# Patient Record
Sex: MALE | Employment: UNEMPLOYED | URBAN - METROPOLITAN AREA
[De-identification: names, ages, dates, MRNs, and addresses within clinical notes are randomized per-mention and may not be internally consistent; named-entity substitution may affect disease eponyms.]

---

## 2022-06-23 LAB
HCV AB SER-ACNC: NEGATIVE
HIV 1+2 AB+HIV1 P24 AG SERPL QL IA: NORMAL

## 2022-06-29 ENCOUNTER — OFFICE VISIT (OUTPATIENT)
Dept: PRIMARY CARE CLINIC | Facility: CLINIC | Age: 62
End: 2022-06-29
Payer: MEDICAID

## 2022-06-29 VITALS
HEIGHT: 63 IN | SYSTOLIC BLOOD PRESSURE: 122 MMHG | DIASTOLIC BLOOD PRESSURE: 72 MMHG | HEART RATE: 90 BPM | WEIGHT: 196.44 LBS | OXYGEN SATURATION: 95 % | BODY MASS INDEX: 34.8 KG/M2

## 2022-06-29 DIAGNOSIS — B35.3 TINEA PEDIS OF BOTH FEET: Primary | ICD-10-CM

## 2022-06-29 DIAGNOSIS — L72.0 EPIDERMAL INCLUSION CYST: ICD-10-CM

## 2022-06-29 DIAGNOSIS — R22.2 MASS OF RIGHT CHEST WALL: ICD-10-CM

## 2022-06-29 DIAGNOSIS — L30.9 DERMATITIS: ICD-10-CM

## 2022-06-29 PROCEDURE — 99214 OFFICE O/P EST MOD 30 MIN: CPT | Mod: S$PBB,,, | Performed by: FAMILY MEDICINE

## 2022-06-29 PROCEDURE — 1159F PR MEDICATION LIST DOCUMENTED IN MEDICAL RECORD: ICD-10-PCS | Mod: CPTII,,, | Performed by: FAMILY MEDICINE

## 2022-06-29 PROCEDURE — 1159F MED LIST DOCD IN RCRD: CPT | Mod: CPTII,,, | Performed by: FAMILY MEDICINE

## 2022-06-29 PROCEDURE — 3008F BODY MASS INDEX DOCD: CPT | Mod: CPTII,,, | Performed by: FAMILY MEDICINE

## 2022-06-29 PROCEDURE — 99999 PR PBB SHADOW E&M-EST. PATIENT-LVL V: CPT | Mod: PBBFAC,,, | Performed by: FAMILY MEDICINE

## 2022-06-29 PROCEDURE — 99215 OFFICE O/P EST HI 40 MIN: CPT | Mod: PBBFAC,PN | Performed by: FAMILY MEDICINE

## 2022-06-29 PROCEDURE — 99999 PR PBB SHADOW E&M-EST. PATIENT-LVL V: ICD-10-PCS | Mod: PBBFAC,,, | Performed by: FAMILY MEDICINE

## 2022-06-29 PROCEDURE — 3078F DIAST BP <80 MM HG: CPT | Mod: CPTII,,, | Performed by: FAMILY MEDICINE

## 2022-06-29 PROCEDURE — 3078F PR MOST RECENT DIASTOLIC BLOOD PRESSURE < 80 MM HG: ICD-10-PCS | Mod: CPTII,,, | Performed by: FAMILY MEDICINE

## 2022-06-29 PROCEDURE — 3008F PR BODY MASS INDEX (BMI) DOCUMENTED: ICD-10-PCS | Mod: CPTII,,, | Performed by: FAMILY MEDICINE

## 2022-06-29 PROCEDURE — 3074F SYST BP LT 130 MM HG: CPT | Mod: CPTII,,, | Performed by: FAMILY MEDICINE

## 2022-06-29 PROCEDURE — 99214 PR OFFICE/OUTPT VISIT, EST, LEVL IV, 30-39 MIN: ICD-10-PCS | Mod: S$PBB,,, | Performed by: FAMILY MEDICINE

## 2022-06-29 PROCEDURE — 3074F PR MOST RECENT SYSTOLIC BLOOD PRESSURE < 130 MM HG: ICD-10-PCS | Mod: CPTII,,, | Performed by: FAMILY MEDICINE

## 2022-06-29 RX ORDER — TERBINAFINE HYDROCHLORIDE 250 MG/1
250 TABLET ORAL DAILY
Qty: 14 TABLET | Refills: 0 | Status: SHIPPED | OUTPATIENT
Start: 2022-06-29 | End: 2022-07-13 | Stop reason: SDUPTHER

## 2022-06-29 RX ORDER — TRIAMCINOLONE ACETONIDE 5 MG/G
CREAM TOPICAL 2 TIMES DAILY
Qty: 15 G | Refills: 0 | Status: SHIPPED | OUTPATIENT
Start: 2022-06-29 | End: 2022-07-07

## 2022-06-29 RX ORDER — CLOTRIMAZOLE AND BETAMETHASONE DIPROPIONATE 10; .64 MG/G; MG/G
CREAM TOPICAL
COMMUNITY
Start: 2022-06-23 | End: 2023-06-19

## 2022-06-29 RX ORDER — PREDNISOLONE ACETATE 10 MG/ML
1 SUSPENSION/ DROPS OPHTHALMIC 3 TIMES DAILY
COMMUNITY
Start: 2022-05-24 | End: 2022-09-01

## 2022-06-29 RX ORDER — LEVOCETIRIZINE DIHYDROCHLORIDE 5 MG/1
5 TABLET, FILM COATED ORAL DAILY
COMMUNITY
Start: 2022-06-23 | End: 2022-09-01 | Stop reason: SINTOL

## 2022-06-29 NOTE — PROGRESS NOTES
Subjective:       Patient ID: Payal Landon is a 61 y.o. male.    Chief Complaint: Establish Care    62 yo M pt, new to me (no PCP), with PMH significant for obesity. He presents with several complaints:     1. He complains of pruritic rash to bilateral feet (left >right) x 2 years. Experiencing rash to interdigital spaces that have not improved with OTC anti-fungals. Also with rash to dorsum of bilateral feet. He was treated at [facility] in [place] on 06/23/2022 with clotrimazole 1%-betamethasone 0.05% cream with some improvement, however, rash still persists.  Has similar rash to flexor surfaces of distal wrists.    2. He c/o mass to left upper back x couple years. No redness, pruritis, drainage, or pain. No change in size.    3. He c/o mass to right posterolateral chest wall x couple years. No redness, pruritis, drainage, or pain. Has been enlarging in size.      Past Medical History:   Diagnosis Date    No known health problems        Patient Active Problem List   Diagnosis    Tinea pedis of both feet    Dermatitis       Past Surgical History:   Procedure Laterality Date    NO PAST SURGERIES         Family History   Problem Relation Age of Onset    Hypertension Father     Diabetes Mellitus Father     Heart disease Father     Cancer Neg Hx        Social History     Tobacco Use   Smoking Status Current Every Day Smoker    Types: Cigarettes   Smokeless Tobacco Never Used       Social History     Social History Narrative    From [place], came to [place]    Tobacco: Initiated at age 20 yo; 0.5 ppd at present     EtOH: None    Drug: None    Employment: ; will retire in 2023    Education: 8th grade     Lives with son, wife            Medications have been reviewed and reconciled.     Review of patient's allergies indicates:  No Known Allergies     Review of Systems   Constitutional: Negative for activity change, appetite change, chills, fever and unexpected weight  "change.   HENT: Negative for congestion and sore throat.    Eyes: Negative for redness, itching and visual disturbance.   Respiratory: Negative for cough, shortness of breath and wheezing.    Cardiovascular: Negative for chest pain.   Gastrointestinal: Negative for abdominal pain, constipation, diarrhea, nausea and vomiting.   Genitourinary: Negative for difficulty urinating, dysuria, frequency, penile discharge and urgency.   Musculoskeletal: Negative for back pain.   Skin: Positive for rash.   Neurological: Negative for dizziness, syncope, weakness and headaches.   Psychiatric/Behavioral: Negative for dysphoric mood and suicidal ideas. The patient is not nervous/anxious.          Objective:        /72 (BP Location: Left arm, Patient Position: Sitting, BP Method: Medium (Automatic))   Pulse 90   Ht 5' 3" (1.6 m)   Wt 89.1 kg (196 lb 6.9 oz)   SpO2 95%   BMI 34.80 kg/m²      Physical Exam  Constitutional:       General: He is not in acute distress.     Appearance: He is well-developed. He is obese.   HENT:      Head: Normocephalic and atraumatic.      Right Ear: External ear normal.      Left Ear: External ear normal.   Eyes:      General: No scleral icterus.     Extraocular Movements: Extraocular movements intact.      Conjunctiva/sclera: Conjunctivae normal.   Cardiovascular:      Rate and Rhythm: Normal rate and regular rhythm.      Heart sounds: Normal heart sounds. No murmur heard.    No friction rub. No gallop.   Pulmonary:      Effort: Pulmonary effort is normal. No respiratory distress.      Breath sounds: Normal breath sounds. No wheezing or rales.   Musculoskeletal:         General: No tenderness or deformity. Normal range of motion.      Cervical back: Normal range of motion.   Skin:     General: Skin is warm and dry.      Findings: No erythema or rash.      Comments: + scaling and maceration to 3rd and 4th interdigital spaces of bilateral feet.     Right lateral malleolus with patchy region " hyperpigmentation, lichenification, and overlying scale     Dorsolateral aspect of left foot with patchy regions of hyperpigmentation, lichenification, and overlying scale    Left superoposterior chest wall: Fingertip-sized subcutaneous rubbery mass with black punctum. Sebaceous material able to be expressed.     Right posterolateral chest wall: 4x3 cm mobile mass with dough-like consistency.   Neurological:      Mental Status: He is alert and oriented to person, place, and time.      Cranial Nerves: No cranial nerve deficit.      Motor: No abnormal muscle tone.      Gait: Gait normal.   Psychiatric:         Behavior: Behavior normal.           Assessment:       1. Tinea pedis of both feet    2. Dermatitis    3. Epidermal inclusion cyst    4. Mass of right chest wall        Plan:       Payal was seen today for establish care.    Diagnoses and all orders for this visit:    Tinea pedis of both feet  -     terbinafine HCL (LAMISIL) 250 mg tablet; Take 1 tablet (250 mg total) by mouth once daily. for 14 days    Dermatitis  -     triamcinolone acetonide 0.5% (KENALOG) 0.5 % Crea; Apply topically 2 (two) times daily. for 14 days    Epidermal inclusion cyst  -     Ambulatory referral/consult to Dermatology; Future    Mass of right chest wall  -     US Soft Tissue Chest_Upper Back; Future  -     Ambulatory referral/consult to General Surgery; Future    Bilateral tinea pedis   --No improvement with OTC anti-fungals   --No improvement with 1 week of rx'd clotrimazole 1%-betamethasone 0.05% given by [facility].  --Will tx with terbinafine 250 mg daily x 2 weeks    Dermatitis; bilateral feet  --Present x 2 years  --Suspect Atopic Dermatitis based on appearance; psoriasis also a possibility given location at extensor surfaces.  --Tx with triamcinolone 0.5% BID x 2 weeks   --Gentle skin regimen to be reviewed on follow-up    EIC; Left superoposterior chest wall  --Reassured benign etiology   --Refer to dermatology for  excision     Suspect lipoma right posterolateral chest wall  --Reassured benign etiology   --Refer to US for further characterization   --Refer to gen surg for removal        Requesting lab/vaccine records from    Had PSA there done as well    Follow up in about 2 weeks (around 7/13/2022) for f/u visit for dermatitis of feet..    I spent a total of 54 minutes on the day of the visit.This includes face to face time and non-face to face time preparing to see the patient (eg, review of tests), obtaining and/or reviewing separately obtained history, documenting clinical information in the electronic or other health record, independently interpreting results and communicating results to the patient/family/caregiver, or care coordinator.

## 2022-06-30 ENCOUNTER — OFFICE VISIT (OUTPATIENT)
Dept: SURGERY | Facility: CLINIC | Age: 62
End: 2022-06-30
Payer: MEDICAID

## 2022-06-30 ENCOUNTER — PATIENT MESSAGE (OUTPATIENT)
Dept: ADMINISTRATIVE | Facility: HOSPITAL | Age: 62
End: 2022-06-30
Payer: MEDICAID

## 2022-06-30 VITALS
SYSTOLIC BLOOD PRESSURE: 125 MMHG | WEIGHT: 194 LBS | HEIGHT: 63 IN | HEART RATE: 101 BPM | DIASTOLIC BLOOD PRESSURE: 85 MMHG | BODY MASS INDEX: 34.38 KG/M2

## 2022-06-30 DIAGNOSIS — R22.2 MASS OF RIGHT CHEST WALL: Primary | ICD-10-CM

## 2022-06-30 PROCEDURE — 3079F PR MOST RECENT DIASTOLIC BLOOD PRESSURE 80-89 MM HG: ICD-10-PCS | Mod: CPTII,,, | Performed by: SURGERY

## 2022-06-30 PROCEDURE — 99203 OFFICE O/P NEW LOW 30 MIN: CPT | Mod: S$PBB,,, | Performed by: SURGERY

## 2022-06-30 PROCEDURE — 1159F PR MEDICATION LIST DOCUMENTED IN MEDICAL RECORD: ICD-10-PCS | Mod: CPTII,,, | Performed by: SURGERY

## 2022-06-30 PROCEDURE — 99999 PR PBB SHADOW E&M-EST. PATIENT-LVL V: ICD-10-PCS | Mod: PBBFAC,,, | Performed by: SURGERY

## 2022-06-30 PROCEDURE — 3074F SYST BP LT 130 MM HG: CPT | Mod: CPTII,,, | Performed by: SURGERY

## 2022-06-30 PROCEDURE — 99999 PR PBB SHADOW E&M-EST. PATIENT-LVL V: CPT | Mod: PBBFAC,,, | Performed by: SURGERY

## 2022-06-30 PROCEDURE — 1160F RVW MEDS BY RX/DR IN RCRD: CPT | Mod: CPTII,,, | Performed by: SURGERY

## 2022-06-30 PROCEDURE — 1159F MED LIST DOCD IN RCRD: CPT | Mod: CPTII,,, | Performed by: SURGERY

## 2022-06-30 PROCEDURE — 3008F PR BODY MASS INDEX (BMI) DOCUMENTED: ICD-10-PCS | Mod: CPTII,,, | Performed by: SURGERY

## 2022-06-30 PROCEDURE — 3079F DIAST BP 80-89 MM HG: CPT | Mod: CPTII,,, | Performed by: SURGERY

## 2022-06-30 PROCEDURE — 3008F BODY MASS INDEX DOCD: CPT | Mod: CPTII,,, | Performed by: SURGERY

## 2022-06-30 PROCEDURE — 99203 PR OFFICE/OUTPT VISIT, NEW, LEVL III, 30-44 MIN: ICD-10-PCS | Mod: S$PBB,,, | Performed by: SURGERY

## 2022-06-30 PROCEDURE — 1160F PR REVIEW ALL MEDS BY PRESCRIBER/CLIN PHARMACIST DOCUMENTED: ICD-10-PCS | Mod: CPTII,,, | Performed by: SURGERY

## 2022-06-30 PROCEDURE — 99215 OFFICE O/P EST HI 40 MIN: CPT | Mod: PBBFAC,PN | Performed by: SURGERY

## 2022-06-30 PROCEDURE — 3074F PR MOST RECENT SYSTOLIC BLOOD PRESSURE < 130 MM HG: ICD-10-PCS | Mod: CPTII,,, | Performed by: SURGERY

## 2022-07-01 ENCOUNTER — PATIENT OUTREACH (OUTPATIENT)
Dept: PRIMARY CARE CLINIC | Facility: CLINIC | Age: 62
End: 2022-07-01
Payer: MEDICAID

## 2022-07-01 NOTE — PROGRESS NOTES
History & Physical    SUBJECTIVE:     History of Present Illness:  Patient is a 61 y.o. male presents with   Multiple soft tissue masses that he would like removed  States that the largest is on his right flank area approximately 5 cm straight  Also has 3 cystic masses on his upper back.  Discussed with him doing surgical excision in the OR for all these.  He agrees to this plan.    Chief Complaint   Patient presents with    Cyst       Review of patient's allergies indicates:  No Known Allergies    Current Outpatient Medications   Medication Sig Dispense Refill    clotrimazole-betamethasone 1-0.05% (LOTRISONE) cream Apply topically.      levocetirizine (XYZAL) 5 MG tablet Take 5 mg by mouth once daily.      prednisoLONE acetate (PRED FORTE) 1 % DrpS Place 1 drop into both eyes 3 (three) times daily.      terbinafine HCL (LAMISIL) 250 mg tablet Take 1 tablet (250 mg total) by mouth once daily. for 14 days 14 tablet 0    triamcinolone acetonide 0.5% (KENALOG) 0.5 % Crea Apply topically 2 (two) times daily. for 14 days 15 g 0     No current facility-administered medications for this visit.       Past Medical History:   Diagnosis Date    No known health problems      Past Surgical History:   Procedure Laterality Date    NO PAST SURGERIES       Family History   Problem Relation Age of Onset    Hypertension Father     Diabetes Mellitus Father     Heart disease Father     Cancer Neg Hx      Social History     Tobacco Use    Smoking status: Current Every Day Smoker     Packs/day: 0.50     Types: Cigarettes    Smokeless tobacco: Never Used   Substance Use Topics    Alcohol use: Never    Drug use: Never        Review of Systems:  Review of Systems   Constitutional: Negative for appetite change, fatigue, fever and unexpected weight change.   HENT: Negative for sore throat and trouble swallowing.    Eyes: Negative.    Respiratory: Negative for cough, shortness of breath and wheezing.    Cardiovascular: Negative  "for chest pain and leg swelling.   Gastrointestinal: Negative for abdominal distention, abdominal pain, blood in stool, constipation, diarrhea, nausea and vomiting.   Endocrine: Negative.    Genitourinary: Negative.    Musculoskeletal: Negative for back pain.   Skin: Negative.  Negative for rash.   Allergic/Immunologic: Negative.    Neurological: Negative.    Hematological: Negative.    Psychiatric/Behavioral: Negative for confusion.       OBJECTIVE:     Vital Signs (Most Recent)  Pulse: 101 (06/30/22 1551)  BP: 125/85 (06/30/22 1551)  5' 3" (1.6 m)  88 kg (194 lb 0.1 oz)     Physical Exam:  Physical Exam  Vitals and nursing note reviewed.   Constitutional:       Appearance: He is well-developed.   HENT:      Head: Normocephalic and atraumatic.   Cardiovascular:      Rate and Rhythm: Normal rate.      Heart sounds: Normal heart sounds.   Pulmonary:      Effort: Pulmonary effort is normal.   Abdominal:      General: Bowel sounds are normal. There is no distension.      Palpations: Abdomen is soft.      Tenderness: There is no abdominal tenderness.   Musculoskeletal:         General: Normal range of motion.      Cervical back: Normal range of motion.   Skin:     General: Skin is warm and dry.      Capillary Refill: Capillary refill takes less than 2 seconds.          Neurological:      Mental Status: He is alert and oriented to person, place, and time.   Psychiatric:         Behavior: Behavior normal.         Laboratory  CBC: Reviewed  CMP: Reviewed  wnl    ASSESSMENT/PLAN:     61-year-old male with multiple soft tissue masses  Recommend surgical excision OR.  Patient agrees to this plan.  All risks benefits discussed.           "

## 2022-07-08 ENCOUNTER — PATIENT MESSAGE (OUTPATIENT)
Dept: SURGERY | Facility: CLINIC | Age: 62
End: 2022-07-08
Payer: MEDICAID

## 2022-07-13 ENCOUNTER — OFFICE VISIT (OUTPATIENT)
Dept: PRIMARY CARE CLINIC | Facility: CLINIC | Age: 62
End: 2022-07-13
Payer: MEDICAID

## 2022-07-13 DIAGNOSIS — B35.3 TINEA PEDIS OF BOTH FEET: ICD-10-CM

## 2022-07-13 DIAGNOSIS — L30.9 DERMATITIS: Primary | ICD-10-CM

## 2022-07-13 DIAGNOSIS — T14.8XXA SKIN WOUND FROM SURGICAL INCISION: ICD-10-CM

## 2022-07-13 PROCEDURE — 99999 PR PBB SHADOW E&M-EST. PATIENT-LVL IV: CPT | Mod: PBBFAC,,, | Performed by: FAMILY MEDICINE

## 2022-07-13 PROCEDURE — 3078F DIAST BP <80 MM HG: CPT | Mod: CPTII,,, | Performed by: FAMILY MEDICINE

## 2022-07-13 PROCEDURE — 99214 OFFICE O/P EST MOD 30 MIN: CPT | Mod: S$PBB,,, | Performed by: FAMILY MEDICINE

## 2022-07-13 PROCEDURE — 3074F SYST BP LT 130 MM HG: CPT | Mod: CPTII,,, | Performed by: FAMILY MEDICINE

## 2022-07-13 PROCEDURE — 99214 OFFICE O/P EST MOD 30 MIN: CPT | Mod: PBBFAC,PN | Performed by: FAMILY MEDICINE

## 2022-07-13 PROCEDURE — 3074F PR MOST RECENT SYSTOLIC BLOOD PRESSURE < 130 MM HG: ICD-10-PCS | Mod: CPTII,,, | Performed by: FAMILY MEDICINE

## 2022-07-13 PROCEDURE — 3078F PR MOST RECENT DIASTOLIC BLOOD PRESSURE < 80 MM HG: ICD-10-PCS | Mod: CPTII,,, | Performed by: FAMILY MEDICINE

## 2022-07-13 PROCEDURE — 3008F PR BODY MASS INDEX (BMI) DOCUMENTED: ICD-10-PCS | Mod: CPTII,,, | Performed by: FAMILY MEDICINE

## 2022-07-13 PROCEDURE — 99999 PR PBB SHADOW E&M-EST. PATIENT-LVL IV: ICD-10-PCS | Mod: PBBFAC,,, | Performed by: FAMILY MEDICINE

## 2022-07-13 PROCEDURE — 99214 PR OFFICE/OUTPT VISIT, EST, LEVL IV, 30-39 MIN: ICD-10-PCS | Mod: S$PBB,,, | Performed by: FAMILY MEDICINE

## 2022-07-13 PROCEDURE — 3008F BODY MASS INDEX DOCD: CPT | Mod: CPTII,,, | Performed by: FAMILY MEDICINE

## 2022-07-13 RX ORDER — KETOCONAZOLE 20 MG/G
CREAM TOPICAL DAILY
Qty: 60 G | Refills: 0 | Status: SHIPPED | OUTPATIENT
Start: 2022-07-13 | End: 2023-06-19

## 2022-07-13 RX ORDER — TERBINAFINE HYDROCHLORIDE 250 MG/1
250 TABLET ORAL DAILY
Qty: 30 TABLET | Refills: 0 | Status: SHIPPED | OUTPATIENT
Start: 2022-07-13 | End: 2022-07-27

## 2022-07-13 RX ORDER — FLUOCINONIDE 0.5 MG/G
OINTMENT TOPICAL 2 TIMES DAILY
Qty: 60 G | Refills: 0 | Status: SHIPPED | OUTPATIENT
Start: 2022-07-13 | End: 2023-06-19

## 2022-07-13 NOTE — PATIENT INSTRUCTIONS
Please call [facility] to schedule an appointment    [place] [id number] - [place] [address] - [phone]  ([facility])  Monday - Friday 9:00-5:00, Every Other Saturday Appointments Available

## 2022-07-13 NOTE — PROGRESS NOTES
Subjective:       Patient ID: Donnie Landon is a 61 y.o. male.    Chief Complaint: Follow-up Clinical Reassessment (Tinea pedis )    60 yo M, recently established with me (last visit 06/29/2022) with PMH significant for obesity. He presents to f/u rash to bilateral feet: Details from previous visit reviewed  ===========  1. He complains of pruritic rash to bilateral feet (left >right) x 2 years. Experiencing rash to interdigital spaces that have not improved with OTC anti-fungals. Also with rash to dorsum of bilateral feet. He was treated at [facility] in [place] on 06/23/2022 with clotrimazole 1%-betamethasone 0.05% cream with some improvement, however, rash still persists.  Has similar rash to flexor surfaces of distal wrists.    2. He c/o mass to left upper back x couple years. No redness, pruritis, drainage, or pain. No change in size.    3. He c/o mass to right posterolateral chest wall x couple years. No redness, pruritis, drainage, or pain. Has been enlarging in size.  ==========  He underwent soft tissue biopsy x 5 7/6/2022. Pathology showed lipoma x 1 and epidermal inclusion cyst x 4.  Rash to dorsum of feet has improved with triamcinolone 0.5% BID, however, lesions remain present.   Rash in between toes has not improved with terbinafine 250 mg daily x 2 weeks. He notes increased itching.        Past Medical History:   Diagnosis Date    No known health problems        Patient Active Problem List   Diagnosis    Tinea pedis of both feet    Dermatitis       Past Surgical History:   Procedure Laterality Date    NO PAST SURGERIES      SOFT TISSUE BIOPSY  07/06/2022    SOFT TISSUE BIOPSY N/A 7/6/2022    Procedure: BIOPSY, SOFT TISSUE, left  back, right back, mid back R flank 5cm;  Surgeon: Jb Bell MD;  Location: [facility];  Service: General;  Laterality: N/A;       Family History   Problem Relation Age of Onset    Hypertension Father     Diabetes Mellitus Father     Heart  "disease Father     Cancer Neg Hx        Social History     Tobacco Use   Smoking Status Current Every Day Smoker    Packs/day: 0.50    Types: Cigarettes   Smokeless Tobacco Never Used       Social History     Social History Narrative    From [place], came [place]    Tobacco: Initiated at age 22 yo; 0.5 ppd at present     EtOH: None    Drug: None    Employment: ; will retire in 2023    Education: 8th grade     Lives with son, wife            Medications have been reviewed and reconciled.     Review of patient's allergies indicates:  No Known Allergies     Review of Systems   Constitutional: Negative for activity change, appetite change, chills, fever and unexpected weight change.   HENT: Negative for congestion and sore throat.    Eyes: Negative for redness, itching and visual disturbance.   Respiratory: Negative for cough, shortness of breath and wheezing.    Cardiovascular: Negative for chest pain.   Gastrointestinal: Negative for abdominal pain, constipation, diarrhea, nausea and vomiting.   Genitourinary: Negative for difficulty urinating, dysuria, frequency, penile discharge and urgency.   Musculoskeletal: Negative for back pain.   Skin: Positive for rash and wound (multiple localized to back).   Neurological: Negative for dizziness, syncope, weakness and headaches.   Psychiatric/Behavioral: Negative for dysphoric mood and suicidal ideas. The patient is not nervous/anxious.          Objective:        /60   Pulse 86   Ht 5' 3" (1.6 m)   Wt 89 kg (196 lb 3.4 oz)   SpO2 97%   BMI 34.76 kg/m²      Physical Exam  Constitutional:       General: He is not in acute distress.     Appearance: He is well-developed. He is obese.   HENT:      Head: Normocephalic and atraumatic.      Right Ear: External ear normal.      Left Ear: External ear normal.   Eyes:      General: No scleral icterus.     Extraocular Movements: Extraocular movements intact.      Conjunctiva/sclera: " Conjunctivae normal.   Cardiovascular:      Rate and Rhythm: Normal rate and regular rhythm.      Heart sounds: Normal heart sounds. No murmur heard.    No friction rub. No gallop.   Pulmonary:      Effort: Pulmonary effort is normal. No respiratory distress.      Breath sounds: Normal breath sounds. No wheezing or rales.   Musculoskeletal:         General: No tenderness or deformity. Normal range of motion.      Cervical back: Normal range of motion.   Skin:     General: Skin is warm and dry.      Findings: No erythema or rash.      Comments: + maceration to right 3rd interdigital space. + maceration and granulation tissue to left 3rd and 4th interdigital spaces.     Right lateral malleolus with patchy region hyperpigmentation, lichenification, and overlying scale     Dorsolateral aspect of left foot with patchy regions of hyperpigmentation, lichenification, and overlying scale    Posterior chest wall: Three open incisions; no drainage.     Right posterolateral chest wall: incision with sutures in place   Neurological:      Mental Status: He is alert and oriented to person, place, and time.      Cranial Nerves: No cranial nerve deficit.      Motor: No abnormal muscle tone.      Gait: Gait normal.   Psychiatric:         Behavior: Behavior normal.                   Assessment:       1. Dermatitis    2. Tinea pedis of both feet    3. Skin wound from surgical incision        Plan:       Donnie was seen today for follow-up clinical reassessment.    Diagnoses and all orders for this visit:    Dermatitis  -     Ambulatory referral/consult to Dermatology; Future  -     fluocinonide (LIDEX) 0.05 % ointment; Apply topically 2 (two) times daily. On top of feet for 14 days    Tinea pedis of both feet  -     Ambulatory referral/consult to Dermatology; Future  -     terbinafine HCL (LAMISIL) 250 mg tablet; Take 1 tablet (250 mg total) by mouth once daily. for 14 days  -     ketoconazole (NIZORAL) 2 % cream; Apply topically  once daily. Between the toes.    Skin wound from surgical incision    Dermatitis; bilateral feet  --Present x 2 years  --Suspect Atopic Dermatitis based on appearance; psoriasis also a possibility given location at extensor surfaces.  --Some improvement with triamcinolone 0.5% BID x 2 weeks   --Will give trial of fluocinonide 0.05% oint BID x 2 weeks and refer to derm  --Gentle skin regimen reviewed     Bilateral tinea pedis   --No subjective improvement with terbinafine 250 mg daily x 2 weeks  --Extend course for another 2-4 weeks.   --Add topical ketoconazole x 2-4 weeks  --If no improvement over next 2 weeks, consider itraconazole.  --Refer to dermatology    Surgical wound; posterior chest wall  S/p soft tissue excisional biopsy x 5   Recommend f/u with surgery for any concerns                             HM   PSA 0.2 6/2022  Tdap 6/23/2022  Shingrix #1 done 6/29/2022. Next due 08/29/2022  COVID-19: Had [employer] x 2 dose   Discuss CRC screen on next visit    F/U prn    I spent a total of 30 minutes on the day of the visit.This includes face to face time and non-face to face time preparing to see the patient (eg, review of tests), obtaining and/or reviewing separately obtained history, documenting clinical information in the electronic or other health record, independently interpreting results and communicating results to the patient/family/caregiver, or care coordinator.    No follow-ups on file.

## 2022-07-14 ENCOUNTER — OFFICE VISIT (OUTPATIENT)
Dept: SURGERY | Facility: CLINIC | Age: 62
End: 2022-07-14
Payer: MEDICAID

## 2022-07-14 VITALS
DIASTOLIC BLOOD PRESSURE: 79 MMHG | SYSTOLIC BLOOD PRESSURE: 116 MMHG | BODY MASS INDEX: 34.42 KG/M2 | HEART RATE: 84 BPM | HEIGHT: 63 IN | WEIGHT: 194.25 LBS

## 2022-07-14 DIAGNOSIS — S31.000A MULTIPLE OPEN WOUNDS OF LOWER BACK: Primary | ICD-10-CM

## 2022-07-14 DIAGNOSIS — Z98.890 POST-OPERATIVE STATE: ICD-10-CM

## 2022-07-14 PROCEDURE — 99999 PR PBB SHADOW E&M-EST. PATIENT-LVL IV: ICD-10-PCS | Mod: PBBFAC,,, | Performed by: SURGERY

## 2022-07-14 PROCEDURE — 1159F MED LIST DOCD IN RCRD: CPT | Mod: CPTII,,, | Performed by: SURGERY

## 2022-07-14 PROCEDURE — 99214 OFFICE O/P EST MOD 30 MIN: CPT | Mod: PBBFAC,PN | Performed by: SURGERY

## 2022-07-14 PROCEDURE — 3008F PR BODY MASS INDEX (BMI) DOCUMENTED: ICD-10-PCS | Mod: CPTII,,, | Performed by: SURGERY

## 2022-07-14 PROCEDURE — 3078F PR MOST RECENT DIASTOLIC BLOOD PRESSURE < 80 MM HG: ICD-10-PCS | Mod: CPTII,,, | Performed by: SURGERY

## 2022-07-14 PROCEDURE — 1159F PR MEDICATION LIST DOCUMENTED IN MEDICAL RECORD: ICD-10-PCS | Mod: CPTII,,, | Performed by: SURGERY

## 2022-07-14 PROCEDURE — 99999 PR PBB SHADOW E&M-EST. PATIENT-LVL IV: CPT | Mod: PBBFAC,,, | Performed by: SURGERY

## 2022-07-14 PROCEDURE — 99024 PR POST-OP FOLLOW-UP VISIT: ICD-10-PCS | Mod: ,,, | Performed by: SURGERY

## 2022-07-14 PROCEDURE — 99024 POSTOP FOLLOW-UP VISIT: CPT | Mod: ,,, | Performed by: SURGERY

## 2022-07-14 PROCEDURE — 3074F SYST BP LT 130 MM HG: CPT | Mod: CPTII,,, | Performed by: SURGERY

## 2022-07-14 PROCEDURE — 3074F PR MOST RECENT SYSTOLIC BLOOD PRESSURE < 130 MM HG: ICD-10-PCS | Mod: CPTII,,, | Performed by: SURGERY

## 2022-07-14 PROCEDURE — 3078F DIAST BP <80 MM HG: CPT | Mod: CPTII,,, | Performed by: SURGERY

## 2022-07-14 PROCEDURE — 3008F BODY MASS INDEX DOCD: CPT | Mod: CPTII,,, | Performed by: SURGERY

## 2022-07-14 PROCEDURE — 1160F RVW MEDS BY RX/DR IN RCRD: CPT | Mod: CPTII,,, | Performed by: SURGERY

## 2022-07-14 PROCEDURE — 1160F PR REVIEW ALL MEDS BY PRESCRIBER/CLIN PHARMACIST DOCUMENTED: ICD-10-PCS | Mod: CPTII,,, | Performed by: SURGERY

## 2022-07-14 RX ORDER — ATORVASTATIN CALCIUM 20 MG/1
TABLET, FILM COATED ORAL
COMMUNITY
End: 2023-11-14

## 2022-07-17 VITALS
OXYGEN SATURATION: 97 % | DIASTOLIC BLOOD PRESSURE: 60 MMHG | SYSTOLIC BLOOD PRESSURE: 119 MMHG | HEART RATE: 86 BPM | BODY MASS INDEX: 34.76 KG/M2 | WEIGHT: 196.19 LBS | HEIGHT: 63 IN

## 2022-07-19 NOTE — PROGRESS NOTES
"Donnie Landon is a 61 y.o. male patient.   1. Multiple open wounds of lower back    Two weeks status post multiple back lesions  If you in the upper back incision sites have opened up due to superficial wound dehiscence.  These are in the upper arch his back and likely just opened up with loss of movement leg on.  No sign of infection.  Recommend the patient see wound care twice a week for now.  No need for surgical debridement.  Patient agrees to this plan.    Past Medical History:   Diagnosis Date    No known health problems      No past surgical history pertinent negatives on file.  Scheduled Meds:  Continuous Infusions:  PRN Meds:    Review of patient's allergies indicates:  No Known Allergies  There are no hospital problems to display for this patient.    Blood pressure 116/79, pulse 84, height 5' 3" (1.6 m), weight 88.1 kg (194 lb 3.6 oz).    Subjective:   Diet: Adequate intake.  Patient reports no nausea.    Activity level: Returning to normal.    Pain control: Well controlled.      Objective:  Vital signs (most recent): Blood pressure 116/79, pulse 84, height 5' 3" (1.6 m), weight 88.1 kg (194 lb 3.6 oz).  General appearance: Comfortable.    Lungs:  Normal effort.    Heart: Normal rate.    Abdomen: Abdomen is soft.    Bowel sounds:  Bowel sounds are normal.    Tenderness: There is no abdominal tenderness tenderness.    Pathology:  Multiple epidermal inclusion cyst and several lipomas.     Assessment:   Condition: In stable condition.       return to clinic in 3-4 weeks       Jb Bell MD  7/19/2022  "

## 2022-07-20 ENCOUNTER — TELEPHONE (OUTPATIENT)
Dept: PRIMARY CARE CLINIC | Facility: CLINIC | Age: 62
End: 2022-07-20
Payer: MEDICAID

## 2022-07-20 RX ORDER — OXYCODONE AND ACETAMINOPHEN 7.5; 325 MG/1; MG/1
1 TABLET ORAL EVERY 6 HOURS PRN
Qty: 15 TABLET | Refills: 0 | Status: SHIPPED | OUTPATIENT
Start: 2022-07-20 | End: 2022-09-01

## 2022-07-20 NOTE — TELEPHONE ENCOUNTER
----- Message from Ana Maria Ernandez sent at 7/20/2022 10:12 AM CDT -----  Contact: [facility] [phone]  Pharmacy is calling to clarify an RX.  RX name:  fluocinonide (LIDEX) 0.05 % ointment  What do they need to clarify:  needs PA  Comments:     Please call and advise.    Thank You

## 2022-09-01 ENCOUNTER — OFFICE VISIT (OUTPATIENT)
Dept: PRIMARY CARE CLINIC | Facility: CLINIC | Age: 62
End: 2022-09-01
Payer: MEDICAID

## 2022-09-01 VITALS
WEIGHT: 195.13 LBS | HEIGHT: 63 IN | SYSTOLIC BLOOD PRESSURE: 110 MMHG | BODY MASS INDEX: 34.57 KG/M2 | TEMPERATURE: 98 F | HEART RATE: 86 BPM | DIASTOLIC BLOOD PRESSURE: 66 MMHG

## 2022-09-01 DIAGNOSIS — Z72.0 TOBACCO USE: ICD-10-CM

## 2022-09-01 DIAGNOSIS — B35.3 TINEA PEDIS OF BOTH FEET: ICD-10-CM

## 2022-09-01 DIAGNOSIS — L30.9 DERMATITIS: ICD-10-CM

## 2022-09-01 DIAGNOSIS — R06.83 SNORING: Primary | ICD-10-CM

## 2022-09-01 DIAGNOSIS — J30.89 ALLERGIC RHINITIS DUE TO OTHER ALLERGIC TRIGGER, UNSPECIFIED SEASONALITY: ICD-10-CM

## 2022-09-01 PROCEDURE — 99214 OFFICE O/P EST MOD 30 MIN: CPT | Mod: S$PBB,,, | Performed by: FAMILY MEDICINE

## 2022-09-01 PROCEDURE — 3074F PR MOST RECENT SYSTOLIC BLOOD PRESSURE < 130 MM HG: ICD-10-PCS | Mod: CPTII,,, | Performed by: FAMILY MEDICINE

## 2022-09-01 PROCEDURE — 3078F DIAST BP <80 MM HG: CPT | Mod: CPTII,,, | Performed by: FAMILY MEDICINE

## 2022-09-01 PROCEDURE — 1159F PR MEDICATION LIST DOCUMENTED IN MEDICAL RECORD: ICD-10-PCS | Mod: CPTII,,, | Performed by: FAMILY MEDICINE

## 2022-09-01 PROCEDURE — 3008F PR BODY MASS INDEX (BMI) DOCUMENTED: ICD-10-PCS | Mod: CPTII,,, | Performed by: FAMILY MEDICINE

## 2022-09-01 PROCEDURE — 99999 PR PBB SHADOW E&M-EST. PATIENT-LVL IV: ICD-10-PCS | Mod: PBBFAC,,, | Performed by: FAMILY MEDICINE

## 2022-09-01 PROCEDURE — 3074F SYST BP LT 130 MM HG: CPT | Mod: CPTII,,, | Performed by: FAMILY MEDICINE

## 2022-09-01 PROCEDURE — 90677 PCV20 VACCINE IM: CPT | Mod: PBBFAC,PN

## 2022-09-01 PROCEDURE — 3008F BODY MASS INDEX DOCD: CPT | Mod: CPTII,,, | Performed by: FAMILY MEDICINE

## 2022-09-01 PROCEDURE — 99214 PR OFFICE/OUTPT VISIT, EST, LEVL IV, 30-39 MIN: ICD-10-PCS | Mod: S$PBB,,, | Performed by: FAMILY MEDICINE

## 2022-09-01 PROCEDURE — 99999 PR PBB SHADOW E&M-EST. PATIENT-LVL IV: CPT | Mod: PBBFAC,,, | Performed by: FAMILY MEDICINE

## 2022-09-01 PROCEDURE — 3078F PR MOST RECENT DIASTOLIC BLOOD PRESSURE < 80 MM HG: ICD-10-PCS | Mod: CPTII,,, | Performed by: FAMILY MEDICINE

## 2022-09-01 PROCEDURE — 1159F MED LIST DOCD IN RCRD: CPT | Mod: CPTII,,, | Performed by: FAMILY MEDICINE

## 2022-09-01 PROCEDURE — 99214 OFFICE O/P EST MOD 30 MIN: CPT | Mod: PBBFAC,PN | Performed by: FAMILY MEDICINE

## 2022-09-01 RX ORDER — NYSTATIN 100000 [USP'U]/G
POWDER TOPICAL
COMMUNITY
Start: 2022-07-15 | End: 2023-06-19

## 2022-09-01 RX ORDER — MUPIROCIN 20 MG/G
OINTMENT TOPICAL
COMMUNITY
Start: 2022-08-10 | End: 2023-06-19

## 2022-09-01 RX ORDER — NYSTATIN 100000 [USP'U]/G
POWDER TOPICAL
COMMUNITY
End: 2023-06-19

## 2022-09-01 RX ORDER — TRIAMCINOLONE ACETONIDE 5 MG/G
CREAM TOPICAL
Qty: 15 G | Refills: 0 | Status: SHIPPED | OUTPATIENT
Start: 2022-09-01 | End: 2023-06-19

## 2022-09-01 RX ORDER — DOXYCYCLINE 100 MG/1
CAPSULE ORAL
COMMUNITY
End: 2023-06-19

## 2022-09-01 RX ORDER — POLYETHYLENE GLYCOL 3350 17 G/17G
POWDER, FOR SOLUTION ORAL
COMMUNITY
Start: 2022-07-20 | End: 2023-06-19

## 2022-09-01 RX ORDER — CETIRIZINE HYDROCHLORIDE 10 MG/1
10 TABLET ORAL DAILY
Qty: 90 TABLET | Refills: 0 | Status: SHIPPED | OUTPATIENT
Start: 2022-09-01 | End: 2023-06-19

## 2022-09-01 RX ORDER — FLUTICASONE PROPIONATE 50 MCG
2 SPRAY, SUSPENSION (ML) NASAL DAILY
Qty: 16 G | Refills: 2 | Status: SHIPPED | OUTPATIENT
Start: 2022-09-01 | End: 2023-06-19

## 2022-09-01 NOTE — PROGRESS NOTES
Subjective:       Patient ID: Donnie Landon is a 61 y.o. male.    Chief Complaint: Snoring (/) and Fatigue    60 yo M, recently established with me (last visit 07/13/2022) with PMH significant for obesity. He presents with c/o abnormal snoring x > 1 year. Reports that he snores loud enough to be heard throughout the house. Reports excessive sleeping (approximately 8 hrs) and wakes feeling tired. Often taking naps or not realizing that he's sleeping. Not falling asleep while driving. No known apneic episodes. No h/o HTN. Also feeling nasal congestion/nasal obstruction. No cough. Occasional sore throat. + post-nasal drip. Feels that he has some swelling to anterior neck. Takes xyzal for allergies, but this causes sedation. Has been evaluated by [facility] dermatology for rash to feet. Details from previous note regarding this subject matter reviewed.      Past Medical History:   Diagnosis Date    No known health problems        Patient Active Problem List   Diagnosis    Tinea pedis of both feet    Dermatitis       Past Surgical History:   Procedure Laterality Date    COLONOSCOPY  07/29/2022    COLONOSCOPY N/A 7/29/2022    Procedure: COLONOSCOPY;  Surgeon: Cj Reddy MD;  Location: [facility] ENDO;  Service: Endoscopy;  Laterality: N/A;    NO PAST SURGERIES      SOFT TISSUE BIOPSY  07/06/2022    SOFT TISSUE BIOPSY N/A 07/06/2022    Procedure: BIOPSY, SOFT TISSUE, left  back, right back, mid back R flank 5cm;  Surgeon: Jb Bell MD;  Location: [facility] OR;  Service: General;  Laterality: N/A;       Family History   Problem Relation Age of Onset    Hypertension Father     Diabetes Mellitus Father     Heart disease Father     Cancer Neg Hx        Social History     Tobacco Use   Smoking Status Every Day    Packs/day: 0.50    Types: Cigarettes   Smokeless Tobacco Never       Social History     Social History Narrative    From [place], came to [place] ago    Tobacco: Initiated at age 22 yo; 0.5 ppd at present   "   EtOH: None    Drug: None    Employment: ; will retire in 2023    Education: 8th grade     Lives with son, wife            Medications have been reviewed and reconciled.     Review of patient's allergies indicates:  No Known Allergies     Review of Systems   Constitutional:  Positive for fatigue. Negative for activity change, appetite change, chills, fever and unexpected weight change.   HENT:  Positive for congestion, postnasal drip and sore throat (intermittent.).    Eyes:  Negative for redness, itching and visual disturbance.   Respiratory:  Negative for cough, shortness of breath and wheezing.    Cardiovascular:  Negative for chest pain.   Gastrointestinal:  Negative for abdominal pain, constipation, diarrhea, nausea and vomiting.   Genitourinary:  Negative for difficulty urinating, dysuria, frequency, penile discharge and urgency.   Musculoskeletal:  Negative for back pain.   Skin:  Positive for rash (feet.).   Neurological:  Negative for dizziness, syncope, weakness and headaches.   Hematological:  Positive for adenopathy (cervical).   Psychiatric/Behavioral:  Negative for dysphoric mood and suicidal ideas. The patient is not nervous/anxious.        Objective:        /66   Pulse 86   Temp 98.1 °F (36.7 °C) (Oral)   Ht 5' 3" (1.6 m)   Wt 88.5 kg (195 lb 1.7 oz)   BMI 34.56 kg/m²      Physical Exam  Constitutional:       General: He is not in acute distress.     Appearance: He is well-developed.   HENT:      Head: Normocephalic and atraumatic.      Right Ear: Tympanic membrane, ear canal and external ear normal.      Left Ear: Tympanic membrane, ear canal and external ear normal.      Nose:      Comments: + inflamed/boggy inferior nasal turbinates.     Mouth/Throat:      Mouth: Mucous membranes are moist.      Pharynx: No oropharyngeal exudate or posterior oropharyngeal erythema.      Comments: + clear film of mucus to posterior OP.  Eyes:      General: No scleral icterus.     " Extraocular Movements: Extraocular movements intact.      Conjunctiva/sclera: Conjunctivae normal.   Cardiovascular:      Rate and Rhythm: Normal rate and regular rhythm.      Heart sounds: Normal heart sounds. No murmur heard.    No friction rub. No gallop.   Pulmonary:      Effort: Pulmonary effort is normal. No respiratory distress.      Breath sounds: Normal breath sounds. No wheezing or rales.   Musculoskeletal:         General: No tenderness or deformity. Normal range of motion.      Cervical back: Normal range of motion.   Lymphadenopathy:      Cervical: No cervical adenopathy.   Skin:     General: Skin is warm and dry.      Findings: No erythema or rash.      Comments: + maceration to few interdigital spaces.     Right lateral malleolus with patchy region hyperpigmentation, lichenification, and overlying scale     Dorsolateral aspect of left foot with patchy regions of hyperpigmentation, lichenification, and overlying scale   Neurological:      Mental Status: He is alert and oriented to person, place, and time.      Cranial Nerves: No cranial nerve deficit.      Motor: No abnormal muscle tone.      Gait: Gait normal.   Psychiatric:         Behavior: Behavior normal.       Assessment:       1. Snoring    2. Tinea pedis of both feet    3. Tobacco use    4. Dermatitis    5. Allergic rhinitis due to other allergic trigger, unspecified seasonality        Plan:       Donnie was seen today for snoring and fatigue.    Diagnoses and all orders for this visit:    Snoring  -     Home Sleep Studies; Future  -     Ambulatory referral/consult to Sleep Disorders; Future    Tinea pedis of both feet    Tobacco use  -     (In Office Administered) Pneumococcal Conjugate Vaccine (20 Valent) (IM)  -     CT Chest Lung Screening Low Dose; Future    Dermatitis  -     triamcinolone acetonide 0.5% (KENALOG) 0.5 % Crea; APPLY  CREAM TOPICALLY TO AFFECTED AREA TWICE DAILY FOR 14 DAYS    Allergic rhinitis due to other allergic  trigger, unspecified seasonality  -     fluticasone propionate (FLONASE) 50 mcg/actuation nasal spray; 2 sprays (100 mcg total) by Each Nostril route once daily.        Snoring  --r/o JARAD  --Refer for HSS   --Refer to sleep med    AR   Hold xyzal. Causing sedation  Start cetirizine 10 mg daily  Start Flonase 2 sprays en daily    Tobacco Use  0.5ppd x 40 years   Obtain baseline CT low dose  PCV-20 today 9/1/2022    Dermatitis; bilateral feet  --Present x 2 years  --Suspect Atopic Dermatitis based on appearance; psoriasis also a possibility given location at extensor surfaces.  --Some improvement with triamcinolone 0.5% BID x 2 weeks. Would like refill   --F/U [facility] prn  --Gentle skin regimen reviewed     Bilateral tinea pedis   --Completed terbinafine 250 mg daily x 4 weeks  --Rx'd Lotrisone by [facility]   --Could consider oral itraconazole if symptoms worsening.  --Refer to dermatology                       HM   PSA 0.2 6/2022  Tdap 6/23/2022  Shingrix #1 done 6/29/2022. Next due 08/29/2022  COVID-19: Had AstraZeneca x 2 doses. Discuss consideration of mRNA vaccines on f/u  C-scope 07/29/2022: -bleeding internal hemorrhoids; no specimens collected. F/U due in 10 years.    Follow up if symptoms worsen or fail to improve.    I spent a total of 30 minutes on the day of the visit.This includes face to face time and non-face to face time preparing to see the patient (eg, review of tests), obtaining and/or reviewing separately obtained history, documenting clinical information in the electronic or other health record, independently interpreting results and communicating results to the patient/family/caregiver, or care coordinator.

## 2022-09-06 ENCOUNTER — TELEPHONE (OUTPATIENT)
Dept: SLEEP MEDICINE | Facility: OTHER | Age: 62
End: 2022-09-06
Payer: MEDICAID

## 2022-09-09 ENCOUNTER — TELEPHONE (OUTPATIENT)
Dept: SLEEP MEDICINE | Facility: OTHER | Age: 62
End: 2022-09-09
Payer: MEDICAID

## 2022-09-12 ENCOUNTER — HOSPITAL ENCOUNTER (OUTPATIENT)
Dept: SLEEP MEDICINE | Facility: OTHER | Age: 62
Discharge: HOME | End: 2022-09-12
Attending: FAMILY MEDICINE
Payer: MEDICAID

## 2022-09-12 DIAGNOSIS — R06.83 SNORING: ICD-10-CM

## 2022-09-12 DIAGNOSIS — G47.33 OBSTRUCTIVE SLEEP APNEA: Primary | ICD-10-CM

## 2022-09-12 PROCEDURE — 95800 SLP STDY UNATTENDED: CPT | Mod: 52

## 2022-09-12 PROCEDURE — 95806 PR SLEEP STUDY, UNATTENDED, SIMUL RECORD HR/O2 SAT/RESP FLOW/RESP EFFT: ICD-10-PCS | Mod: 26,52,, | Performed by: INTERNAL MEDICINE

## 2022-09-12 PROCEDURE — 95806 SLEEP STUDY UNATT&RESP EFFT: CPT | Mod: 26,52,, | Performed by: INTERNAL MEDICINE

## 2022-09-12 NOTE — LETTER
"     September 25, 2022        Salvador Gregory MD  [address]  [address]  [facility] [address]       [facility] [place] - Sleep Lab  [address] [facility] [address]  Phone: [phone]   Patient: Donnie Landon   MR Number: [id number]   YOB: 1960   Date of Visit: 9/12/2022     Dear Dr. Gregory:    The sleep study that you ordered is complete.  You have ordered sleep LAB services to perform the sleep study for Donnie Landon.     Please find Sleep Study result in  the "Media tab" of Chart Review menu.       You can look  for the report in the Media as a procedure document type.    As the ordering provider, you are responsible for reviewing the results and implementing a treatment plan with your patient.     If you need a Sleep Medicine provider to explain the sleep study findings and arrange treatment for the patient, please refer patient for consultation to our Sleep Clinic via [facility] with Ambulatory Consult Sleep.     To do that please place an order for an  "Ambulatory Consult Sleep" - it will go to our clinic work queue for our Medical Assistant to contact the patient for an appointment.     For any questions, please contact our clinic staff at [phone] to talk to clinical staff.     Sincerely,    Fredy Torres MD   CC    No Recipients       "

## 2022-09-13 PROBLEM — R06.83 SNORING: Status: ACTIVE | Noted: 2022-09-13

## 2022-09-16 ENCOUNTER — TELEPHONE (OUTPATIENT)
Dept: PRIMARY CARE CLINIC | Facility: CLINIC | Age: 62
End: 2022-09-16
Payer: MEDICAID

## 2022-09-16 NOTE — TELEPHONE ENCOUNTER
----- Message from Naomi Roy sent at 9/16/2022 11:29 AM CDT -----  Type:  Test Results    Who Called: self     Name of Test (Lab/Mammo/Etc): sleep study     Date of Test: 9/12     Where the test was performed: [facility]     Would the patient rather a call back or a response via [facility]? Call back     Best Call Back Number: [phone]    For Clinical Team:Has the provider reviewed the results?

## 2022-09-16 NOTE — TELEPHONE ENCOUNTER
Spoke to pt informed him once sleep results received and reviewed by PCP he will be contacted. Pt verbalized understanding.

## 2022-09-21 ENCOUNTER — TELEPHONE (OUTPATIENT)
Dept: PRIMARY CARE CLINIC | Facility: CLINIC | Age: 62
End: 2022-09-21
Payer: MEDICAID

## 2022-09-21 NOTE — TELEPHONE ENCOUNTER
----- Message from Salvador Gregory MD sent at 9/21/2022  8:58 AM CDT -----  Regarding: RE: results  Contact: angélica [phone]  JO,     His results are not in yet. This can take a few weeks as a sleep med doc has to interpret the results.     Dr. Gregory  ----- Message -----  From: Cary Silver LPN  Sent: 9/21/2022   8:53 AM CDT  To: Salvador Gregory MD  Subject: FW: results                                        ----- Message -----  From: Raiza Bergeron  Sent: 9/20/2022   1:40 PM CDT  To: Ne Franco Staff  Subject: results                                          2TESTRESULTS    Type: Test Results    What test was performed? Sleep study    Who ordered the test? ne    When and where were the test performed?     Would you like response via [employer]: Please call to discuss    Comments:

## 2022-09-25 PROBLEM — G47.33 OBSTRUCTIVE SLEEP APNEA: Status: ACTIVE | Noted: 2022-09-25

## 2022-09-26 ENCOUNTER — PATIENT MESSAGE (OUTPATIENT)
Dept: PRIMARY CARE CLINIC | Facility: CLINIC | Age: 62
End: 2022-09-26
Payer: MEDICAID

## 2022-10-07 ENCOUNTER — PATIENT MESSAGE (OUTPATIENT)
Dept: PRIMARY CARE CLINIC | Facility: CLINIC | Age: 62
End: 2022-10-07
Payer: MEDICAID

## 2022-11-22 ENCOUNTER — PATIENT MESSAGE (OUTPATIENT)
Dept: PRIMARY CARE CLINIC | Facility: CLINIC | Age: 62
End: 2022-11-22
Payer: MEDICAID

## 2023-04-11 ENCOUNTER — PATIENT MESSAGE (OUTPATIENT)
Dept: RESEARCH | Facility: HOSPITAL | Age: 63
End: 2023-04-11
Payer: MEDICAID

## 2023-04-19 ENCOUNTER — PATIENT MESSAGE (OUTPATIENT)
Dept: RESEARCH | Facility: HOSPITAL | Age: 63
End: 2023-04-19
Payer: MEDICAID

## 2023-05-02 ENCOUNTER — PATIENT MESSAGE (OUTPATIENT)
Dept: RESEARCH | Facility: HOSPITAL | Age: 63
End: 2023-05-02
Payer: MEDICAID

## 2023-06-19 ENCOUNTER — OFFICE VISIT (OUTPATIENT)
Dept: PRIMARY CARE CLINIC | Facility: CLINIC | Age: 63
End: 2023-06-19
Payer: MEDICAID

## 2023-06-19 VITALS
BODY MASS INDEX: 33.83 KG/M2 | HEIGHT: 63 IN | DIASTOLIC BLOOD PRESSURE: 80 MMHG | OXYGEN SATURATION: 97 % | RESPIRATION RATE: 18 BRPM | HEART RATE: 86 BPM | WEIGHT: 190.94 LBS | TEMPERATURE: 98 F | SYSTOLIC BLOOD PRESSURE: 122 MMHG

## 2023-06-19 DIAGNOSIS — J30.2 SEASONAL ALLERGIES: ICD-10-CM

## 2023-06-19 DIAGNOSIS — Z76.89 ENCOUNTER TO ESTABLISH CARE: Primary | ICD-10-CM

## 2023-06-19 DIAGNOSIS — L20.9 ATOPIC DERMATITIS, UNSPECIFIED TYPE: ICD-10-CM

## 2023-06-19 DIAGNOSIS — L60.3 NAIL DYSTROPHY: ICD-10-CM

## 2023-06-19 PROCEDURE — 3008F PR BODY MASS INDEX (BMI) DOCUMENTED: ICD-10-PCS | Mod: CPTII,,, | Performed by: STUDENT IN AN ORGANIZED HEALTH CARE EDUCATION/TRAINING PROGRAM

## 2023-06-19 PROCEDURE — 3074F SYST BP LT 130 MM HG: CPT | Mod: CPTII,,, | Performed by: STUDENT IN AN ORGANIZED HEALTH CARE EDUCATION/TRAINING PROGRAM

## 2023-06-19 PROCEDURE — 99214 OFFICE O/P EST MOD 30 MIN: CPT | Mod: PBBFAC,PN | Performed by: STUDENT IN AN ORGANIZED HEALTH CARE EDUCATION/TRAINING PROGRAM

## 2023-06-19 PROCEDURE — 1160F RVW MEDS BY RX/DR IN RCRD: CPT | Mod: CPTII,,, | Performed by: STUDENT IN AN ORGANIZED HEALTH CARE EDUCATION/TRAINING PROGRAM

## 2023-06-19 PROCEDURE — 1160F PR REVIEW ALL MEDS BY PRESCRIBER/CLIN PHARMACIST DOCUMENTED: ICD-10-PCS | Mod: CPTII,,, | Performed by: STUDENT IN AN ORGANIZED HEALTH CARE EDUCATION/TRAINING PROGRAM

## 2023-06-19 PROCEDURE — 3079F PR MOST RECENT DIASTOLIC BLOOD PRESSURE 80-89 MM HG: ICD-10-PCS | Mod: CPTII,,, | Performed by: STUDENT IN AN ORGANIZED HEALTH CARE EDUCATION/TRAINING PROGRAM

## 2023-06-19 PROCEDURE — 3008F BODY MASS INDEX DOCD: CPT | Mod: CPTII,,, | Performed by: STUDENT IN AN ORGANIZED HEALTH CARE EDUCATION/TRAINING PROGRAM

## 2023-06-19 PROCEDURE — 3079F DIAST BP 80-89 MM HG: CPT | Mod: CPTII,,, | Performed by: STUDENT IN AN ORGANIZED HEALTH CARE EDUCATION/TRAINING PROGRAM

## 2023-06-19 PROCEDURE — 1159F PR MEDICATION LIST DOCUMENTED IN MEDICAL RECORD: ICD-10-PCS | Mod: CPTII,,, | Performed by: STUDENT IN AN ORGANIZED HEALTH CARE EDUCATION/TRAINING PROGRAM

## 2023-06-19 PROCEDURE — 99214 OFFICE O/P EST MOD 30 MIN: CPT | Mod: S$PBB,,, | Performed by: STUDENT IN AN ORGANIZED HEALTH CARE EDUCATION/TRAINING PROGRAM

## 2023-06-19 PROCEDURE — 99999 PR PBB SHADOW E&M-EST. PATIENT-LVL IV: CPT | Mod: PBBFAC,,, | Performed by: STUDENT IN AN ORGANIZED HEALTH CARE EDUCATION/TRAINING PROGRAM

## 2023-06-19 PROCEDURE — 99999 PR PBB SHADOW E&M-EST. PATIENT-LVL IV: ICD-10-PCS | Mod: PBBFAC,,, | Performed by: STUDENT IN AN ORGANIZED HEALTH CARE EDUCATION/TRAINING PROGRAM

## 2023-06-19 PROCEDURE — 99214 PR OFFICE/OUTPT VISIT, EST, LEVL IV, 30-39 MIN: ICD-10-PCS | Mod: S$PBB,,, | Performed by: STUDENT IN AN ORGANIZED HEALTH CARE EDUCATION/TRAINING PROGRAM

## 2023-06-19 PROCEDURE — 1159F MED LIST DOCD IN RCRD: CPT | Mod: CPTII,,, | Performed by: STUDENT IN AN ORGANIZED HEALTH CARE EDUCATION/TRAINING PROGRAM

## 2023-06-19 PROCEDURE — 3074F PR MOST RECENT SYSTOLIC BLOOD PRESSURE < 130 MM HG: ICD-10-PCS | Mod: CPTII,,, | Performed by: STUDENT IN AN ORGANIZED HEALTH CARE EDUCATION/TRAINING PROGRAM

## 2023-06-19 RX ORDER — AZELASTINE HYDROCHLORIDE 0.5 MG/ML
1 SOLUTION/ DROPS OPHTHALMIC 2 TIMES DAILY
Qty: 4 ML | Refills: 11 | Status: SHIPPED | OUTPATIENT
Start: 2023-06-19 | End: 2024-06-18

## 2023-06-19 RX ORDER — TRIAMCINOLONE ACETONIDE 1 MG/G
CREAM TOPICAL 2 TIMES DAILY
Qty: 45 G | Refills: 2 | Status: SHIPPED | OUTPATIENT
Start: 2023-06-19

## 2023-06-19 RX ORDER — SULFAMETHOXAZOLE AND TRIMETHOPRIM 800; 160 MG/1; MG/1
1 TABLET ORAL 2 TIMES DAILY
Qty: 14 TABLET | Refills: 0 | Status: SHIPPED | OUTPATIENT
Start: 2023-06-19 | End: 2023-06-26

## 2023-06-19 NOTE — PROGRESS NOTES
"Subjective:       Patient ID: Donnie Landon is a 62 y.o. male.    Chief Complaint: Establish Care and skin allergy      HPI:  62 y.o. male presents to [facility] to establish care and for skin allergy    Patient reports rash to anterior ankle to bilateral ankles. A few months ago noticed rash to right index finger that has spread into the nail. Would like evaluated. No trauma prior to onset. Feels rash to finger may have spread from rash to ankles.    Patient reports that eyes can water and itch at times. No known eye disease.    Location?: Bilateral anterior ankles, right index finger  New medications?: None  New skin products/soaps/detergent?: None  Exposures?: No known  History of rash/recurrent?: Has been present to ankles for years. No known worsening with weather changes. Waxes and wanes in intensity.  Itching?: Yes  Painful?: No      Review of Systems   Constitutional:  Negative for chills, diaphoresis, fatigue and fever.   Respiratory:  Negative for chest tightness and shortness of breath.    Cardiovascular:  Negative for chest pain and palpitations.   Gastrointestinal:  Negative for abdominal pain, diarrhea, nausea and vomiting.   Musculoskeletal:  Negative for arthralgias, joint swelling and myalgias.   Skin:  Negative for rash and wound.   Neurological:  Negative for dizziness, weakness, light-headedness, numbness and headaches.     Objective:      Vitals:    06/19/23 1331   BP: 122/80   BP Location: Right arm   Patient Position: Sitting   BP Method: Medium (Manual)   Pulse: 86   Resp: 18   Temp: 98 °F (36.7 °C)   TempSrc: Temporal   SpO2: 97%   Weight: 86.6 kg (190 lb 14.7 oz)   Height: 5' 3" (1.6 m)     Physical Exam  Vitals reviewed.   Constitutional:       General: He is not in acute distress.     Appearance: Normal appearance. He is not ill-appearing.   HENT:      Head: Normocephalic and atraumatic.   Eyes:      General:         Right eye: No discharge.         Left eye: No discharge.      " Conjunctiva/sclera: Conjunctivae normal.   Cardiovascular:      Rate and Rhythm: Normal rate and regular rhythm.      Pulses: Normal pulses.      Heart sounds: No murmur heard.  Pulmonary:      Effort: Pulmonary effort is normal.      Breath sounds: Normal breath sounds.   Abdominal:      Palpations: Abdomen is soft.      Tenderness: There is no abdominal tenderness.   Musculoskeletal:         General: No deformity.      Cervical back: Neck supple. No rigidity.   Lymphadenopathy:      Cervical: No cervical adenopathy.   Skin:     General: Skin is warm and dry.      Coloration: Skin is not jaundiced.      Comments: Hyperpigmentation, lichenification, and scale to left anterior ankle. Hypertrophy and lichenification to medial nail index finger right hand with pigmented and dystrophic nail.   Neurological:      General: No focal deficit present.      Mental Status: He is alert and oriented to person, place, and time.   Psychiatric:         Mood and Affect: Mood normal.         Behavior: Behavior normal.           No results found for: NA, K, CL, CO2, BUN, CREATININE, GLUCOSE, ANIONGAP  No results found for: HGBA1C  No results found for: BNP, BNPTRIAGEBLO    No results found for: WBC, HGB, HCT, PLT, GRAN  No results found for: CHOL, HDL, LDLCALC, TRIG       Current Outpatient Medications:     atorvastatin (LIPITOR) 20 MG tablet, atorvastatin 20 mg tablet  Take 1 tablet every day by oral route., Disp: , Rfl:     azelastine (OPTIVAR) 0.05 % ophthalmic solution, Place 1 drop into both eyes 2 (two) times daily., Disp: 4 mL, Rfl: 11    sulfamethoxazole-trimethoprim 800-160mg (BACTRIM DS) 800-160 mg Tab, Take 1 tablet by mouth 2 (two) times daily. for 7 days, Disp: 14 tablet, Rfl: 0    triamcinolone acetonide 0.1% (KENALOG) 0.1 % cream, Apply topically 2 (two) times daily., Disp: 45 g, Rfl: 2        Assessment:       1. Encounter to establish care    2. Atopic dermatitis, unspecified type    3. Nail dystrophy    4. Seasonal  allergies           Plan:       Encounter to establish care  Nail dystrophy  Atopic dermatitis, unspecified type  -     triamcinolone acetonide 0.1% (KENALOG) 0.1 % cream; Apply topically 2 (two) times daily.  Dispense: 45 g; Refill: 2  -     Ambulatory referral/consult to Dermatology; Future; Expected date: 06/26/2023  -     X-Ray Finger 2 or More Views Right; Future; Expected date: 06/19/2023  -     sulfamethoxazole-trimethoprim 800-160mg (BACTRIM DS) 800-160 mg Tab; Take 1 tablet by mouth 2 (two) times daily. for 7 days  Dispense: 14 tablet; Refill: 0  - Suspect atopic dermatitis vs psoriasis. Will attempt treatment with triamcinolone cream and refer to Dermatology for further evaluation of nail    Seasonal allergies  -     azelastine (OPTIVAR) 0.05 % ophthalmic solution; Place 1 drop into both eyes 2 (two) times daily.  Dispense: 4 mL; Refill: 11    RTC in 6 months

## 2023-09-18 ENCOUNTER — PATIENT MESSAGE (OUTPATIENT)
Dept: PRIMARY CARE CLINIC | Facility: CLINIC | Age: 63
End: 2023-09-18
Payer: MEDICAID

## 2023-10-18 ENCOUNTER — PATIENT MESSAGE (OUTPATIENT)
Dept: CARDIOLOGY | Facility: CLINIC | Age: 63
End: 2023-10-18
Payer: MEDICAID

## 2023-11-13 ENCOUNTER — HOSPITAL ENCOUNTER (OUTPATIENT)
Dept: RADIOLOGY | Facility: HOSPITAL | Age: 63
Discharge: HOME | End: 2023-11-13
Attending: INTERNAL MEDICINE
Payer: MEDICAID

## 2023-11-13 DIAGNOSIS — M54.2 NECK PAIN: ICD-10-CM

## 2023-11-13 DIAGNOSIS — M54.2 NECK PAIN: Primary | ICD-10-CM

## 2023-11-13 DIAGNOSIS — M54.50 LOWER BACK PAIN: ICD-10-CM

## 2023-11-13 DIAGNOSIS — Z87.891 PERSONAL HISTORY OF NICOTINE DEPENDENCE: Primary | ICD-10-CM

## 2023-11-13 PROCEDURE — 72110 X-RAY EXAM L-2 SPINE 4/>VWS: CPT | Mod: TC,FY

## 2023-11-13 PROCEDURE — 72110 XR LUMBAR SPINE AP AND LAT WITH FLEX/EXT: ICD-10-PCS | Mod: 26,,, | Performed by: INTERNAL MEDICINE

## 2023-11-13 PROCEDURE — 72050 X-RAY EXAM NECK SPINE 4/5VWS: CPT | Mod: 26,,, | Performed by: INTERNAL MEDICINE

## 2023-11-13 PROCEDURE — 72050 X-RAY EXAM NECK SPINE 4/5VWS: CPT | Mod: TC,FY

## 2023-11-13 PROCEDURE — 72050 XR CERVICAL SPINE AP LAT WITH FLEX EXTEN: ICD-10-PCS | Mod: 26,,, | Performed by: INTERNAL MEDICINE

## 2023-11-13 PROCEDURE — 72110 X-RAY EXAM L-2 SPINE 4/>VWS: CPT | Mod: 26,,, | Performed by: INTERNAL MEDICINE

## 2023-11-14 DIAGNOSIS — E78.2 MIXED HYPERLIPIDEMIA: Primary | ICD-10-CM

## 2023-11-14 RX ORDER — ATORVASTATIN CALCIUM 40 MG/1
40 TABLET, FILM COATED ORAL DAILY
Qty: 90 TABLET | Refills: 3 | Status: SHIPPED | OUTPATIENT
Start: 2023-11-14 | End: 2024-11-13

## 2023-12-13 DIAGNOSIS — R14.0 ABDOMINAL BLOATING: Primary | ICD-10-CM

## 2023-12-21 PROBLEM — N50.89: Status: ACTIVE | Noted: 2023-12-21

## 2023-12-22 ENCOUNTER — ANESTHESIA EVENT (OUTPATIENT)
Dept: SURGERY | Facility: HOSPITAL | Age: 63
End: 2023-12-22
Payer: MEDICAID

## 2023-12-22 ENCOUNTER — ANESTHESIA (OUTPATIENT)
Dept: SURGERY | Facility: HOSPITAL | Age: 63
End: 2023-12-22
Payer: MEDICAID

## 2023-12-22 ENCOUNTER — HOSPITAL ENCOUNTER (OUTPATIENT)
Facility: HOSPITAL | Age: 63
Discharge: HOME | End: 2023-12-22
Attending: SURGERY | Admitting: SURGERY
Payer: MEDICAID

## 2023-12-22 VITALS
TEMPERATURE: 98 F | HEIGHT: 63 IN | DIASTOLIC BLOOD PRESSURE: 81 MMHG | WEIGHT: 190 LBS | HEART RATE: 75 BPM | OXYGEN SATURATION: 98 % | SYSTOLIC BLOOD PRESSURE: 121 MMHG | BODY MASS INDEX: 33.66 KG/M2 | RESPIRATION RATE: 16 BRPM

## 2023-12-22 DIAGNOSIS — G47.33 OBSTRUCTIVE SLEEP APNEA: ICD-10-CM

## 2023-12-22 DIAGNOSIS — N50.89 CYST OF PERINEUM IN MALE: Primary | ICD-10-CM

## 2023-12-22 PROCEDURE — 37000008 HC ANESTHESIA 1ST 15 MINUTES: Performed by: SURGERY

## 2023-12-22 PROCEDURE — D9220A PRA ANESTHESIA: ICD-10-PCS | Mod: CRNA,,, | Performed by: NURSE ANESTHETIST, CERTIFIED REGISTERED

## 2023-12-22 PROCEDURE — 25000003 PHARM REV CODE 250: Performed by: NURSE ANESTHETIST, CERTIFIED REGISTERED

## 2023-12-22 PROCEDURE — D9220A PRA ANESTHESIA: ICD-10-PCS | Mod: ANES,,, | Performed by: ANESTHESIOLOGY

## 2023-12-22 PROCEDURE — 25000003 PHARM REV CODE 250: Performed by: SURGERY

## 2023-12-22 PROCEDURE — 88304 PR  SURG PATH,LEVEL III: ICD-10-PCS | Mod: 26,,, | Performed by: PATHOLOGY

## 2023-12-22 PROCEDURE — D9220A PRA ANESTHESIA: Mod: ANES,,, | Performed by: ANESTHESIOLOGY

## 2023-12-22 PROCEDURE — 88304 TISSUE EXAM BY PATHOLOGIST: CPT | Mod: 26,,, | Performed by: PATHOLOGY

## 2023-12-22 PROCEDURE — 88304 TISSUE EXAM BY PATHOLOGIST: CPT | Performed by: PATHOLOGY

## 2023-12-22 PROCEDURE — 37000009 HC ANESTHESIA EA ADD 15 MINS: Performed by: SURGERY

## 2023-12-22 PROCEDURE — 71000015 HC POSTOP RECOV 1ST HR: Performed by: SURGERY

## 2023-12-22 PROCEDURE — D9220A PRA ANESTHESIA: Mod: CRNA,,, | Performed by: NURSE ANESTHETIST, CERTIFIED REGISTERED

## 2023-12-22 PROCEDURE — 63600175 PHARM REV CODE 636 W HCPCS: Performed by: NURSE ANESTHETIST, CERTIFIED REGISTERED

## 2023-12-22 PROCEDURE — 36000707: Performed by: SURGERY

## 2023-12-22 PROCEDURE — 36000706: Performed by: SURGERY

## 2023-12-22 RX ORDER — HYDROMORPHONE HYDROCHLORIDE 2 MG/ML
0.2 INJECTION, SOLUTION INTRAMUSCULAR; INTRAVENOUS; SUBCUTANEOUS EVERY 5 MIN PRN
Status: CANCELLED | OUTPATIENT
Start: 2023-12-22

## 2023-12-22 RX ORDER — PROPOFOL 10 MG/ML
VIAL (ML) INTRAVENOUS CONTINUOUS PRN
Status: DISCONTINUED | OUTPATIENT
Start: 2023-12-22 | End: 2023-12-22

## 2023-12-22 RX ORDER — DEXAMETHASONE SODIUM PHOSPHATE 4 MG/ML
INJECTION, SOLUTION INTRA-ARTICULAR; INTRALESIONAL; INTRAMUSCULAR; INTRAVENOUS; SOFT TISSUE
Status: DISCONTINUED | OUTPATIENT
Start: 2023-12-22 | End: 2023-12-22

## 2023-12-22 RX ORDER — FENTANYL CITRATE 50 UG/ML
INJECTION, SOLUTION INTRAMUSCULAR; INTRAVENOUS
Status: DISCONTINUED | OUTPATIENT
Start: 2023-12-22 | End: 2023-12-22

## 2023-12-22 RX ORDER — HYDROCODONE BITARTRATE AND ACETAMINOPHEN 5; 325 MG/1; MG/1
1 TABLET ORAL EVERY 6 HOURS PRN
Qty: 24 TABLET | Refills: 0 | Status: SHIPPED | OUTPATIENT
Start: 2023-12-22

## 2023-12-22 RX ORDER — HYDROCODONE BITARTRATE AND ACETAMINOPHEN 5; 325 MG/1; MG/1
1 TABLET ORAL EVERY 4 HOURS PRN
Status: DISCONTINUED | OUTPATIENT
Start: 2023-12-22 | End: 2023-12-22 | Stop reason: HOSPADM

## 2023-12-22 RX ORDER — ONDANSETRON 2 MG/ML
4 INJECTION INTRAMUSCULAR; INTRAVENOUS ONCE AS NEEDED
Status: CANCELLED | OUTPATIENT
Start: 2023-12-22 | End: 2035-05-19

## 2023-12-22 RX ORDER — OXYCODONE HYDROCHLORIDE 5 MG/1
5 TABLET ORAL
Status: CANCELLED | OUTPATIENT
Start: 2023-12-22

## 2023-12-22 RX ORDER — ACETAMINOPHEN 10 MG/ML
INJECTION, SOLUTION INTRAVENOUS
Status: DISCONTINUED | OUTPATIENT
Start: 2023-12-22 | End: 2023-12-22

## 2023-12-22 RX ORDER — ACETAMINOPHEN 325 MG/1
650 TABLET ORAL EVERY 4 HOURS PRN
Status: DISCONTINUED | OUTPATIENT
Start: 2023-12-22 | End: 2023-12-22 | Stop reason: HOSPADM

## 2023-12-22 RX ORDER — LIDOCAINE HYDROCHLORIDE 20 MG/ML
INJECTION INTRAVENOUS
Status: DISCONTINUED | OUTPATIENT
Start: 2023-12-22 | End: 2023-12-22

## 2023-12-22 RX ORDER — ONDANSETRON 2 MG/ML
INJECTION INTRAMUSCULAR; INTRAVENOUS
Status: DISCONTINUED | OUTPATIENT
Start: 2023-12-22 | End: 2023-12-22

## 2023-12-22 RX ORDER — MEPERIDINE HYDROCHLORIDE 50 MG/ML
12.5 INJECTION INTRAMUSCULAR; INTRAVENOUS; SUBCUTANEOUS ONCE AS NEEDED
Status: CANCELLED | OUTPATIENT
Start: 2023-12-22 | End: 2023-12-23

## 2023-12-22 RX ORDER — SODIUM CHLORIDE 0.9 % (FLUSH) 0.9 %
3 SYRINGE (ML) INJECTION
Status: CANCELLED | OUTPATIENT
Start: 2023-12-22

## 2023-12-22 RX ORDER — SODIUM CHLORIDE 9 MG/ML
INJECTION, SOLUTION INTRAVENOUS CONTINUOUS
Status: DISCONTINUED | OUTPATIENT
Start: 2023-12-22 | End: 2023-12-22 | Stop reason: HOSPADM

## 2023-12-22 RX ORDER — MUPIROCIN 20 MG/G
OINTMENT TOPICAL
Status: DISCONTINUED | OUTPATIENT
Start: 2023-12-22 | End: 2023-12-22 | Stop reason: HOSPADM

## 2023-12-22 RX ORDER — MIDAZOLAM HYDROCHLORIDE 1 MG/ML
INJECTION, SOLUTION INTRAMUSCULAR; INTRAVENOUS
Status: DISCONTINUED | OUTPATIENT
Start: 2023-12-22 | End: 2023-12-22

## 2023-12-22 RX ORDER — CEFAZOLIN SODIUM 1 G/3ML
INJECTION, POWDER, FOR SOLUTION INTRAMUSCULAR; INTRAVENOUS
Status: DISCONTINUED | OUTPATIENT
Start: 2023-12-22 | End: 2023-12-22

## 2023-12-22 RX ORDER — PROPOFOL 10 MG/ML
VIAL (ML) INTRAVENOUS
Status: DISCONTINUED | OUTPATIENT
Start: 2023-12-22 | End: 2023-12-22

## 2023-12-22 RX ORDER — LIDOCAINE HYDROCHLORIDE 10 MG/ML
INJECTION, SOLUTION EPIDURAL; INFILTRATION; INTRACAUDAL; PERINEURAL
Status: DISCONTINUED | OUTPATIENT
Start: 2023-12-22 | End: 2023-12-22 | Stop reason: HOSPADM

## 2023-12-22 RX ADMIN — DEXAMETHASONE SODIUM PHOSPHATE 8 MG: 4 INJECTION, SOLUTION INTRA-ARTICULAR; INTRALESIONAL; INTRAMUSCULAR; INTRAVENOUS; SOFT TISSUE at 10:12

## 2023-12-22 RX ADMIN — GLYCOPYRROLATE 0.1 MG: 0.2 INJECTION, SOLUTION INTRAMUSCULAR; INTRAVITREAL at 09:12

## 2023-12-22 RX ADMIN — FENTANYL CITRATE 50 MCG: 50 INJECTION, SOLUTION INTRAMUSCULAR; INTRAVENOUS at 09:12

## 2023-12-22 RX ADMIN — SODIUM CHLORIDE: 0.9 INJECTION, SOLUTION INTRAVENOUS at 08:12

## 2023-12-22 RX ADMIN — PROPOFOL 100 MCG/KG/MIN: 10 INJECTION, EMULSION INTRAVENOUS at 09:12

## 2023-12-22 RX ADMIN — PROPOFOL 50 MG: 10 INJECTION, EMULSION INTRAVENOUS at 09:12

## 2023-12-22 RX ADMIN — ONDANSETRON 8 MG: 2 INJECTION, SOLUTION INTRAMUSCULAR; INTRAVENOUS at 10:12

## 2023-12-22 RX ADMIN — ACETAMINOPHEN 1000 MG: 10 INJECTION, SOLUTION INTRAVENOUS at 09:12

## 2023-12-22 RX ADMIN — LIDOCAINE HYDROCHLORIDE 50 MG: 20 INJECTION, SOLUTION INTRAVENOUS at 09:12

## 2023-12-22 RX ADMIN — MIDAZOLAM 2 MG: 1 INJECTION INTRAMUSCULAR; INTRAVENOUS at 09:12

## 2023-12-22 RX ADMIN — CEFAZOLIN 2 G: 330 INJECTION, POWDER, FOR SOLUTION INTRAMUSCULAR; INTRAVENOUS at 10:12

## 2023-12-22 NOTE — H&P
History & Physical    SUBJECTIVE:     History of Present Illness:  Patient is a 63 y.o. male presents with  cyst perineal area getting bigger in size, no fever or chills    No chief complaint on file.      Review of patient's allergies indicates:  No Known Allergies    Current Facility-Administered Medications   Medication Dose Route Frequency Provider Last Rate Last Admin    0.9%  NaCl infusion   Intravenous Continuous Donnie Andrade MD   New Bag at 12/22/23 0851    mupirocin 2 % ointment   Nasal On Call Procedure Donnie Andrade MD           Past Medical History:   Diagnosis Date    No known health problems      Past Surgical History:   Procedure Laterality Date    COLONOSCOPY  07/29/2022    COLONOSCOPY N/A 7/29/2022    Procedure: COLONOSCOPY;  Surgeon: Cj Reddy MD;  Location: [facility] ENDO;  Service: Endoscopy;  Laterality: N/A;    NO PAST SURGERIES      SOFT TISSUE BIOPSY  07/06/2022    SOFT TISSUE BIOPSY N/A 07/06/2022    Procedure: BIOPSY, SOFT TISSUE, left  back, right back, mid back R flank 5cm;  Surgeon: Jb Bell MD;  Location: [facility] OR;  Service: General;  Laterality: N/A;     Family History   Problem Relation Age of Onset    Hypertension Father     Diabetes Mellitus Father     Heart disease Father     Cancer Neg Hx      Social History     Tobacco Use    Smoking status: Every Day     Current packs/day: 0.50     Average packs/day: 0.5 packs/day for 5.0 years (2.5 ttl pk-yrs)     Types: Cigarettes    Smokeless tobacco: Never   Substance Use Topics    Alcohol use: Never    Drug use: Never        Review of Systems:    Review of Systems   Constitutional: Negative.    HENT: Negative.     Eyes: Negative.    Respiratory: Negative.     Cardiovascular: Negative.    Gastrointestinal: Negative.    Genitourinary: Negative.    Musculoskeletal: Negative.    Neurological: Negative.    Psychiatric/Behavioral: Negative.         OBJECTIVE:     Vital Signs (Most Recent)  Temp: 99.1 °F (37.3 °C)  "(12/22/23 0830)  Pulse: 74 (12/22/23 0830)  Resp: 18 (12/22/23 0830)  BP: 113/67 (12/22/23 0830)  SpO2: 97 % (12/22/23 0830)  5' 3" (1.6 m)  86.2 kg (190 lb)     Physical Exam:    Physical Exam  Constitutional:       Appearance: He is well-developed.   HENT:      Head: Normocephalic.   Eyes:      Conjunctiva/sclera: Conjunctivae normal.      Pupils: Pupils are equal, round, and reactive to light.   Cardiovascular:      Rate and Rhythm: Normal rate and regular rhythm.      Heart sounds: Normal heart sounds.   Pulmonary:      Effort: Pulmonary effort is normal.      Breath sounds: Normal breath sounds.   Abdominal:      General: Bowel sounds are normal.      Palpations: Abdomen is soft.   Musculoskeletal:         General: Normal range of motion.      Cervical back: Normal range of motion and neck supple.   Skin:     General: Skin is warm and dry.   Neurological:      Mental Status: He is alert and oriented to person, place, and time.      Deep Tendon Reflexes: Reflexes are normal and symmetric.       Laboratory      Diagnostic Results:      ASSESSMENT/PLAN:     Cyst perineum      PLAN:Plan exc today.         "

## 2023-12-22 NOTE — ANESTHESIA PREPROCEDURE EVALUATION
12/22/2023  Donnie Landon is a 63 y.o., male.      Pre-op Assessment    I have reviewed the Patient Summary Reports.     I have reviewed the Nursing Notes.    I have reviewed the Medications.     Review of Systems  Anesthesia Hx:             Denies Family Hx of Anesthesia complications.    Denies Personal Hx of Anesthesia complications.                       Patient Active Problem List   Diagnosis    Tinea pedis of both feet    Dermatitis    Snoring    Obstructive sleep apnea    Cyst of perineum in male       Past Medical History:   Diagnosis Date    No known health problems        ECHO: No results found for this or any previous visit.      There is no height or weight on file to calculate BMI.    Tobacco Use: High Risk (12/22/2023)    Patient History     Smoking Tobacco Use: Every Day     Smokeless Tobacco Use: Never     Passive Exposure: Not on file       Social History     Substance and Sexual Activity   Drug Use Never        Alcohol Use: Not on file       Review of patient's allergies indicates:  No Known Allergies      Airway:  No value filed.     Physical Exam  General: Well nourished    Airway:  Mallampati: IV   Mouth Opening: Small, but > 3cm  TM Distance: Normal    Chest/Lungs:  Normal Respiratory Rate        Anesthesia Plan  Type of Anesthesia, risks & benefits discussed:    Anesthesia Type: Gen ETT, Gen Supraglottic Airway  Intra-op Monitoring Plan: Standard ASA Monitors  Post Op Pain Control Plan: multimodal analgesia  Induction:  IV  Airway Plan: Video  Informed Consent: Informed consent signed with the Patient and all parties understand the risks and agree with anesthesia plan.  All questions answered.   ASA Score: 3  Day of Surgery Review of History & Physical: H&P Update referred to the surgeon/provider.    Ready For Surgery From Anesthesia Perspective.     .

## 2023-12-22 NOTE — DISCHARGE INSTRUCTIONS
Keep your bandage clean, dry and in place until your post op visit with Dr. Andrade.  No heavy lifting over 10 lbs or heavier than a full gallon of milk.  Use ice to incision site as needed for help with pain management and swelling as instructed.  Call your doctor for any questions or concerns regarding your surgery.    ANESTHESIA  -For the first 24 hours after surgery:  Do not drive, use heavy equipment, make important decisions, or drink alcohol  -It is normal to feel sleepy for several hours.  Rest until you are more awake.  -Have someone stay with you, if needed.  They can watch for problems and help keep you safe.  -Some possible post anesthesia side effects include: nausea and vomiting, sore throat and hoarseness, sleepiness, and dizziness.    PAIN  -If you have pain after surgery, pain medicine will help you feel better.  Take it as directed, before pain becomes severe.  Most pain relievers taken by mouth need at least 20-30 minutes to start working.  -Do not drive or drink alcohol while taking pain medicine.  -Pain medication can upset your stomach.  Taking them with a little food may help.  -Other ways to help control pain: elevation, ice, and relaxation  -Call your surgeon if still having unmanageable pain an hour after taking pain medicine.  -Pain medicine can cause constipation.  Taking an over-the counter stool softener while on prescription pain medicine and drinking plenty of fluids can prevent this side effect.  -Call your surgeon if you have severe side effects like: breathing problems, trouble waking up, dizziness, confusion, or severe constipation.    NAUSEA  -Some people have nausea after surgery.  This is often because of anesthesia, pain, pain medicine, or the stress of surgery.  -Do not push yourself to eat.  Start off with clear liquids and soup.  Slowly move to solid foods.  Don't eat fatty, rich, spicy foods at first.  Eat smaller amounts.  -If you develop persistent nausea and vomiting  please notify your surgeon immediately.    BLEEDING  -Different types of surgery require different types of care and dressing changes.  It is important to follow all instructions and advice from your surgeon.  Change dressing as directed.  Call your surgeon for any concerns regarding postop bleeding.    SIGNS OF INFECTION  -Signs of infection include: fever, swelling, drainage, and redness  -Notify your surgeon if you have a fever of 100.4 F (38.0 C) or higher.  -Notify your surgeon if you notice redness, swelling, increased pain, pus, or a foul smell at the incision site.

## 2023-12-22 NOTE — ANESTHESIA POSTPROCEDURE EVALUATION
Anesthesia Post Evaluation    Patient: Donnie Landon    Procedure(s) Performed: Procedure(s) (LRB):  EXCISION, CYST Perineum (N/A)    Final Anesthesia Type: general      Patient location during evaluation: PACU  Patient participation: Yes- Able to Participate  Level of consciousness: awake and alert  Post-procedure vital signs: reviewed and stable  Pain management: adequate  Airway patency: patent    PONV status at discharge: No PONV  Anesthetic complications: no      Cardiovascular status: stable  Respiratory status: spontaneous ventilation  Hydration status: euvolemic  Follow-up not needed.              Vitals Value Taken Time   /76 12/22/23 1055   Temp 36.8 °C (98.2 °F) 12/22/23 1040   Pulse 76 12/22/23 1055   Resp 16 12/22/23 1055   SpO2 98 % 12/22/23 1055         No case tracking events are documented in the log.      Pain/Elaine Score: Elaine Score: 10 (12/22/2023 11:35 AM)

## 2023-12-22 NOTE — TRANSFER OF CARE
"Anesthesia Transfer of Care Note    Patient: Donnie Landon    Procedure(s) Performed: Procedure(s) (LRB):  EXCISION, CYST Perineum (N/A)    Patient location: PACU    Transport from OR: Transported from OR on 2-3 L/min O2 by NC with adequate spontaneous ventilation    Post pain: adequate analgesia    Post assessment: no apparent anesthetic complications and tolerated procedure well    Post vital signs: stable    Level of consciousness: awake and alert    Nausea/Vomiting: no nausea/vomiting    Complications: none    Transfer of care protocol was followed      Last vitals: Visit Vitals  /67 (BP Location: Right arm, Patient Position: Lying)   Pulse 74   Temp 37.3 °C (99.1 °F) (Skin)   Resp 18   Ht 5' 3" (1.6 m)   Wt 86.2 kg (190 lb)   SpO2 97%   BMI 33.66 kg/m²     "

## 2023-12-22 NOTE — OP NOTE
[place] - Surgery ([facility])  Operative Note      Date of Procedure: 12/22/2023     Procedure: Procedure(s) (LRB):  EXCISION, CYST Perineum (N/A)   3 x 4 cm  Surgeon(s) and Role:     * Donnie Andrade MD - Primary    Assisting Surgeon: None    Pre-Operative Diagnosis: Cyst of perineum in male [N50.89]    Post-Operative Diagnosis: Post-Op Diagnosis Codes:     * Cyst of perineum in male [N50.89]  3 x 4 cm  Anesthesia: General    Operative Findings (including complications, if any):  Excision perineal cyst 3 x 4 cm done under MAC anesthesia in lithotomy position patient tolerated well no intraop complication sent to recovery room in stable condition.    Description of Technical Procedures:  After satisfactory anesthesia patient in lithotomy position time-out was called patient identity confirmed site was confirmed patient was in lithotomy position area was prepped and draped in a sterile manner using ChloraPrep area of the infected cyst was infiltrated using 1% xylocaine solution elliptical incision was made completely excising the cyst into the deep subcutaneous tissue subcutaneous bleeders were clamped bovied hemostasis packing maintained wound was thoroughly irrigated antibiotic solution complete excision was performed all bleeders were clamped bovied hemostasis was maintained wound was again thoroughly irrigated antibiotic solution was then approximated using 3-0 Vicryl subcutaneous tissue skin was closed using running 4-0 nylon suture Xeroform 4 x 4 sterile gauze dressing was applied instrument counts sponge count counts correct patient tolerated well no intraop complication patient sent to recovery room in stable condition.    Significant Surgical Tasks Conducted by the Assistant(s), if Applicable: na    Estimated Blood Loss (EBL): 30 cc         Implants: * No implants in log *    Specimens:   Specimen (24h ago, onward)       Start     Ordered    12/22/23 1023  Specimen to Pathology, Surgery General Surgery   Once        Comments: Pre-op Diagnosis: Cyst of perineum in male [N50.89]Procedure(s):EXCISION, CYST Perineum Number of specimens: 1Name of specimens: 1 - perineal cyst - perm     References:    Click here for ordering Quick Tip   Question Answer Comment   Procedure Type: General Surgery    Which provider would you like to cc? DANIS RAY    Release to patient Immediate        12/22/23 1023                            Condition: Good    Disposition: PACU - hemodynamically stable.    Attestation: I performed the procedure.    Discharge Note    OUTCOME: Patient tolerated treatment/procedure well without complication and is now ready for discharge.    DISPOSITION: Home or Self Care    FINAL DIAGNOSIS:  Cyst of perineum in male    FOLLOWUP: In clinic7 days    DISCHARGE INSTRUCTIONS:    Discharge Procedure Orders   Diet general     Keep surgical extremity elevated     Lifting restrictions     Leave dressing on - Keep it clean, dry, and intact until clinic visit     Call MD for:  temperature >100.4     Call MD for:  persistent nausea and vomiting     Call MD for:  severe uncontrolled pain     Call MD for:  redness, tenderness, or signs of infection (pain, swelling, redness, odor or green/yellow discharge around incision site)

## 2023-12-26 LAB
FINAL PATHOLOGIC DIAGNOSIS: NORMAL
GROSS: NORMAL
Lab: NORMAL
MICROSCOPIC EXAM: NORMAL

## 2023-12-28 PROBLEM — T81.30XA WOUND DEHISCENCE: Status: ACTIVE | Noted: 2023-12-28

## 2024-09-19 ENCOUNTER — PATIENT MESSAGE (OUTPATIENT)
Dept: PRIMARY CARE CLINIC | Facility: CLINIC | Age: 64
End: 2024-09-19
Payer: MEDICAID

## 2024-10-14 ENCOUNTER — HOSPITAL ENCOUNTER (OUTPATIENT)
Facility: HOSPITAL | Age: 64
Discharge: HOME | End: 2024-10-15
Attending: EMERGENCY MEDICINE | Admitting: INTERNAL MEDICINE
Payer: MEDICAID

## 2024-10-14 DIAGNOSIS — G47.33 OSA (OBSTRUCTIVE SLEEP APNEA): ICD-10-CM

## 2024-10-14 DIAGNOSIS — R29.810 FACIAL DROOP: ICD-10-CM

## 2024-10-14 DIAGNOSIS — I69.392 FACIAL DROOP AS LATE EFFECT OF CEREBROVASCULAR ACCIDENT (CVA): ICD-10-CM

## 2024-10-14 DIAGNOSIS — R20.2 PARESTHESIAS IN LEFT HAND: ICD-10-CM

## 2024-10-14 DIAGNOSIS — K11.8 PAROTID MASS: ICD-10-CM

## 2024-10-14 DIAGNOSIS — I63.131 EMBOLIC STROKE INVOLVING RIGHT CAROTID ARTERY: ICD-10-CM

## 2024-10-14 DIAGNOSIS — I63.9 CEREBROVASCULAR ACCIDENT (CVA), UNSPECIFIED MECHANISM: ICD-10-CM

## 2024-10-14 DIAGNOSIS — R29.818 ACUTE FOCAL NEUROLOGICAL DEFICIT: ICD-10-CM

## 2024-10-14 DIAGNOSIS — R47.1 DYSARTHRIA: Primary | ICD-10-CM

## 2024-10-14 DIAGNOSIS — R29.818 OTHER SYMPTOMS AND SIGNS INVOLVING THE NERVOUS SYSTEM: ICD-10-CM

## 2024-10-14 DIAGNOSIS — I10 PRIMARY HYPERTENSION: ICD-10-CM

## 2024-10-14 PROBLEM — E78.5 HLD (HYPERLIPIDEMIA): Status: ACTIVE | Noted: 2024-10-14

## 2024-10-14 LAB
ALBUMIN SERPL BCP-MCNC: 3.9 G/DL (ref 3.5–5.2)
ALP SERPL-CCNC: 128 U/L (ref 55–135)
ALT SERPL W/O P-5'-P-CCNC: 31 U/L (ref 10–44)
ANION GAP SERPL CALC-SCNC: 12 MMOL/L (ref 8–16)
AST SERPL-CCNC: 23 U/L (ref 10–40)
BASOPHILS # BLD AUTO: 0.07 K/UL (ref 0–0.2)
BASOPHILS NFR BLD: 0.9 % (ref 0–1.9)
BILIRUB SERPL-MCNC: 0.5 MG/DL (ref 0.1–1)
BUN SERPL-MCNC: 21 MG/DL (ref 8–23)
CALCIUM SERPL-MCNC: 9.7 MG/DL (ref 8.7–10.5)
CHLORIDE SERPL-SCNC: 106 MMOL/L (ref 95–110)
CHOLEST SERPL-MCNC: 172 MG/DL (ref 120–199)
CHOLEST/HDLC SERPL: 6.4 {RATIO} (ref 2–5)
CO2 SERPL-SCNC: 23 MMOL/L (ref 23–29)
CREAT SERPL-MCNC: 1.2 MG/DL (ref 0.5–1.4)
DIFFERENTIAL METHOD BLD: ABNORMAL
EOSINOPHIL # BLD AUTO: 0.3 K/UL (ref 0–0.5)
EOSINOPHIL NFR BLD: 4.3 % (ref 0–8)
ERYTHROCYTE [DISTWIDTH] IN BLOOD BY AUTOMATED COUNT: 15.4 % (ref 11.5–14.5)
EST. GFR  (NO RACE VARIABLE): >60 ML/MIN/1.73 M^2
ESTIMATED AVG GLUCOSE: 123 MG/DL (ref 68–131)
GLUCOSE SERPL-MCNC: 145 MG/DL (ref 70–110)
HBA1C MFR BLD: 5.9 % (ref 4–5.6)
HCT VFR BLD AUTO: 47.7 % (ref 40–54)
HDLC SERPL-MCNC: 27 MG/DL (ref 40–75)
HDLC SERPL: 15.7 % (ref 20–50)
HGB BLD-MCNC: 15.8 G/DL (ref 14–18)
IMM GRANULOCYTES # BLD AUTO: 0.02 K/UL (ref 0–0.04)
IMM GRANULOCYTES NFR BLD AUTO: 0.3 % (ref 0–0.5)
LDLC SERPL CALC-MCNC: 110.6 MG/DL (ref 63–159)
LYMPHOCYTES # BLD AUTO: 2 K/UL (ref 1–4.8)
LYMPHOCYTES NFR BLD: 25.6 % (ref 18–48)
MCH RBC QN AUTO: 29.4 PG (ref 27–31)
MCHC RBC AUTO-ENTMCNC: 33.1 G/DL (ref 32–36)
MCV RBC AUTO: 89 FL (ref 82–98)
MONOCYTES # BLD AUTO: 0.8 K/UL (ref 0.3–1)
MONOCYTES NFR BLD: 10.2 % (ref 4–15)
NEUTROPHILS # BLD AUTO: 4.5 K/UL (ref 1.8–7.7)
NEUTROPHILS NFR BLD: 58.7 % (ref 38–73)
NONHDLC SERPL-MCNC: 145 MG/DL
NRBC BLD-RTO: 0 /100 WBC
OHS QRS DURATION: 82 MS
OHS QTC CALCULATION: 416 MS
PLATELET # BLD AUTO: 174 K/UL (ref 150–450)
PMV BLD AUTO: 13.2 FL (ref 9.2–12.9)
POCT GLUCOSE: 151 MG/DL (ref 70–110)
POTASSIUM SERPL-SCNC: 3.9 MMOL/L (ref 3.5–5.1)
PROT SERPL-MCNC: 8.1 G/DL (ref 6–8.4)
RBC # BLD AUTO: 5.37 M/UL (ref 4.6–6.2)
SODIUM SERPL-SCNC: 141 MMOL/L (ref 136–145)
TRIGL SERPL-MCNC: 172 MG/DL (ref 30–150)
TSH SERPL DL<=0.005 MIU/L-ACNC: 0.79 UIU/ML (ref 0.4–4)
WBC # BLD AUTO: 7.73 K/UL (ref 3.9–12.7)

## 2024-10-14 PROCEDURE — 80061 LIPID PANEL: CPT | Performed by: EMERGENCY MEDICINE

## 2024-10-14 PROCEDURE — 93010 ELECTROCARDIOGRAM REPORT: CPT | Mod: ,,, | Performed by: STUDENT IN AN ORGANIZED HEALTH CARE EDUCATION/TRAINING PROGRAM

## 2024-10-14 PROCEDURE — 82962 GLUCOSE BLOOD TEST: CPT

## 2024-10-14 PROCEDURE — 25500020 PHARM REV CODE 255: Performed by: INTERNAL MEDICINE

## 2024-10-14 PROCEDURE — 85025 COMPLETE CBC W/AUTO DIFF WBC: CPT | Performed by: EMERGENCY MEDICINE

## 2024-10-14 PROCEDURE — 93005 ELECTROCARDIOGRAM TRACING: CPT

## 2024-10-14 PROCEDURE — G0378 HOSPITAL OBSERVATION PER HR: HCPCS

## 2024-10-14 PROCEDURE — 99447 NTRPROF PH1/NTRNET/EHR 11-20: CPT | Mod: ,,, | Performed by: STUDENT IN AN ORGANIZED HEALTH CARE EDUCATION/TRAINING PROGRAM

## 2024-10-14 PROCEDURE — 80053 COMPREHEN METABOLIC PANEL: CPT | Performed by: EMERGENCY MEDICINE

## 2024-10-14 PROCEDURE — 84443 ASSAY THYROID STIM HORMONE: CPT | Performed by: EMERGENCY MEDICINE

## 2024-10-14 PROCEDURE — 63600175 PHARM REV CODE 636 W HCPCS: Performed by: INTERNAL MEDICINE

## 2024-10-14 PROCEDURE — 83036 HEMOGLOBIN GLYCOSYLATED A1C: CPT

## 2024-10-14 PROCEDURE — 81000 URINALYSIS NONAUTO W/SCOPE: CPT

## 2024-10-14 PROCEDURE — 96372 THER/PROPH/DIAG INJ SC/IM: CPT | Performed by: INTERNAL MEDICINE

## 2024-10-14 PROCEDURE — 99285 EMERGENCY DEPT VISIT HI MDM: CPT | Mod: 25

## 2024-10-14 RX ORDER — FLUTICASONE PROPIONATE 50 MCG
1 SPRAY, SUSPENSION (ML) NASAL DAILY
Status: DISCONTINUED | OUTPATIENT
Start: 2024-10-15 | End: 2024-10-15 | Stop reason: HOSPADM

## 2024-10-14 RX ORDER — ENOXAPARIN SODIUM 100 MG/ML
40 INJECTION SUBCUTANEOUS EVERY 24 HOURS
Status: DISCONTINUED | OUTPATIENT
Start: 2024-10-14 | End: 2024-10-15 | Stop reason: HOSPADM

## 2024-10-14 RX ORDER — ASPIRIN 81 MG/1
81 TABLET ORAL DAILY
Status: DISCONTINUED | OUTPATIENT
Start: 2024-10-15 | End: 2024-10-15 | Stop reason: HOSPADM

## 2024-10-14 RX ORDER — SODIUM CHLORIDE 0.9 % (FLUSH) 0.9 %
10 SYRINGE (ML) INJECTION
Status: DISCONTINUED | OUTPATIENT
Start: 2024-10-14 | End: 2024-10-15 | Stop reason: HOSPADM

## 2024-10-14 RX ORDER — BISACODYL 10 MG/1
10 SUPPOSITORY RECTAL DAILY PRN
Status: DISCONTINUED | OUTPATIENT
Start: 2024-10-14 | End: 2024-10-15 | Stop reason: HOSPADM

## 2024-10-14 RX ORDER — LOSARTAN POTASSIUM 25 MG/1
25 TABLET ORAL DAILY
COMMUNITY
Start: 2024-10-06

## 2024-10-14 RX ORDER — CLOPIDOGREL BISULFATE 75 MG/1
300 TABLET ORAL ONCE
Status: DISCONTINUED | OUTPATIENT
Start: 2024-10-14 | End: 2024-10-15

## 2024-10-14 RX ORDER — ATORVASTATIN CALCIUM 20 MG/1
20 TABLET, FILM COATED ORAL DAILY
Status: ON HOLD | COMMUNITY
End: 2024-10-15 | Stop reason: HOSPADM

## 2024-10-14 RX ORDER — ASPIRIN 325 MG
325 TABLET, DELAYED RELEASE (ENTERIC COATED) ORAL ONCE
Status: DISCONTINUED | OUTPATIENT
Start: 2024-10-14 | End: 2024-10-15

## 2024-10-14 RX ORDER — ATORVASTATIN CALCIUM 40 MG/1
80 TABLET, FILM COATED ORAL DAILY
Status: DISCONTINUED | OUTPATIENT
Start: 2024-10-14 | End: 2024-10-15 | Stop reason: HOSPADM

## 2024-10-14 RX ORDER — CLOPIDOGREL BISULFATE 75 MG/1
75 TABLET ORAL DAILY
Status: DISCONTINUED | OUTPATIENT
Start: 2024-10-15 | End: 2024-10-15

## 2024-10-14 RX ORDER — ENOXAPARIN SODIUM 100 MG/ML
40 INJECTION SUBCUTANEOUS EVERY 24 HOURS
Status: DISCONTINUED | OUTPATIENT
Start: 2024-10-14 | End: 2024-10-14

## 2024-10-14 RX ORDER — LOSARTAN POTASSIUM 50 MG/1
50 TABLET ORAL DAILY
Status: DISCONTINUED | OUTPATIENT
Start: 2024-10-15 | End: 2024-10-15

## 2024-10-14 RX ADMIN — ENOXAPARIN SODIUM 40 MG: 40 INJECTION SUBCUTANEOUS at 08:10

## 2024-10-14 RX ADMIN — IOHEXOL 100 ML: 350 INJECTION, SOLUTION INTRAVENOUS at 06:10

## 2024-10-14 NOTE — ED NOTES
Patient is awake, alert, oriented, have left side facial droop when he smiles and talks, complains of numbness to his left hand. No numbness to left leg. He denies pain and SOB at this time. No other complaints at this time.

## 2024-10-14 NOTE — PHARMACY MED REC
"      [facility]           Pharmacy  Admission Medication History     The home medication history was taken by Layla Quispe.      Medication history obtained from Medications listed below were obtained from: Patient/family    Based on information gathered for medication list, you may go to "Admission" then "Reconcile Home Medications" tabs to review and/or act upon those items.     The home medication list has been updated by the Pharmacy department.   Please read ALL comments highlighted in yellow.   Please address this information as you see fit.    Feel free to contact us if you have any questions or require assistance.        No current facility-administered medications on file prior to encounter.     Current Outpatient Medications on File Prior to Encounter   Medication Sig Dispense Refill    atorvastatin (LIPITOR) 20 MG tablet Take 20 mg by mouth once daily.      atorvastatin (LIPITOR) 40 MG tablet Take 1 tablet (40 mg total) by mouth once daily. 90 tablet 3    clobetasoL (TEMOVATE) 0.05 % cream Apply topically as needed.      doxycycline (MONODOX) 100 MG capsule Take 100 mg by mouth once daily.      fluticasone propionate (FLONASE) 50 mcg/actuation nasal spray 1 spray by Each Nostril route.      gabapentin (NEURONTIN) 100 MG capsule Take 100 mg by mouth once daily.      losartan (COZAAR) 25 MG tablet Take 25 mg by mouth once daily.      losartan (COZAAR) 50 MG tablet Take 50 mg by mouth.      meloxicam (MOBIC) 15 MG tablet Take 15 mg by mouth once daily.      montelukast (SINGULAIR) 10 mg tablet Take 10 mg by mouth once daily.      oxybutynin (DITROPAN-XL) 10 MG 24 hr tablet Take 10 mg by mouth once daily.      amoxicillin (AMOXIL) 500 MG capsule TAKE 1 CAPSULE BY MOUTH EVERY 6 HOURS FOR 7 DAYS (Patient not taking: Reported on 10/14/2024)      amoxicillin-clavulanate 875-125mg (AUGMENTIN) 875-125 mg per tablet Take 1 tablet by mouth 2 (two) times daily. (Patient not taking: Reported on " 10/14/2024)      azelastine (OPTIVAR) 0.05 % ophthalmic solution Place 1 drop into both eyes 2 (two) times daily. (Patient not taking: Reported on 10/14/2024) 4 mL 11    clotrimazole (LOTRIMIN) 1 % cream APPLY CREAM TOPICALLY TO AFFECTED AREA TWICE DAILY OF FEET FOR TWO WEEKS AS NEEDED (Patient not taking: Reported on 10/14/2024)      clotrimazole-betamethasone 1-0.05% (LOTRISONE) cream APPLY TO THE AFFECTED AND SURROUNDING AREAS OF SKIN BY TOPICAL ROUTE 2 TIMES PER DAY IN THE MORNING AND EVENING FOR 2 WEEKS (Patient not taking: Reported on 10/14/2024)      HYDROcodone-acetaminophen (NORCO) 5-325 mg per tablet Take 1 tablet by mouth every 6 (six) hours as needed for Pain. (Patient not taking: Reported on 10/14/2024) 24 tablet 0    ibuprofen (ADVIL,MOTRIN) 800 MG tablet Take 800 mg by mouth every 6 to 8 hours as needed for Pain.      ketoconazole (NIZORAL) 2 % cream APPLY CREAM TOPICALLY TO AFFECTED AREA ONCE DAILY BETWEEN THE TOES (Patient not taking: Reported on 10/14/2024)      levocetirizine (XYZAL) 5 MG tablet Take 1 tablet every day by oral route at bedtime. (Patient not taking: Reported on 10/14/2024)      loratadine (CLARITIN) 10 mg tablet Take 10 mg by mouth. (Patient not taking: Reported on 10/14/2024)      mupirocin (BACTROBAN) 2 % ointment APPLY TO NAIL BED TWICE DAILY FOR ONE MONTH REPEAT AS NEEDED      neomycin-polymyxin-dexamethasone (DEXACINE) 3.5 mg/g-10,000 unit/g-0.1 % Oint APPLY OINTMENT INTO LEFT EYE AT BEDTIME (Patient not taking: Reported on 10/14/2024)      nystatin (NYSTOP) powder APPLY A THIN LAYER OF POWDER TOPICALLY TO AFFECTED AREA TWICE DAILY FOR 30 DAYS FOR TINEA PEDIS AS A MAINTENANCE THERAPY.      oxyCODONE-acetaminophen (PERCOCET) 5-325 mg per tablet  (Patient not taking: Reported on 10/14/2024)      prednisoLONE acetate (PRED FORTE) 1 % DrpS Place 1 drop into both eyes 3 (three) times daily.      terbinafine HCL (LAMISIL) 250 mg tablet TAKE 1 TABLET BY MOUTH ONCE DAILY FOR TOENAILS  (Patient not taking: Reported on 10/14/2024)      triamcinolone acetonide 0.1% (KENALOG) 0.1 % cream Apply topically 2 (two) times daily. 45 g 2       Please address this information as you see fit.  Feel free to contact us if you have any questions or require assistance.    Layla Quispe  [phone]            .

## 2024-10-14 NOTE — LETTER
Faith accompanied their spouse to the emergency department on 10/14/2024 and throughout his hospitalization through 10/15/2024. They may return to work on 10/16/2024.    If you have any questions or concerns, please don't hesitate to call.      Audra Vivas MD

## 2024-10-14 NOTE — H&P
"[facility] Medicine H&P Note     Admitting Team: [facility] Hospitalist Team A  Attending Physician: Verito Khan MD  Resident: Audra Vivas MD  Intern: Isaiah Villegas MD     Date of Admit: 10/14/2024    Chief Complaint     Cerebrovascular Accident (Patient reports to the ED complaining of left sided facial droop, left eye blurriness, and slurred speech started x2 days ago. Left arm numbness x2 weeks ago. Patient ambulatory to triage, NAD noted. Surekha Singh PA-C in triage, assessing. VAN negative.)      Subjective:      History of Present Illness:  Donnie Landon is a 64 y.o.  male who  has a past medical history of No known health problems.. The patient presented to [facility] on 10/14/2024 with a primary complaint of left sided facial droop, left eye blurriness, and slurred speech started x2 days ago. Left arm numbness x2 weeks ago. Patient ambulatory to triage, NAD noted. Surekha Singh PA-C in triage, assessing. VAN negative.    The patient was in their usual state of health until 1-2 weeks ago when pt began noticing Left arm and hand tingling/numbness and generalized weakness/fatigue.On Friday (10/11) pt began experiencing L sided facial droop and slurred speech. States he planned to go to PCP and was told to come to the ED. Pt has also noticed dysphagia persisting since Friday, describing a possible globus sensation. Says he feels like "something needs to come out or go down" but he is unable to clear it. Pt recently returned from traveling internationally. Pt recently completed a 7 day course of Doxycycline 100mg BID for a sore throat.     Past Medical History:  HTN  HLD  JARAD  Chronic sinusitis  Urinary Urgency      Past Surgical History:  Past Surgical History:   Procedure Laterality Date    COLONOSCOPY  07/29/2022    COLONOSCOPY N/A 7/29/2022    Procedure: COLONOSCOPY;  Surgeon: Cj Reddy MD;  Location: [facility];  Service: Endoscopy;  Laterality: N/A;    CYST REMOVAL N/A " "2023    Procedure: EXCISION, CYST Perineum;  Surgeon: Donnie Andrade MD;  Location: [facility] OR;  Service: General;  Laterality: N/A;    NO PAST SURGERIES      SOFT TISSUE BIOPSY  2022    SOFT TISSUE BIOPSY N/A 2022    Procedure: BIOPSY, SOFT TISSUE, left  back, right back, mid back R flank 5cm;  Surgeon: Jb Bell MD;  Location: [facility] OR;  Service: General;  Laterality: N/A;       Allergies:  Review of patient's allergies indicates:  No Known Allergies    Home Medications:  Lipitor 20 mg daily  Gabapentin 100 mg daily  Losartan 25 mg daily  Montelukast 10 mg daily  Oxybutinin 10 mg PRN  Flonase daily    Family History:  Family History   Problem Relation Name Age of Onset    Hypertension Father      Diabetes Mellitus Father      Heart disease Father      Cancer Neg Hx         Social History:  Social History     Tobacco Use    Smoking status: Every Day     Current packs/day: 0.50     Average packs/day: 0.5 packs/day for 5.0 years (2.5 ttl pk-yrs)     Types: Cigarettes    Smokeless tobacco: Never   Substance Use Topics    Alcohol use: Never    Drug use: Never       Review of Systems:  Pertinent items are noted in HPI. All other systems are reviewed and are negative.    Health Maintaince :   Primary Care Physician: Ajith Sanabria MD    Immunizations:   TDap UTD    Flu Not UTD   Pna UTD    Cancer Screening:  Colonoscopy: UTD      Objective:   Last 24 Hour Vital Signs:  BP  Min: 126/76  Max: 190/89  Temp  Av.1 °F (36.7 °C)  Min: 98.1 °F (36.7 °C)  Max: 98.1 °F (36.7 °C)  Pulse  Av  Min: 68  Max: 90  Resp  Av  Min: 14  Max: 20  SpO2  Av.3 %  Min: 97 %  Max: 100 %  Height  Av' 4" (162.6 cm)  Min: 5' 4" (162.6 cm)  Max: 5' 4" (162.6 cm)  Weight  Av.2 kg (190 lb)  Min: 86.2 kg (190 lb)  Max: 86.2 kg (190 lb)  Body mass index is 32.61 kg/m².  No intake/output data recorded.    Physical Examination:  /72   Pulse 70   Temp 98.1 °F (36.7 °C)   Resp 19   " "Ht 5' 4" (1.626 m)   Wt 86.2 kg (190 lb)   SpO2 100%   BMI 32.61 kg/m²     General Appearance:    Alert, cooperative, no distress, appears stated age   Head:    Normocephalic, without obvious abnormality, atraumatic   Eyes:    PERRL, conjunctiva/corneas clear, EOM's intact, fundi     benign, both eyes        Ears:    Normal external ears, both ears   Nose:   Nares normal   Throat:   Lips, mucosa, and tongue normal; teeth and gums normal   Neck:   Supple, symmetrical, trachea midline, no adenopathy;         Back:     Symmetric, no curvature, ROM normal, no CVA tenderness   Lungs:     Clear to auscultation bilaterally, respirations unlabored   Chest wall:    No tenderness or deformity   Heart:    Regular rate and rhythm, S1 and S2 normal, no murmur, rub   or gallop   Abdomen:     Soft, non-tender, bowel sounds active all four quadrants,     no masses, no organomegaly           Extremities:   Extremities normal, atraumatic, no cyanosis or edema   Pulses:   2+ and symmetric all extremities   Skin:   Skin color, texture, turgor normal, no rashes or lesions   Lymph nodes:   Cervical, supraclavicular, and axillary nodes normal   Neurologic:   CNII - VI intact. Asymmetric facial muscle strength with left lower facial weakness on smile. Symmetric hearing to finger rub. Good eyebrow raise bilaterally. Uvula midline and good palatal elevation. Normal tongue movements in all directions. Normal strength and reflexes in BL extremities. No dysdiadochokinesia. Mild L pronator drift     NIHSS: 4  (L lower facial paralysis, L arm pronator drift, mild/moderate dysarthria)  ABCDE2: 6 (age>60, BP>140/90, Unilateral weakness, symptoms > 60 min)             Laboratory:  Most Recent Data:  CBC:   Lab Results   Component Value Date    WBC 7.73 10/14/2024    HGB 15.8 10/14/2024    HCT 47.7 10/14/2024     10/14/2024    MCV 89 10/14/2024    RDW 15.4 (H) 10/14/2024     WBC Differential: 58.7 % N, 0.3 % Bands, 25.6 % L, 10.2 % M, 4.3 % " "Eo, 0.9 % Baso, no additional cells seen  BMP:   Lab Results   Component Value Date     10/14/2024    K 3.9 10/14/2024     10/14/2024    CO2 23 10/14/2024    BUN 21 10/14/2024    CREATININE 1.2 10/14/2024     (H) 10/14/2024    CALCIUM 9.7 10/14/2024     LFTs:   Lab Results   Component Value Date    PROT 8.1 10/14/2024    ALBUMIN 3.9 10/14/2024    BILITOT 0.5 10/14/2024    AST 23 10/14/2024    ALKPHOS 128 10/14/2024    ALT 31 10/14/2024     Coags: No results found for: "INR", "PROTIME", "PTT"  FLP:   Lab Results   Component Value Date    CHOL 172 10/14/2024    HDL 27 (L) 10/14/2024    LDLCALC 110.6 10/14/2024    TRIG 172 (H) 10/14/2024    CHOLHDL 15.7 (L) 10/14/2024     DM:   Lab Results   Component Value Date    HGBA1C 5.5 11/13/2023    LDLCALC 110.6 10/14/2024    CREATININE 1.2 10/14/2024     Thyroid:   Lab Results   Component Value Date    TSH 0.791 10/14/2024     Anemia:   Lab Results   Component Value Date    [id number] 474 11/13/2023     Cardiac: No results found for: "TROPONINI", "CKTOTAL", "CKMB", "BNP"  Urinalysis: No results found for: "LABURIN", "COLORU", "PHUA", "CLARITYU", "SPECGRAV", "LABSPEC", "NITRITE", "PROTEINUR", "GLUCOSEU", "KETONESU", "UROBILINOGEN", "BILIRUBINUR", "BLOODU", "RBCU", "WBCUA"    Trended Lab Data:  Recent Labs   Lab 10/14/24  1058   WBC 7.73   HGB 15.8   HCT 47.7      MCV 89   RDW 15.4*      K 3.9      CO2 23   BUN 21   CREATININE 1.2   *   PROT 8.1   ALBUMIN 3.9   BILITOT 0.5   AST 23   ALKPHOS 128   ALT 31       Trended Cardiac Data:  No results for input(s): "TROPONINI", "CKTOTAL", "CKMB", "BNP" in the last 168 hours.    Microbiology Data:  Microbiology Results (last 7 days)       ** No results found for the last 168 hours. **              Other Results:  EKG (my interpretation): normal EKG, normal sinus rhythm, there are no previous tracings available for comparison, normal sinus rhythm.    Radiology:  Imaging Results               CT " Head Without Contrast (Final result)  Result time 10/14/24 12:06:06      Final result by Jem Yu MD (10/14/24 12:06:06)                   Impression:      This report was flagged in Epic as abnormal.    1. There are foci of low attenuation within the right corona radiata anterior aspect of the right thalamus suggesting sequela of remote infarcts however no previous exams are available for comparison.  Correlation with current symptomatology is advised, age-indeterminate infarcts not excluded.  2. Sequela of chronic microvascular ischemic change and senescent change.      Electronically signed by: Jem Yu MD  Date:    10/14/2024  Time:    12:06               Narrative:    EXAMINATION:  CT HEAD WITHOUT CONTRAST    CLINICAL HISTORY:  Neuro deficit, acute, stroke suspected;    TECHNIQUE:  Low dose axial images were obtained through the head.  Coronal and sagittal reformations were also performed. Contrast was not administered.    COMPARISON:  None.    FINDINGS:  There is motion artifact.    There is generalized cerebral volume loss.  There is hypoattenuation in a periventricular fashion, likely sequela of chronic microvascular ischemic change.There are a few focal regions of low attenuation within the right corona radiata.  There is a focus of low attenuation within the anterior aspect of the right caudate.  There is no evidence of acute major vascular territory infarct, hemorrhage, or mass.  There is no hydrocephalus.  There are no abnormal extra-axial fluid collections.  The paranasal sinuses and mastoid air cells are clear, and there is no evidence of calvarial fracture.  The visualized soft tissues are unremarkable.                                       Assessment:     Donnie Landon is a 64 y.o. male with PMHx of HTN, HLD, JARAD who presented to the ED with 1-2 weeks of LUE numbness tingling and 3 days of L facial droop, dysarthria, and dysphagia. CT head shows possible sequela of remote R  thalamic infarcts and chronic microvascular changes. Failed bedside swallow evaluation in the ED. Given subacute presentation, pt is not a candidate for thrombolytics at this time. Pt was admitted to [facility] for further workup.      Plan:     Stroke  Facial droop as late effect of cerebrovascular accident  - 1-2 weeks of L upper extremities numbness/tingling  - 3 days of L facial droop, slurred speech, and dysphagia  - /89 on presentation, 138/72 when evaluated by IM team  - CT Head showing possible sequela of remote infarcts in the anterior aspect of the right thalamus and chronic microvascular ischemic/senescent change. No previous studies available for correlation  - Not a candidate for TPA given subacute presentation  - Failed bedside swallow in ED  - NIHSS 4; ABCDE2 6  - EKG appears normal with NSR, no ischemic change  - Lipid Panel: Cholesterol 172, HDL 27, , Trig 172  - TSH 0.79  Plan:  - MRI Brain  - Echo   - ASA 81 mg and Plavix load 300 mg followed by 75 mg daily  - Lipitor 80 mg  - SLP  - PT/OT    Primary Hypertension  - /89 on presentation, 138/72 when evaluated by IM team  - Home regimen Losartan 25 mg daily  - Increased to Losartan 50 mg daily  - Continue to monitor    JARAD  - Pt reports using CPAP at home nightly  - Prior sleep study showing severe obstructive sleep apnea  - Continue CPAP QHS    Hyperlipidemia  - Home regimen lipitor 20 mg   - Increased to Lipitor 80 mg in setting of subacute stroke    Diet: NPO pending swallow eval  DVT Ppx: Lovenox     Dispo: Pending completion of workup    Code Status:     Full Code    Isaiah Villegas MD  [facility] Internal Medicine HO-I    [facility] Medicine Hospitalist Pager numbers:   [facility] Hospitalist Medicine Team A (Erin/Trish): [phone]  [facility] Hospitalist Medicine Team B (David/Kat):  [phone]

## 2024-10-14 NOTE — LETTER
Sami Landon accompanied their father to the emergency department on 10/14/2024 and throughout hospitalization on 10/15/2024. They may return to work on 10/16/2024.      If you have any questions or concerns, please don't hesitate to call.      Audra Vivas MD

## 2024-10-14 NOTE — SUBJECTIVE & OBJECTIVE
HPI:  64 y.o. male with a history of JARAD, HTN, HLD who presented to the ER with left facial droop, slurred speech x 2 days and left arm numbness x2 weeks.  NIHSS 4    /76       Images personally reviewed and interpreted:  Study: CTA Head & Neck and MRI Brain  Study Interpretation: Multifocal areas of acute ischemia involving the right hemisphere, predominantly involving the anterior circulation. No hemorrhage.  Complete occlusion of the right ICA from it's origin with reconstitution at the supraclinoid segment. Intracranial flow is preserved.     Assessment and plan:  63 y/o male with the above risk factors who presents with left arm numbness, dysarthria and left facial droop with initial symptoms onset 2 weeks ago.    Not a candidate for any acute intervention given LKN > 24hrs.    -- Agree with ASA and Plavix load.  Continue DAPT with ASA 81mg daily and Plavix 75mg daily. Duration of treatment to be determined based on most likely etiology after workup is completed.  -- No requirement to target permissive HTN. Can target normotensive goal. However, please avoid aggressive lowering of BP.  -- R carotid appears completely occluded. This does not require any urgent intervention. Suspect a chronic occlusion with carotid stump syndrome being the the most plausible etiology.  -- Lipitor 40mg daily.   -- Check Lipid panel, A1c  -- Target LDL<70, A1c<7   -- TTE w/o bubble (If <60 yrs, please add bubble study)  -- PT/OT/SLP      Lytics recommendation: Thrombolytic therapy not recommended due to Outside of treatment window     Thrombectomy recommendation:  No. LKN > 24 hrs.  Placement recommendation: admit to inpatient

## 2024-10-14 NOTE — LETTER
Oanh Landon accompanied  their grandfather to the emergency department on 10/14/2024 and throughout hospitalization on 10/15/2024. They may return to school on 10/16/2024.      If you have any questions or concerns, please don't hesitate to call.      Audra Vivas MD

## 2024-10-14 NOTE — ED PROVIDER NOTES
Emergency Department Encounter  Provider Note    Donnie Landon  [id number]  10/14/2024    Evaluation:    History Acquisition:     Chief Complaint   Patient presents with    Cerebrovascular Accident     Patient reports to the ED complaining of left sided facial droop, left eye blurriness, and slurred speech started x2 days ago. Left arm numbness x2 weeks ago. Patient ambulatory to triage, NAD noted. Surekha Singh PA-C in triage, assessing. VAN negative.       History of Present Illness:  Donnie Landon is a 64 y.o. male who has a past medical history of No known health problems.    The patient presents to the ED due to slurred speech, facial droop, and drooling.   He reports symptoms have been ongoing for the last 2 days.   He also has had some numbness and tingling to the fingertips of his R hand for the last 2 weeks.  He denies any arm or leg weakness. No dizziness or falls. No CP, SOB, N/V, fever, or any other concerns. No history of stroke.     Additional historians utilized:  none    Prior medical records were reviewed:   Surgery visit 1/18 for f/u cyst excision  Surgery visit 1/2 for wound dehiscence  Perineal cyst excision 12/2023    The patient's list of active medical history, family/social history, medications, and allergies as documented has been reviewed.     Past Medical History:   Diagnosis Date    No known health problems      Past Surgical History:   Procedure Laterality Date    COLONOSCOPY  07/29/2022    COLONOSCOPY N/A 7/29/2022    Procedure: COLONOSCOPY;  Surgeon: Cj Reddy MD;  Location: [facility];  Service: Endoscopy;  Laterality: N/A;    CYST REMOVAL N/A 12/22/2023    Procedure: EXCISION, CYST Perineum;  Surgeon: Donnie Andrade MD;  Location: [facility] OR;  Service: General;  Laterality: N/A;    NO PAST SURGERIES      SOFT TISSUE BIOPSY  07/06/2022    SOFT TISSUE BIOPSY N/A 07/06/2022    Procedure: BIOPSY, SOFT TISSUE, left  back, right back, mid back R flank 5cm;  Surgeon: Jb AMBROCIO  MD Arabella;  Location: [facility] OR;  Service: General;  Laterality: N/A;     Family History   Problem Relation Name Age of Onset    Hypertension Father      Diabetes Mellitus Father      Heart disease Father      Cancer Neg Hx       Social History     Socioeconomic History    Marital status: Unknown   Tobacco Use    Smoking status: Every Day     Current packs/day: 0.50     Average packs/day: 0.5 packs/day for 5.0 years (2.5 ttl pk-yrs)     Types: Cigarettes    Smokeless tobacco: Never   Substance and Sexual Activity    Alcohol use: Never    Drug use: Never   Social History Narrative    From [place], came to [place]    Tobacco: Initiated at age 22 yo; 0.5 ppd at present     EtOH: None    Drug: None    Employment: ; will retire in 2023    Education: 8th grade     Lives with son, wife          Social Drivers of Health     Financial Resource Strain: Low Risk  (10/14/2024)    Overall Financial Resource Strain (CARDIA)     Difficulty of Paying Living Expenses: Not hard at all   Recent Concern: Financial Resource Strain - High Risk (10/14/2024)    Overall Financial Resource Strain (CARDIA)     Difficulty of Paying Living Expenses: Very hard   Food Insecurity: No Food Insecurity (10/14/2024)    Hunger Vital Sign     Worried About Running Out of Food in the Last Year: Never true     Ran Out of Food in the Last Year: Never true   Recent Concern: Food Insecurity - Food Insecurity Present (10/14/2024)    Hunger Vital Sign     Worried About Running Out of Food in the Last Year: Patient declined     Ran Out of Food in the Last Year: Sometimes true   Transportation Needs: No Transportation Needs (10/14/2024)    TRANSPORTATION NEEDS     Transportation : No   Physical Activity: Inactive (10/14/2024)    Exercise Vital Sign     Days of Exercise per Week: 0 days     Minutes of Exercise per Session: 0 min   Stress: Stress Concern Present (10/14/2024)    [employer] [place] [employer] - Occupational  Stress Questionnaire     Feeling of Stress : To some extent   Housing Stability: Low Risk  (10/14/2024)    Housing Stability Vital Sign     Unable to Pay for Housing in the Last Year: No     Homeless in the Last Year: No       Medications:  Current Discharge Medication List        CONTINUE these medications which have NOT CHANGED    Details   !! atorvastatin (LIPITOR) 20 MG tablet Take 20 mg by mouth once daily.      !! atorvastatin (LIPITOR) 40 MG tablet Take 1 tablet (40 mg total) by mouth once daily.  Qty: 90 tablet, Refills: 3    Associated Diagnoses: Mixed hyperlipidemia      clobetasoL (TEMOVATE) 0.05 % cream Apply topically as needed.      doxycycline (MONODOX) 100 MG capsule Take 100 mg by mouth once daily.      fluticasone propionate (FLONASE) 50 mcg/actuation nasal spray 1 spray by Each Nostril route.      gabapentin (NEURONTIN) 100 MG capsule Take 100 mg by mouth once daily.      !! losartan (COZAAR) 25 MG tablet Take 25 mg by mouth once daily.      !! losartan (COZAAR) 50 MG tablet Take 50 mg by mouth.      montelukast (SINGULAIR) 10 mg tablet Take 10 mg by mouth once daily.      oxybutynin (DITROPAN-XL) 10 MG 24 hr tablet Take 10 mg by mouth once daily.      amoxicillin (AMOXIL) 500 MG capsule TAKE 1 CAPSULE BY MOUTH EVERY 6 HOURS FOR 7 DAYS      amoxicillin-clavulanate 875-125mg (AUGMENTIN) 875-125 mg per tablet Take 1 tablet by mouth 2 (two) times daily.      azelastine (OPTIVAR) 0.05 % ophthalmic solution Place 1 drop into both eyes 2 (two) times daily.  Qty: 4 mL, Refills: 11    Associated Diagnoses: Seasonal allergies      clotrimazole (LOTRIMIN) 1 % cream APPLY CREAM TOPICALLY TO AFFECTED AREA TWICE DAILY OF FEET FOR TWO WEEKS AS NEEDED      clotrimazole-betamethasone 1-0.05% (LOTRISONE) cream APPLY TO THE AFFECTED AND SURROUNDING AREAS OF SKIN BY TOPICAL ROUTE 2 TIMES PER DAY IN THE MORNING AND EVENING FOR 2 WEEKS      HYDROcodone-acetaminophen (NORCO) 5-325 mg per tablet Take 1 tablet by mouth  every 6 (six) hours as needed for Pain.  Qty: 24 tablet, Refills: 0    Comments: Quantity prescribed more than 7 day supply? Yes, quantity medically necessary      ketoconazole (NIZORAL) 2 % cream APPLY CREAM TOPICALLY TO AFFECTED AREA ONCE DAILY BETWEEN THE TOES      levocetirizine (XYZAL) 5 MG tablet Take 1 tablet every day by oral route at bedtime.      loratadine (CLARITIN) 10 mg tablet Take 10 mg by mouth.      mupirocin (BACTROBAN) 2 % ointment APPLY TO NAIL BED TWICE DAILY FOR ONE MONTH REPEAT AS NEEDED      neomycin-polymyxin-dexamethasone (DEXACINE) 3.5 mg/g-10,000 unit/g-0.1 % Oint APPLY OINTMENT INTO LEFT EYE AT BEDTIME      nystatin (NYSTOP) powder APPLY A THIN LAYER OF POWDER TOPICALLY TO AFFECTED AREA TWICE DAILY FOR 30 DAYS FOR TINEA PEDIS AS A MAINTENANCE THERAPY.      oxyCODONE-acetaminophen (PERCOCET) 5-325 mg per tablet       prednisoLONE acetate (PRED FORTE) 1 % DrpS Place 1 drop into both eyes 3 (three) times daily.      terbinafine HCL (LAMISIL) 250 mg tablet TAKE 1 TABLET BY MOUTH ONCE DAILY FOR TOENAILS      triamcinolone acetonide 0.1% (KENALOG) 0.1 % cream Apply topically 2 (two) times daily.  Qty: 45 g, Refills: 2    Associated Diagnoses: Atopic dermatitis, unspecified type       !! - Potential duplicate medications found. Please discuss with provider.          Allergies:  Review of patient's allergies indicates:  No Known Allergies      Physical Exam:     Initial Vitals [10/14/24 1006]   BP Pulse Resp Temp SpO2   (!) 190/89 90 16 98.1 °F (36.7 °C) 99 %      MAP       --         Physical Exam    Nursing note and vitals reviewed.  Constitutional: He appears well-developed and well-nourished. He is not diaphoretic. No distress.   HENT:   Head: Normocephalic and atraumatic. Mouth/Throat: Oropharynx is clear and moist.   Eyes: EOM are normal. Pupils are equal, round, and reactive to light.   Neck: No tracheal deviation present.   Cardiovascular:  Normal rate, regular rhythm, normal heart  sounds and intact distal pulses.           Pulmonary/Chest: Breath sounds normal. No stridor. No respiratory distress.   Abdominal: Abdomen is soft. He exhibits no distension and no mass. There is no abdominal tenderness.   Musculoskeletal:         General: No edema. Normal range of motion.     Neurological: He is alert and oriented to person, place, and time. He has normal strength. A cranial nerve deficit is present. No sensory deficit. He exhibits normal muscle tone. Gait normal. GCS eye subscore is 4. GCS verbal subscore is 5. GCS motor subscore is 6.   L-sided facial droop. Forehead spared.   Moderate dysarthria.   Diminished sensation to light touch to fingertips of L hand.  No focal extremity weakness.   Ambulatory.    Skin: Skin is warm and dry. Capillary refill takes less than 2 seconds. No rash noted.   Psychiatric: He has a normal mood and affect. His behavior is normal. Thought content normal.         Differential Diagnoses:   Based on available information and initial assessment, Differential Diagnosis includes, but is not limited to:  CVA/TIA, intracranial mass/hemorrhage, head trauma, seizure, status epilepticus, post-ictal state, meningitis/encephalitis, sepsis, MI/ACS, arrhythmia, syncope,   anaphylaxis, thyroid disease, neuroleptic malignant syndrome, serotonin syndrome, CO poisoning, hypoxia/hypercapnea, uremic/hepatic encephalopathy, medication reaction, intentional overdose, metabolic derangement, psychiatric disturbance, substance abuse, alcohol intoxication/withdrawal, hypoglycemia/hyperglycemia, complicated migraine.      ED Management:   Procedures    Orders Placed This Encounter    CT Head Without Contrast    MRI Brain Without Contrast    CTA Head and Neck (xpd)    CBC W/ AUTO DIFFERENTIAL    Comprehensive metabolic panel    TSH    LDL - Lipid Panel    Urinalysis, Reflex to Urine Culture Urine, Clean Catch    Comprehensive metabolic panel    Magnesium    Phosphorus    Hemoglobin A1c    CBC  auto differential    APTT    Protime-INR    Urinalysis, Reflex to Urine Culture Urine, Clean Catch    Urinalysis Microscopic    Diet NPO    Vital signs    Neuro checks:  LOC/AVPU, Orientation, GCS if LOC altered, Pupils if LOC altered or GCS <10, Speech/Language, Facial Symmetry, Motor    Complete Escobar Screening Assessment No eating, drinking, or oral medications until patient passes the screen. Notify MD of results.    Complete NIH Stroke Scale PRN with any deterioration or worsening of neurologic condition.    Vital signs    Do not treat BP unless > 220/120; Goal -219 and -119    Check temperature    Neuro checks:  LOC/AVPU, Orientation, GCS if LOC altered, Pupils if LOC altered or GCS <10, Speech/Language, Facial Symmetry, Motor    Intake and output    Measure height or length    Weigh patient    Skin assessment    Passive range of motion to affected extremity    Provide stroke education    Contact MD / APC if patient requires a face mask to maintain adequate oxygenation    Tobacco cessation counseling    Complete NIH Stroke Scale PRN with any deterioration or worsening of neurologic condition.    Complete NIH Stroke Scale Notify provider of NIHSS increase of 4 points or greater    Toilet out of bed or at bedside commode    Notify Provider    Notify Provider    Complete Escobar Screening Assessment No eating, drinking, or oral medications until patient passes the screen. Notify MD of results.    Place sequential compression device    Vital signs    Do not treat BP unless > 220/120; Goal -219 and -119    Check temperature    Cardiac Monitoring - Adult    Neuro checks:  LOC/AVPU, Orientation, GCS if LOC altered, Pupils if LOC altered or GCS <10, Speech/Language, Facial Symmetry, Motor    Intake and output    Measure height or length    Weigh patient    Skin assessment    Passive range of motion to affected extremity    Provide stroke education    Contact MD / APC if patient requires a  face mask to maintain adequate oxygenation    Straight Cath    Tobacco cessation counseling    Complete NIH Stroke Scale PRN with any deterioration or worsening of neurologic condition.    Complete NIH Stroke Scale Notify provider of NIHSS increase of 4 points or greater    Toilet out of bed or at bedside commode    Notify Provider    Notify Provider    Full code    IP consult to case management/social work    Inpatient Consult Tele-Vascular Neurology    Inpatient consult to Social Work/Case Management    OT evaluate and treat    PT evaluate and treat    CPAP QHS    Pulse Oximetry Q4H    SLP Cognition and voice evaluate and treat    SLP Swallowing evaluate and treat    ECG 12 lead    Echo    Insert peripheral IV    Insert saline lock    Possible Hospitalization    Place in Observation    Aspiration precautions    Fall precautions    Aspiration precautions    Fall precautions    Reason for not Prescribing Antilipidemic    REASON FOR NOT PRESCRIBING ANTITHROMBOTIC THERAPY AT DISCHARGE    POCT glucose    fluticasone propionate 50 mcg/actuation nasal spray 50 mcg    losartan tablet 50 mg    sodium chloride 0.9% flush 10 mL    bisacodyL suppository 10 mg    atorvastatin tablet 80 mg    aspirin EC tablet 325 mg    clopidogreL tablet 300 mg    clopidogreL tablet 75 mg    aspirin EC tablet 81 mg    iohexoL (OMNIPAQUE 350) injection 100 mL    enoxaparin injection 40 mg    influenza (Flulaval, Fluzone, Fluarix) 45 mcg/0.5 mL IM vaccine (> or = 6 mo) 0.5 mL    magnesium sulfate 2g in water 50mL IVPB (premix)    IP VTE HIGH RISK PATIENT    Progressive Mobility Protocol (mobilize patient to their highest level of functioning at least twice daily)    Progressive Mobility Protocol (mobilize patient to their highest level of functioning at least twice daily)          EKG:   EKG interpretation by ED attending physician:  NSR, rate 76, no ST changes, no ischemia, normal intervals.  No prior for comparison.       Labs:     Labs  Reviewed   CBC W/ AUTO DIFFERENTIAL - Abnormal       Result Value    WBC 7.73      RBC 5.37      Hemoglobin 15.8      Hematocrit 47.7      MCV 89      MCH 29.4      MCHC 33.1      RDW 15.4 (*)     Platelets 174      MPV 13.2 (*)     Immature Granulocytes 0.3      Gran # (ANC) 4.5      Immature Grans (Abs) 0.02      Lymph # 2.0      Mono # 0.8      Eos # 0.3      Baso # 0.07      nRBC 0      Gran % 58.7      Lymph % 25.6      Mono % 10.2      Eosinophil % 4.3      Basophil % 0.9      Differential Method Automated     COMPREHENSIVE METABOLIC PANEL - Abnormal    Sodium 141      Potassium 3.9      Chloride 106      CO2 23      Glucose 145 (*)     BUN 21      Creatinine 1.2      Calcium 9.7      Total Protein 8.1      Albumin 3.9      Total Bilirubin 0.5      Alkaline Phosphatase 128      AST 23      ALT 31      eGFR >60      Anion Gap 12     LIPID PANEL - Abnormal    Cholesterol 172      Triglycerides 172 (*)     HDL 27 (*)     LDL Cholesterol 110.6      HDL/Cholesterol Ratio 15.7 (*)     Total Cholesterol/HDL Ratio 6.4 (*)     Non-HDL Cholesterol 145     HEMOGLOBIN A1C - Abnormal    Hemoglobin A1C 5.9 (*)     Estimated Avg Glucose 123      Narrative:     Collection has been rescheduled by KW5 at 10/14/2024 17:15 Reason:   Patient unavailable pt gone to MRI   POCT GLUCOSE - Abnormal    POCT Glucose 151 (*)    TSH    TSH 0.791       Independent review of the labs ordered include:   See ED course    Imaging:     Imaging Results               CTA Head and Neck (xpd) (Final result)  Result time 10/14/24 22:42:08      Final result by Cj Tian MD (10/14/24 22:42:08)                   Impression:      Occlusion of the right internal carotid artery with reconstitution of the upper carotid siphon near the ophthalmic division.  No string sign identified.    No intracranial stenosis or occlusion.    No evidence of infarct or enhancing intracranial mass.    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  It appears well encapsulated.  Differential includes benign and malignant parotid tumors.  ENT consultation advised.    This report was flagged in Epic as abnormal.      Electronically signed by: Cj Tian  Date:    10/14/2024  Time:    22:42               Narrative:    EXAMINATION:  CTA HEAD AND NECK (XPD)    CLINICAL HISTORY:  Neuro deficit, acute, stroke suspected;Other symptoms and signs involving the nervous system    TECHNIQUE:  Non contrast low dose axial images were obtained through the head. CT angiogram was performed from the level of the blaine to the top of the head following the IV administration of 100mL of Omnipaque 350.   Sagittal and coronal reconstructions and maximum intensity projection reconstructions were performed. Arterial stenosis percentages are based on NASCET measurement criteria.    COMPARISON:  MRI of the brain, 10/14/2024 at 17:28 hours    FINDINGS:  Intracranial Compartment:    Ventricles and sulci are normal in size for age without evidence of hydrocephalus. No extra-axial blood or fluid collections.    The brain parenchyma appears normal. No parenchymal mass, hemorrhage, edema, or major vascular distribution infarct.    Skull/Extracranial Contents (limited evaluation): No fracture. Mastoid air cells and paranasal sinuses are essentially clear.    Non-Vascular Structures of the Neck/Thoracic Inlet (limited evaluation): 2.2 x 2 x 4 cm mixed enhancing solid tumor of the left parotid gland.  It appears well encapsulated.  Multilevel cervical spondylosis with generalized central canal stenosis.    Aorta: Normal 3 vessel arch.    Extracranial carotid circulation: Occlusion of the right ICA at its origin in the right bulb is noted.  It reconstitutes at the ophthalmic level in the carotid siphon likely from collaterals with adequate flow throughout the middle and anterior cerebral vessels bilaterally.  No string sign with nearby filling of the ascending pharyngeal  artery.    Extracranial vertebral circulation: No hemodynamically significant stenosis, aneurysmal dilatation, or dissection.  Mild left-sided dominance.    Intracranial Arteries: No focal high-grade stenosis, occlusion, or aneurysm.    Venous structures (limited evaluation): Normal.                                       MRI Brain Without Contrast (Final result)  Result time 10/14/24 18:03:22      Final result by Jim Robledo MD (10/14/24 18:03:22)                   Impression:      Scattered foci of diffusion restriction in the right cerebral hemisphere, suggestive of acute embolic infarctions in the right cerebral hemisphere predominantly within the anterior circulation.  No evidence of hemorrhage.    Absence of the normal flow void within the right ICA.  A more proximal right ICA occlusion is suspected.  CTA of the head and neck is recommended for further evaluation.    Case discussed with Dr. Vivas on 10/14/2024 18:00.      Electronically signed by: Jim Robledo MD  Date:    10/14/2024  Time:    18:03               Narrative:    EXAMINATION:  MRI BRAIN WITHOUT CONTRAST    CLINICAL HISTORY:  Stroke, follow up;    TECHNIQUE:  Multiplanar multisequence MR imaging of the brain was performed without contrast.    COMPARISON:  CT scan of the head dated 10/14/2024.    FINDINGS:  The craniocervical junction is intact.  There is empty sella configuration.  There is absence of the normal flow void signal within the right ICA.  This may represent a more proximal occlusion.  The remainder of the intracranial flow voids are within normal limits.    There are multiple foci of diffusion restriction within the right frontal and parietal convexities.  There is also focus of diffusion restriction in the right parietooccipital junction.  There is a focus of diffusion restriction within the right centrum semiovale.  There is T2/FLAIR signal hyperintensities corresponding to the regions of diffusion restriction.    The ventricles  and sulci are prominent, suggestive of mild cerebral volume loss.  There are no extra-axial fluid collections.  There is no evidence of intracranial hemorrhage.    The orbits and intraorbital contents are unremarkable.  The paranasal sinuses are unremarkable.  There are bilateral mastoid effusions.                                        CT Head Without Contrast (Final result)  Result time 10/14/24 12:06:06      Final result by Jem Yu MD (10/14/24 12:06:06)                   Impression:      This report was flagged in Epic as abnormal.    1. There are foci of low attenuation within the right corona radiata anterior aspect of the right thalamus suggesting sequela of remote infarcts however no previous exams are available for comparison.  Correlation with current symptomatology is advised, age-indeterminate infarcts not excluded.  2. Sequela of chronic microvascular ischemic change and senescent change.      Electronically signed by: Jem Yu MD  Date:    10/14/2024  Time:    12:06               Narrative:    EXAMINATION:  CT HEAD WITHOUT CONTRAST    CLINICAL HISTORY:  Neuro deficit, acute, stroke suspected;    TECHNIQUE:  Low dose axial images were obtained through the head.  Coronal and sagittal reformations were also performed. Contrast was not administered.    COMPARISON:  None.    FINDINGS:  There is motion artifact.    There is generalized cerebral volume loss.  There is hypoattenuation in a periventricular fashion, likely sequela of chronic microvascular ischemic change.There are a few focal regions of low attenuation within the right corona radiata.  There is a focus of low attenuation within the anterior aspect of the right caudate.  There is no evidence of acute major vascular territory infarct, hemorrhage, or mass.  There is no hydrocephalus.  There are no abnormal extra-axial fluid collections.  The paranasal sinuses and mastoid air cells are clear, and there is no evidence of calvarial  fracture.  The visualized soft tissues are unremarkable.                                         Medications Given:     Medications   fluticasone propionate 50 mcg/actuation nasal spray 50 mcg (has no administration in time range)   losartan tablet 50 mg (has no administration in time range)   sodium chloride 0.9% flush 10 mL (has no administration in time range)   bisacodyL suppository 10 mg (has no administration in time range)   atorvastatin tablet 80 mg (has no administration in time range)   aspirin EC tablet 325 mg (has no administration in time range)   clopidogreL tablet 300 mg (has no administration in time range)   clopidogreL tablet 75 mg (has no administration in time range)   aspirin EC tablet 81 mg (has no administration in time range)   enoxaparin injection 40 mg (40 mg Subcutaneous Given 10/14/24 2014)   influenza (Flulaval, Fluzone, Fluarix) 45 mcg/0.5 mL IM vaccine (> or = 6 mo) 0.5 mL (has no administration in time range)   iohexoL (OMNIPAQUE 350) injection 100 mL (100 mLs Intravenous Given 10/14/24 1845)   magnesium sulfate 2g in water 50mL IVPB (premix) (0 g Intravenous Stopped 10/15/24 0851)        Medical Decision Making:    Additional Consideration:   Additional testing considered during clinical course: none    Social determinants of health considered during development of treatment plan include: poor access to care    Current co-morbidities considered which impacted clinical decision making: JARAD    Case discussed with additional provider: [facility], Dr. Villegas, will admit for further management of subacute stroke    ED Course as of 10/15/24 0934   Mon Oct 14, 2024   1147 SpO2: 99 % [SS]   1147 Resp: 16 [SS]   1147 Pulse: 90 [SS]   1147 Temp: 98.1 °F (36.7 °C) [SS]   1147 BP(!): 190/89  Hypertensive, vitals otherwise reassuring  [SS]   1147 BP: 139/68  BP improved without intervention  [SS]   1147 LDL - Lipid Panel(!)  Triglycerides elevated  [SS]   1147 Comprehensive metabolic  panel(!)  Unremarkable  [SS]   1147 CBC W/ AUTO DIFFERENTIAL(!)  Unremarkable  [SS]   1147 CT Head Without Contrast  CT head independently interpreted: no intracranial hemorrhage, mass effect, or midline shift. Possible age indeterminate thalamic infarct.  Agree with radiologist interpretation.    [SS]      ED Course User Index  [SS] Low Morin MD            Medical Decision Making  65 yo M with facial droop, slurred speech x 2 days.  Symptoms concerning for subacute stroke, but patient out of window for TNK or IR intervention.  CT head concerning for thalamic infarct, which correlates with patient's symptoms.  MRI ordered.  [facility] contacted for admission and further management.       Problems Addressed:  Acute focal neurological deficit: acute illness or injury  Dysarthria: acute illness or injury  Facial droop: acute illness or injury  Paresthesias in left hand: acute illness or injury    Amount and/or Complexity of Data Reviewed  External Data Reviewed: notes.  Labs: ordered. Decision-making details documented in ED Course.  Radiology: ordered and independent interpretation performed. Decision-making details documented in ED Course.  ECG/medicine tests: ordered and independent interpretation performed. Decision-making details documented in ED Course.    Risk  Decision regarding hospitalization.  Diagnosis or treatment significantly limited by social determinants of health.        Clinical Impression:       ICD-10-CM ICD-9-CM   1. Dysarthria  R47.1 784.51   2. Acute focal neurological deficit  R29.818 781.99   3. Cerebrovascular accident (CVA), unspecified mechanism  I63.9 434.91   4. JARAD (obstructive sleep apnea)  G47.33 327.23   5. Other symptoms and signs involving the nervous system  R29.818 781.99   6. Facial droop  R29.810 781.94   7. Paresthesias in left hand  R20.2 782.0         Follow-up Information    None          ED Disposition Condition    Observation Stable              On re-evaluation, the  patient's status has remained stable.  At this time, I believe the patient should be admitted to the hospital for further evaluation and management.  The consulting physician/team agrees with plan and will admit under their service.   The patient and family were updated with test results, overall impression, and further plan of care. All questions were answered.       Low Morin MD  10/15/24 [id number]

## 2024-10-14 NOTE — LETTER
Christy Landon accompanied their grandfather to the emergency department on 10/14/2024 and throughout hospitalization on 10/15/2024. They may return to school on 10/16/2024.      If you have any questions or concerns, please don't hesitate to call.      Audra Vivas MD

## 2024-10-15 ENCOUNTER — CLINICAL SUPPORT (OUTPATIENT)
Dept: SMOKING CESSATION | Facility: CLINIC | Age: 64
End: 2024-10-15
Payer: COMMERCIAL

## 2024-10-15 VITALS
SYSTOLIC BLOOD PRESSURE: 140 MMHG | TEMPERATURE: 97 F | DIASTOLIC BLOOD PRESSURE: 73 MMHG | OXYGEN SATURATION: 99 % | HEIGHT: 64 IN | WEIGHT: 194.88 LBS | RESPIRATION RATE: 18 BRPM | HEART RATE: 76 BPM | BODY MASS INDEX: 33.27 KG/M2

## 2024-10-15 DIAGNOSIS — F17.210 CIGARETTE SMOKER: Primary | ICD-10-CM

## 2024-10-15 PROBLEM — K11.8 PAROTID MASS: Chronic | Status: ACTIVE | Noted: 2024-10-15

## 2024-10-15 LAB
ALBUMIN SERPL BCP-MCNC: 3.7 G/DL (ref 3.5–5.2)
ALP SERPL-CCNC: 131 U/L (ref 55–135)
ALT SERPL W/O P-5'-P-CCNC: 29 U/L (ref 10–44)
ANION GAP SERPL CALC-SCNC: 12 MMOL/L (ref 8–16)
APICAL FOUR CHAMBER EJECTION FRACTION: 58 %
APICAL TWO CHAMBER EJECTION FRACTION: 69 %
APTT PPP: 35.4 SEC (ref 21–32)
AST SERPL-CCNC: 23 U/L (ref 10–40)
AV INDEX (PROSTH): 0.76
AV MEAN GRADIENT: 4 MMHG
AV PEAK GRADIENT: 7.8 MMHG
AV VALVE AREA BY VELOCITY RATIO: 2 CM²
AV VALVE AREA: 2.4 CM²
AV VELOCITY RATIO: 0.64
BACTERIA #/AREA URNS HPF: NORMAL /HPF
BASOPHILS # BLD AUTO: 0.07 K/UL (ref 0–0.2)
BASOPHILS NFR BLD: 0.8 % (ref 0–1.9)
BILIRUB SERPL-MCNC: 0.4 MG/DL (ref 0.1–1)
BILIRUB UR QL STRIP: NEGATIVE
BILIRUB UR QL STRIP: NEGATIVE
BSA FOR ECHO PROCEDURE: 1.97 M2
BUN SERPL-MCNC: 16 MG/DL (ref 8–23)
CALCIUM SERPL-MCNC: 9.5 MG/DL (ref 8.7–10.5)
CHLORIDE SERPL-SCNC: 107 MMOL/L (ref 95–110)
CLARITY UR: CLEAR
CLARITY UR: CLEAR
CO2 SERPL-SCNC: 19 MMOL/L (ref 23–29)
COLOR UR: YELLOW
COLOR UR: YELLOW
CREAT SERPL-MCNC: 1 MG/DL (ref 0.5–1.4)
CV ECHO LV RWT: 0.55 CM
DIFFERENTIAL METHOD BLD: ABNORMAL
DOP CALC AO PEAK VEL: 1.4 M/S
DOP CALC AO VTI: 25.5 CM
DOP CALC LVOT AREA: 3.1 CM2
DOP CALC LVOT DIAMETER: 2 CM
DOP CALC LVOT PEAK VEL: 0.9 M/S
DOP CALC LVOT STROKE VOLUME: 61.2 CM3
DOP CALC MV VTI: 26.5 CM
DOP CALCLVOT PEAK VEL VTI: 19.5 CM
E WAVE DECELERATION TIME: 150.16 MSEC
E/A RATIO: 0.98
E/E' RATIO: 12.86 M/S
ECHO LV POSTERIOR WALL: 1.1 CM (ref 0.6–1.1)
EOSINOPHIL # BLD AUTO: 0.4 K/UL (ref 0–0.5)
EOSINOPHIL NFR BLD: 4.9 % (ref 0–8)
ERYTHROCYTE [DISTWIDTH] IN BLOOD BY AUTOMATED COUNT: 15.1 % (ref 11.5–14.5)
EST. GFR  (NO RACE VARIABLE): >60 ML/MIN/1.73 M^2
FRACTIONAL SHORTENING: 27.5 % (ref 28–44)
GLUCOSE SERPL-MCNC: 74 MG/DL (ref 70–110)
GLUCOSE UR QL STRIP: NEGATIVE
GLUCOSE UR QL STRIP: NEGATIVE
HCT VFR BLD AUTO: 46 % (ref 40–54)
HGB BLD-MCNC: 15.2 G/DL (ref 14–18)
HGB UR QL STRIP: NEGATIVE
HGB UR QL STRIP: NEGATIVE
HYALINE CASTS #/AREA URNS LPF: 0 /LPF
IMM GRANULOCYTES # BLD AUTO: 0.03 K/UL (ref 0–0.04)
IMM GRANULOCYTES NFR BLD AUTO: 0.4 % (ref 0–0.5)
INR PPP: 1.2 (ref 0.8–1.2)
INTERVENTRICULAR SEPTUM: 0.8 CM (ref 0.6–1.1)
KETONES UR QL STRIP: NEGATIVE
KETONES UR QL STRIP: NEGATIVE
LA MINOR: 1.75 CM
LEFT ATRIUM AREA SYSTOLIC (APICAL 2 CHAMBER): 17.68 CM2
LEFT ATRIUM AREA SYSTOLIC (APICAL 4 CHAMBER): 12.4 CM2
LEFT ATRIUM SIZE: 3.07 CM
LEFT ATRIUM VOLUME INDEX MOD: 19.3 ML/M2
LEFT ATRIUM VOLUME MOD: 36.8 ML
LEFT INTERNAL DIMENSION IN SYSTOLE: 2.9 CM (ref 2.1–4)
LEFT VENTRICLE DIASTOLIC VOLUME INDEX: 36.8 ML/M2
LEFT VENTRICLE DIASTOLIC VOLUME: 70.29 ML
LEFT VENTRICLE END DIASTOLIC VOLUME APICAL 2 CHAMBER: 44.24 ML
LEFT VENTRICLE END DIASTOLIC VOLUME APICAL 4 CHAMBER: 44.46 ML
LEFT VENTRICLE END SYSTOLIC VOLUME APICAL 2 CHAMBER: 50.06 ML
LEFT VENTRICLE END SYSTOLIC VOLUME APICAL 4 CHAMBER: 26.36 ML
LEFT VENTRICLE MASS INDEX: 61.9 G/M2
LEFT VENTRICLE SYSTOLIC VOLUME INDEX: 16.1 ML/M2
LEFT VENTRICLE SYSTOLIC VOLUME: 30.8 ML
LEFT VENTRICULAR INTERNAL DIMENSION IN DIASTOLE: 4 CM (ref 3.5–6)
LEFT VENTRICULAR MASS: 118.2 G
LEUKOCYTE ESTERASE UR QL STRIP: NEGATIVE
LEUKOCYTE ESTERASE UR QL STRIP: NEGATIVE
LV LATERAL E/E' RATIO: 11.25 M/S
LV SEPTAL E/E' RATIO: 15 M/S
LVED V (TEICH): 70.29 ML
LVES V (TEICH): 30.8 ML
LVOT MG: 1.97 MMHG
LVOT MV: 0.68 CM/S
LYMPHOCYTES # BLD AUTO: 2.3 K/UL (ref 1–4.8)
LYMPHOCYTES NFR BLD: 26.6 % (ref 18–48)
MAGNESIUM SERPL-MCNC: 1.8 MG/DL (ref 1.6–2.6)
MCH RBC QN AUTO: 29.1 PG (ref 27–31)
MCHC RBC AUTO-ENTMCNC: 33 G/DL (ref 32–36)
MCV RBC AUTO: 88 FL (ref 82–98)
MICROSCOPIC COMMENT: NORMAL
MONOCYTES # BLD AUTO: 0.9 K/UL (ref 0.3–1)
MONOCYTES NFR BLD: 10 % (ref 4–15)
MV PEAK A VEL: 0.92 M/S
MV PEAK E VEL: 0.9 M/S
MV PEAK GRADIENT: 3 MMHG
MV STENOSIS PRESSURE HALF TIME: 43.55 MS
MV VALVE AREA BY CONTINUITY EQUATION: 2.31 CM2
MV VALVE AREA P 1/2 METHOD: 5.05 CM2
NEUTROPHILS # BLD AUTO: 4.9 K/UL (ref 1.8–7.7)
NEUTROPHILS NFR BLD: 57.3 % (ref 38–73)
NITRITE UR QL STRIP: NEGATIVE
NITRITE UR QL STRIP: NEGATIVE
NRBC BLD-RTO: 0 /100 WBC
OHS CV RV/LV RATIO: 0.85 CM
OHS LV EJECTION FRACTION SIMPSONS BIPLANE MOD: 64 %
PH UR STRIP: 7 [PH] (ref 5–8)
PH UR STRIP: 7 [PH] (ref 5–8)
PHOSPHATE SERPL-MCNC: 3.3 MG/DL (ref 2.7–4.5)
PISA TR MAX VEL: 1.89 M/S
PLATELET # BLD AUTO: 168 K/UL (ref 150–450)
PMV BLD AUTO: 12.8 FL (ref 9.2–12.9)
POTASSIUM SERPL-SCNC: 4.1 MMOL/L (ref 3.5–5.1)
PROT SERPL-MCNC: 7.6 G/DL (ref 6–8.4)
PROT UR QL STRIP: ABNORMAL
PROT UR QL STRIP: ABNORMAL
PROTHROMBIN TIME: 12.9 SEC (ref 9–12.5)
PULM VEIN S/D RATIO: 1.34
PV PEAK D VEL: 0.32 M/S
PV PEAK GRADIENT: 4 MMHG
PV PEAK S VEL: 0.43 M/S
PV PEAK VELOCITY: 0.97 M/S
RA PRESSURE ESTIMATED: 3 MMHG
RA VOL SYS: 30.15 ML
RBC # BLD AUTO: 5.22 M/UL (ref 4.6–6.2)
RBC #/AREA URNS HPF: 1 /HPF (ref 0–4)
RIGHT ATRIAL AREA: 12.6 CM2
RIGHT ATRIUM VOLUME AREA LENGTH APICAL 4 CHAMBER: 28.7 ML
RIGHT VENTRICLE DIASTOLIC BASEL DIMENSION: 3.4 CM
RV TB RVSP: 5 MMHG
RV TISSUE DOPPLER FREE WALL SYSTOLIC VELOCITY 1 (APICAL 4 CHAMBER VIEW): 11.42 CM/S
SINUS: 2.59 CM
SODIUM SERPL-SCNC: 138 MMOL/L (ref 136–145)
SP GR UR STRIP: >1.03 (ref 1–1.03)
SP GR UR STRIP: >1.03 (ref 1–1.03)
TDI LATERAL: 0.08 M/S
TDI SEPTAL: 0.06 M/S
TDI: 0.07 M/S
TR MAX PG: 14 MMHG
TRICUSPID ANNULAR PLANE SYSTOLIC EXCURSION: 2.3 CM
TV REST PULMONARY ARTERY PRESSURE: 17 MMHG
URN SPEC COLLECT METH UR: ABNORMAL
URN SPEC COLLECT METH UR: ABNORMAL
UROBILINOGEN UR STRIP-ACNC: NEGATIVE EU/DL
UROBILINOGEN UR STRIP-ACNC: NEGATIVE EU/DL
WBC # BLD AUTO: 8.49 K/UL (ref 3.9–12.7)
WBC #/AREA URNS HPF: 0 /HPF (ref 0–5)
Z-SCORE OF LEFT VENTRICULAR DIMENSION IN END DIASTOLE: -2.95
Z-SCORE OF LEFT VENTRICULAR DIMENSION IN END SYSTOLE: -1.05

## 2024-10-15 PROCEDURE — G0378 HOSPITAL OBSERVATION PER HR: HCPCS

## 2024-10-15 PROCEDURE — 96365 THER/PROPH/DIAG IV INF INIT: CPT

## 2024-10-15 PROCEDURE — 85610 PROTHROMBIN TIME: CPT

## 2024-10-15 PROCEDURE — S4991 NICOTINE PATCH NONLEGEND: HCPCS

## 2024-10-15 PROCEDURE — 36415 COLL VENOUS BLD VENIPUNCTURE: CPT

## 2024-10-15 PROCEDURE — 85025 COMPLETE CBC W/AUTO DIFF WBC: CPT

## 2024-10-15 PROCEDURE — 97530 THERAPEUTIC ACTIVITIES: CPT

## 2024-10-15 PROCEDURE — 97535 SELF CARE MNGMENT TRAINING: CPT

## 2024-10-15 PROCEDURE — 25000003 PHARM REV CODE 250

## 2024-10-15 PROCEDURE — 80053 COMPREHEN METABOLIC PANEL: CPT

## 2024-10-15 PROCEDURE — 63600175 PHARM REV CODE 636 W HCPCS

## 2024-10-15 PROCEDURE — 97116 GAIT TRAINING THERAPY: CPT

## 2024-10-15 PROCEDURE — 97165 OT EVAL LOW COMPLEX 30 MIN: CPT

## 2024-10-15 PROCEDURE — 25000242 PHARM REV CODE 250 ALT 637 W/ HCPCS

## 2024-10-15 PROCEDURE — 83735 ASSAY OF MAGNESIUM: CPT

## 2024-10-15 PROCEDURE — 84100 ASSAY OF PHOSPHORUS: CPT

## 2024-10-15 PROCEDURE — 96366 THER/PROPH/DIAG IV INF ADDON: CPT

## 2024-10-15 PROCEDURE — 99407 BEHAV CHNG SMOKING > 10 MIN: CPT | Mod: S$GLB,,,

## 2024-10-15 PROCEDURE — 85730 THROMBOPLASTIN TIME PARTIAL: CPT

## 2024-10-15 PROCEDURE — 94660 CPAP INITIATION&MGMT: CPT

## 2024-10-15 PROCEDURE — 99900035 HC TECH TIME PER 15 MIN (STAT)

## 2024-10-15 PROCEDURE — 92610 EVALUATE SWALLOWING FUNCTION: CPT

## 2024-10-15 PROCEDURE — 27000190 HC CPAP FULL FACE MASK W/VALVE

## 2024-10-15 PROCEDURE — 97162 PT EVAL MOD COMPLEX 30 MIN: CPT

## 2024-10-15 PROCEDURE — 99204 OFFICE O/P NEW MOD 45 MIN: CPT | Mod: ,,, | Performed by: OTOLARYNGOLOGY

## 2024-10-15 RX ORDER — ATORVASTATIN CALCIUM 80 MG/1
80 TABLET, FILM COATED ORAL DAILY
Qty: 90 TABLET | Refills: 3 | Status: SHIPPED | OUTPATIENT
Start: 2024-10-16 | End: 2025-10-16

## 2024-10-15 RX ORDER — CLOPIDOGREL BISULFATE 75 MG/1
300 TABLET ORAL ONCE
Status: COMPLETED | OUTPATIENT
Start: 2024-10-15 | End: 2024-10-15

## 2024-10-15 RX ORDER — IBUPROFEN 200 MG
1 TABLET ORAL DAILY
Qty: 28 PATCH | Refills: 0 | Status: SHIPPED | OUTPATIENT
Start: 2024-10-16 | End: 2024-11-15

## 2024-10-15 RX ORDER — MAGNESIUM SULFATE HEPTAHYDRATE 40 MG/ML
2 INJECTION, SOLUTION INTRAVENOUS ONCE
Status: COMPLETED | OUTPATIENT
Start: 2024-10-15 | End: 2024-10-15

## 2024-10-15 RX ORDER — IBUPROFEN 200 MG
1 TABLET ORAL DAILY
Status: DISCONTINUED | OUTPATIENT
Start: 2024-10-15 | End: 2024-10-15 | Stop reason: HOSPADM

## 2024-10-15 RX ORDER — ASPIRIN 325 MG
325 TABLET ORAL ONCE
Status: COMPLETED | OUTPATIENT
Start: 2024-10-15 | End: 2024-10-15

## 2024-10-15 RX ORDER — ASPIRIN 81 MG/1
81 TABLET ORAL DAILY
Qty: 21 TABLET | Refills: 0 | Status: SHIPPED | OUTPATIENT
Start: 2024-10-16 | End: 2024-11-06

## 2024-10-15 RX ORDER — CLOPIDOGREL BISULFATE 75 MG/1
75 TABLET ORAL DAILY
Qty: 30 TABLET | Refills: 11 | Status: SHIPPED | OUTPATIENT
Start: 2024-10-16 | End: 2025-10-16

## 2024-10-15 RX ORDER — CLOPIDOGREL BISULFATE 75 MG/1
300 TABLET ORAL ONCE
Status: DISCONTINUED | OUTPATIENT
Start: 2024-10-15 | End: 2024-10-15

## 2024-10-15 RX ORDER — CLOPIDOGREL BISULFATE 75 MG/1
75 TABLET ORAL DAILY
Status: DISCONTINUED | OUTPATIENT
Start: 2024-10-16 | End: 2024-10-15 | Stop reason: HOSPADM

## 2024-10-15 RX ADMIN — FLUTICASONE PROPIONATE 50 MCG: 50 SPRAY, METERED NASAL at 01:10

## 2024-10-15 RX ADMIN — NICOTINE 1 PATCH: 21 PATCH, EXTENDED RELEASE TRANSDERMAL at 01:10

## 2024-10-15 RX ADMIN — MAGNESIUM SULFATE HEPTAHYDRATE 2 G: 40 INJECTION, SOLUTION INTRAVENOUS at 06:10

## 2024-10-15 RX ADMIN — CLOPIDOGREL BISULFATE 300 MG: 75 TABLET ORAL at 01:10

## 2024-10-15 RX ADMIN — ASPIRIN 325 MG ORAL TABLET 325 MG: 325 PILL ORAL at 01:10

## 2024-10-15 NOTE — PLAN OF CARE
FRANCOISE met with pt and pt's wife at bedside this AM to complete DCA. Pt lives at home with his wife and will have her help transport him home at time of d/c. Pt does not use any HME and is fully independent in his ADL's. Pt is not current with any HD or HH agencies. FRANCOISE requested f/u appts for pt. White board updated with CM name and contact information.  Discharge brochure provided.  Pt encouraged to call with any questions or concerns.  Cm will continue to follow pt through transitions of care and assist with any discharge needs.    ANA Mcpherson  [phone]    Future Appointments   Date Time Provider Department Center   11/25/2024  1:00 PM Bucky Michaels MD [facility]        10/15/24 1201   Discharge Planning   Assessment Type Discharge Planning Brief Assessment   Support Systems Spouse/significant other   Equipment Currently Used at Home none   Current Living Arrangements home   Care Facility Name home   Patient/Family Anticipates Transition to home   Patient/Family Anticipated Services at Transition none   DME Needed Upon Discharge  none   Discharge Plan A Home with family   Health Literacy   How often do you need to have someone help you when you read instructions, pamphlets, or other written material from your doctor or pharmacy? Often

## 2024-10-15 NOTE — PROGRESS NOTES
Individual Follow-Up Form    10/15/2024    Quit Date: To be determined    Clinical Status of Patient: Inpatient    Length of Service: 30 minutes    Comments: Smoking cessation education note: Pt is a 0.5 to 0.9 pk/day cigarette smoker x 47 yrs. Order obtained for 21 mg nicotine patch Q day. Pt states ready to quit smoking. Ambulatory referral to Smoking Cessation clinic following hospital discharge.    Diagnosis: F17.210

## 2024-10-15 NOTE — HPI
Donnie Landon is a 64 y.o. male with PMHx of HTN, HLD, JARAD who presented to the ED with 1-2 weeks of LUE numbness tingling and 3 days of L facial droop, dysarthria, and dysphagia. CT head shows possible sequela of remote R thalamic infarcts and chronic microvascular changes. On work-up for stroke, incidental left parotid mass was noted on CTA head/neck.  ENT consulted for further evaluation. He denies any pain at the site, no prior facial paresis, facial twitching, or prior skin cancers in the face/scalp.

## 2024-10-15 NOTE — TELEMEDICINE CONSULT
[facility]  Vascular Neurology  Comprehensive Stroke Center  TeleVascular Neurology Interprofessional Consult Note           Consult Information  Inpatient Consult Tele-Vascular Neurology  Consult performed by: Navjot Berry MD  Consult ordered by: Audra Vivas MD          Consulting Provider: SULLY INFANTE   Patient Location:  [facility] EMERGENCY DEPARTMENT     Summary of patient's symptoms:  64 y.o. male with a history of JARAD, HTN, HLD who presented to the ER with left facial droop, slurred speech x 2 days and left arm numbness x2 weeks.  NIHSS 4     /76        Images personally reviewed and interpreted:  Study: CTA Head & Neck and MRI Brain  Study Interpretation: Multifocal areas of acute ischemia involving the right hemisphere, predominantly involving the anterior circulation. No hemorrhage.  Complete occlusion of the right ICA from it's origin with reconstitution at the supraclinoid segment. Intracranial flow is preserved.     Assessment and plan:  63 y/o male with the above risk factors who presents with left arm numbness, dysarthria and left facial droop with initial symptoms onset 2 weeks ago.     Not a candidate for any acute intervention given LKN > 24hrs.     -- Agree with ASA and Plavix load.  Continue DAPT with ASA 81mg daily and Plavix 75mg daily [id number], followed by ASA 81mg monotherapy.  -- No requirement to target permissive HTN. Can target normotensive goal. However, please avoid aggressive lowering of BP.  -- R carotid appears completely occluded. This does not require any urgent intervention. Suspect a chronic occlusion with carotid stump syndrome being the the most plausible etiology.    -- Lipitor 80mg daily.   -- Check Lipid panel, A1c  -- Target LDL<70, A1c<7   -- TTE w/o bubble (If <60 yrs, please add bubble study)  -- PT/OT/SLP       I spent approximately 20 minutes on this encounter. More than half of that time was spent communicating with the  consulting provider and coordinating patient care.       Navjot Berry MD        This encounter was conducted as an interprofessional communication between providers [place] [facility] [place]ist. The interaction was completed over the phone or via secure messaging (electronic medical record - [facility] Secure Chat).     Once this note was completed, a written copy was sent back to the provider via fax or electronic medical record.

## 2024-10-15 NOTE — DISCHARGE SUMMARY
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  No string sign identified.      No intracranial stenosis or occlusion.      No evidence of infarct or enhancing intracranial mass.      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  It appears well encapsulated.  Differential includes benign and malignant parotid tumors.  ENT consultation advised.      This report was flagged in Epic as abnormal.         Electronically signed by: Cj Tian   Date:    10/14/2024   Time:    22:42      MRI Brain Without Contrast   Final Result      Scattered foci of diffusion restriction in the right cerebral hemisphere, suggestive of acute embolic infarctions in the right cerebral hemisphere predominantly within the anterior circulation.  No evidence of hemorrhage.      Absence of the normal flow void within the right ICA.  A more proximal right ICA occlusion is suspected.  CTA of the head and neck is recommended for further evaluation.      Case discussed with Dr. Vivas on 10/14/2024 18:00.         Electronically signed by: Jim Robledo MD   Date:    10/14/2024   Time:    18:03      CT Head Without Contrast   Final Result   Abnormal      This report was flagged in Epic as  abnormal.      1. There are foci of low attenuation within the right corona radiata anterior aspect of the right thalamus suggesting sequela of remote infarcts however no previous exams are available for comparison.  Correlation with current symptomatology is advised, age-indeterminate infarcts not excluded.   2. Sequela of chronic microvascular ischemic change and senescent change.         Electronically signed by: Jem Yu MD   Date:    10/14/2024   Time:    12:06            Brief History of Present Illness      Donnie Landon is a 64 y.o. male with PMHx of HTN, HLD, JARAD who presented to the ED with 1-2 weeks of LUE numbness tingling and 3 days of L facial droop, dysarthria, and dysphagia. CT head shows possible sequela of remote R thalamic infarcts and chronic microvascular changes. Failed bedside swallow evaluation in the ED. Given subacute presentation, pt is not a candidate for thrombolytics at this time. Pt was admitted to [facility] for stroke.     For the full HPI please refer to the History & Physical from this admission.    Hospital Course By Problem with Pertinent Findings     Stroke  Dysarthria  Embolic stroke involving right internal carotid artery  Facial droop as late effect of cerebrovascular accident  - 1-2 weeks of L upper extremities numbness/tingling  - 3 days of L facial droop, slurred speech, and dysphagia  - /89 on presentation, 138/72 when evaluated by IM team  - CT Head showing possible sequela of remote infarcts in the anterior aspect of the right thalamus and chronic microvascular ischemic/senescent change. No previous studies available for correlation  - Not a candidate for TPA given subacute presentation  - Failed bedside swallow in ED  - NIHSS 4; ABCDE2 6  - EKG appears normal with NSR, no ischemic change  - Lipid Panel: Cholesterol 172, HDL 27, , Trig 172  - TSH 0.79  - MRI Brain shows scattered diffusion restriction in R cerebral hemisphere suggestive  of acute embolic infarctions, primarily in anterior circulation. No hemorrhage. Absence of normal flow void within R ICA.  - CTA Head Neck: Occlusion of R ICA. No intracranial stenosis or occlusion. 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  - Echo: LVEF 55-60%, No LV systolic or diastolic dysfunction, Bubble study negative  - ASA 81 mg x21 days  - Plavix load 300 mg followed by 75 mg daily   - Lipitor 80 mg  - SLP  - PT/OT    L parotid gland mass  -  CTA Head Neck showing 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  - Denies pain, prior facial paresis, facial twitching, and previously diagnosed cancers  - ENT recommending FNA biopsy with IR, which can be completed in outpatient setting  - Given pt is anticoagulated due to recent CVA, coordination will be required between PCP/Neurology as to timing of procedure and briefly holding anticoagulation rosemary-procedurally    Primary Hypertension  - /89 on presentation, 138/72 when evaluated by IM team  - Home regimen Losartan 25 mg daily     JARAD  Snoring  - Pt reports using CPAP at home nightly  - Prior sleep study showing severe obstructive sleep apnea  - Continued CPAP QHS while inpatient  - Per sleep study, pt would benefit from referral to Sleep Medicine     Hyperlipidemia  - Home regimen lipitor 20 mg   - Increased to Lipitor 80 mg in setting of subacute stroke      Discharge Medications        Medication List        START taking these medications      aspirin 81 MG EC tablet  Commonly known as: ECOTRIN  Take 1 tablet (81 mg total) by mouth once daily for 21 days  Start taking on: October 16, 2024     clopidogreL 75 mg tablet  Commonly known as: PLAVIX  Take 1 tablet (75 mg total) by mouth once daily.  Start taking on: October 16, 2024     nicotine 21 mg/24 hr  Commonly known as: NICODERM CQ  Place 1 patch onto the skin once daily.  Start taking on: October 16, 2024            CHANGE how you take these medications      atorvastatin 80 MG tablet  Commonly  known as: LIPITOR  Take 1 tablet (80 mg total) by mouth once daily.  Start taking on: October 16, 2024  What changed:   medication strength  how much to take  Another medication with the same name was removed. Continue taking this medication, and follow the directions you see here.     losartan 25 MG tablet  Commonly known as: COZAAR  What changed: Another medication with the same name was removed. Continue taking this medication, and follow the directions you see here.            CONTINUE taking these medications      azelastine 0.05 % ophthalmic solution  Commonly known as: OPTIVAR  Place 1 drop into both eyes 2 (two) times daily.     clobetasoL 0.05 % cream  Commonly known as: TEMOVATE     fluticasone propionate 50 mcg/actuation nasal spray  Commonly known as: FLONASE     gabapentin 100 MG capsule  Commonly known as: NEURONTIN     mupirocin 2 % ointment  Commonly known as: BACTROBAN     NYSTOP powder  Generic drug: nystatin     oxybutynin 10 MG 24 hr tablet  Commonly known as: DITROPAN-XL     prednisoLONE acetate 1 % Drps  Commonly known as: PRED FORTE     triamcinolone acetonide 0.1% 0.1 % cream  Commonly known as: KENALOG  Apply topically 2 (two) times daily.            STOP taking these medications      amoxicillin 500 MG capsule  Commonly known as: AMOXIL     amoxicillin-clavulanate 875-125mg 875-125 mg per tablet  Commonly known as: AUGMENTIN     clotrimazole 1 % cream  Commonly known as: LOTRIMIN     clotrimazole-betamethasone 1-0.05% cream  Commonly known as: LOTRISONE     doxycycline 100 MG capsule  Commonly known as: MONODOX     HYDROcodone-acetaminophen 5-325 mg per tablet  Commonly known as: NORCO     loratadine 10 mg tablet  Commonly known as: CLARITIN     montelukast 10 mg tablet  Commonly known as: SINGULAIR     oxyCODONE-acetaminophen 5-325 mg per tablet  Commonly known as: PERCOCET     terbinafine  mg tablet  Commonly known as: LAMISIL            ASK your doctor about these medications       ketoconazole 2 % cream  Commonly known as: NIZORAL     levocetirizine 5 MG tablet  Commonly known as: XYZAL     neomycin-polymyxin-dexamethasone 3.5 mg/g-10,000 unit/g-0.1 % Oint  Commonly known as: DEXACINE               Where to Get Your Medications        These medications were sent to [facility]  [address], [place] [address]      Hours: Mon-Fri, 8a-5:30p Phone: [phone]   aspirin 81 MG EC tablet  atorvastatin 80 MG tablet  clopidogreL 75 mg tablet  nicotine 21 mg/24 hr         Discharge Information:   Diet:  Regular     Physical Activity:  As tolerated             Instructions:  1. Take all medications as prescribed  2. Keep all follow-up appointments  3. Return to the hospital or call your primary care physicians if any worsening symptoms such as fever, chest pain, shortness of breath, return of symptoms, or any other concerns.    Follow-Up Appointments:  PCP  Vascular Neurology  ENT      Isaiah Villegas MD  [facility] Internal Medicine, [place]

## 2024-10-15 NOTE — PLAN OF CARE
VN reviewed discharge instructions with pt. Using teach back method.  AVS printed and handed to pt by bedside nurse.  Reviewed follow-up appointments, medications, diet, and importance of medication compliance.  Reviewed home care instructions, treatment plan, self-management, and when to seek medical attention. Stroke booklet given to pt. Allowed time for questions.  All questions answered.  Patient verbalized complete understanding of discharge instructions and voices no concerns.     Discharge instructions complete.  Bedside delivery done.  Transport/wheelchair requested.  Bedside nurse notified.

## 2024-10-15 NOTE — ASSESSMENT & PLAN NOTE
Will need FNA biopsy of left parotid mass via IR with image guidance. This can occur as outpatient. Given that he is anticoagulated due to recent CVA this will need to be coordinated with neurology/PCP as to timing being able to stop anticoagulation for the procedure with IR.

## 2024-10-15 NOTE — PLAN OF CARE
VN note: VN completed AVS and attachments and notified bedside nurseIsabel. Will cont to be available and intervene prn.

## 2024-10-15 NOTE — PROGRESS NOTES
"[facility] Hospital Medicine Progress Note    Primary Team: [facility] Hospitalist Team A  Attending Physician: Dr. Khan  Resident: Ortega  Intern: Bakari    Subjective:      Pt seen this AM resting comfortably. NAEON. Denies any new or worsening symptoms since last night. Continues to describe feeling like his airway is not as open as previously but states he is breathing comfortably. Pt is hungry and requesting a diet. Discussed need to evaluate his swallowing prior to eating.      Objective:     Last 24 Hour Vital Signs:  BP  Min: 122/75  Max: 190/89  Temp  Av.1 °F (36.7 °C)  Min: 97.5 °F (36.4 °C)  Max: 98.6 °F (37 °C)  Pulse  Av.1  Min: 59  Max: 90  Resp  Av.7  Min: 14  Max: 20  SpO2  Av.9 %  Min: 96 %  Max: 100 %  Height  Av' 4" (162.6 cm)  Min: 5' 4" (162.6 cm)  Max: 5' 4" (162.6 cm)  Weight  Av.9 kg (191 lb 10.1 oz)  Min: 86.2 kg (190 lb)  Max: 88.4 kg (194 lb 14.2 oz)  No intake/output data recorded.    Physical Examination:  Gen: AAOx3, NAD  HEENT: head normocephalic, atraumatic  Cardiac: regular rate and rhythm; no murmurs, rubs, or gallops  Resp: clear to auscultation bilaterally, normal work of breathing  GI: abdomen soft, non-tender, non-distended; normoactive bowel sounds  Extrem: no clubbing or swelling noted  Skin: no rashes or bruises noted  Neuro: AAOx3, moving all extremities without difficulty, L facial droop, equivocal LUE pronator drift       Laboratory:  Laboratory Data Reviewed: yes  Pertinent Findings:    Microbiology Data Reviewed: yes  Pertinent Findings:        Other Results:  EKG (my interpretation): normal EKG, normal sinus rhythm, there are no previous tracings available for comparison, normal sinus rhythm.     Radiology Data Reviewed: yes  Pertinent Findings:  CTA Head and Neck (xpd)   Final Result   Abnormal      Occlusion of the right internal carotid artery with reconstitution of the upper carotid siphon near the ophthalmic division.  No string sign identified.    "   No intracranial stenosis or occlusion.      No evidence of infarct or enhancing intracranial mass.      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  It appears well encapsulated.  Differential includes benign and malignant parotid tumors.  ENT consultation advised.      This report was flagged in Epic as abnormal.         Electronically signed by: Cj Tian   Date:    10/14/2024   Time:    22:42      MRI Brain Without Contrast   Final Result      Scattered foci of diffusion restriction in the right cerebral hemisphere, suggestive of acute embolic infarctions in the right cerebral hemisphere predominantly within the anterior circulation.  No evidence of hemorrhage.      Absence of the normal flow void within the right ICA.  A more proximal right ICA occlusion is suspected.  CTA of the head and neck is recommended for further evaluation.      Case discussed with Dr. Vivas on 10/14/2024 18:00.         Electronically signed by: Jim Robledo MD   Date:    10/14/2024   Time:    18:03      CT Head Without Contrast   Final Result   Abnormal      This report was flagged in Epic as abnormal.      1. There are foci of low attenuation within the right corona radiata anterior aspect of the right thalamus suggesting sequela of remote infarcts however no previous exams are available for comparison.  Correlation with current symptomatology is advised, age-indeterminate infarcts not excluded.   2. Sequela of chronic microvascular ischemic change and senescent change.         Electronically signed by: Jem Yu MD   Date:    10/14/2024   Time:    12:06            Current Medications:     Infusions:       Scheduled:   aspirin  325 mg Oral Once    aspirin  81 mg Oral Daily    atorvastatin  80 mg Oral Daily    clopidogreL  300 mg Oral Once    clopidogreL  75 mg Oral Daily    enoxparin  40 mg Subcutaneous Daily    fluticasone propionate  1 spray Each Nostril Daily    losartan  50 mg Oral Daily         PRN:    Current Facility-Administered Medications:     bisacodyL, 10 mg, Rectal, Daily PRN    influenza, 0.5 mL, Intramuscular, vaccine x 1 dose    sodium chloride 0.9%, 10 mL, Intravenous, PRN        Assessment:     Donnie Landon is a 64 y.o. male with PMHx of HTN, HLD, JARAD who presented to the ED with 1-2 weeks of LUE numbness tingling and 3 days of L facial droop, dysarthria, and dysphagia. CT head shows possible sequela of remote R thalamic infarcts and chronic microvascular changes. Failed bedside swallow evaluation in the ED. Given subacute presentation, pt is not a candidate for thrombolytics at this time. Pt was admitted to [facility] for stroke.       Plan:   Stroke  Facial droop as late effect of cerebrovascular accident  - 1-2 weeks of L upper extremities numbness/tingling  - 3 days of L facial droop, slurred speech, and dysphagia  - /89 on presentation, 138/72 when evaluated by IM team  - CT Head showing possible sequela of remote infarcts in the anterior aspect of the right thalamus and chronic microvascular ischemic/senescent change. No previous studies available for correlation  - Not a candidate for TPA given subacute presentation  - Failed bedside swallow in ED  - NIHSS 4; ABCDE2 6  - EKG appears normal with NSR, no ischemic change  - Lipid Panel: Cholesterol 172, HDL 27, , Trig 172  - TSH 0.79  Plan:  - MRI Brain  - Echo   - ASA 81 mg and Plavix load 300 mg followed by 75 mg daily  - Lipitor 80 mg  - SLP  - PT/OT     Primary Hypertension  - /89 on presentation, 138/72 when evaluated by IM team  - Home regimen Losartan 25 mg daily  - Increased to Losartan 50 mg daily  - Continue to monitor     JARAD  - Pt reports using CPAP at home nightly  - Prior sleep study showing severe obstructive sleep apnea  - Continue CPAP QHS     Hyperlipidemia  - Home regimen lipitor 20 mg   - Increased to Lipitor 80 mg in setting of subacute stroke     Diet: NPO pending swallow  eval  DVT Ppx: Lovenox   Code status: Full Code     Dispo: Pending completion of workup          Isaiah Villegas MD  [facility] Internal Medicine HO-I    [facility] Medicine Hospitalist Pager numbers:   [facility] Hospitalist Medicine Team A (Erin/Trish): [phone]  [facility] Hospitalist Medicine Team B (David/Kat):  [phone]

## 2024-10-15 NOTE — PROGRESS NOTES
10/14/24 2354   Admission   Initial VN Admission Questions Complete   Communication Issues? None   Shift   Virtual Nurse - Patient Verbalized Approval Of Camera Use   Safety/Activity   Safety Promotion/Fall Prevention assistive device/personal item within reach;Fall Risk reviewed with patient/family;family expresses understanding of fall risk and prevention;instructed to call staff for mobility;medications reviewed;side rails raised x 2

## 2024-10-15 NOTE — PLAN OF CARE
Problem: Adult Inpatient Plan of Care  Goal: Plan of Care Review  Outcome: Progressing  Goal: Patient-Specific Goal (Individualized)  Outcome: Progressing  Goal: Readiness for Transition of Care  Outcome: Progressing     Problem: Stroke, Ischemic (Includes Transient Ischemic Attack)  Goal: Optimal Coping  Outcome: Progressing  Goal: Optimal Cognitive Function  Outcome: Progressing  Goal: Improved Communication Skills  Outcome: Progressing  Goal: Optimal Functional Ability  Outcome: Progressing  Goal: Effective Oxygenation and Ventilation  Outcome: Progressing  Goal: Effective Urinary Elimination  Outcome: Progressing     Problem: Infection  Goal: Absence of Infection Signs and Symptoms  Outcome: Progressing     Problem: Fall Injury Risk  Goal: Absence of Fall and Fall-Related Injury  Outcome: Progressing     Problem: Comorbidity Management  Goal: Blood Pressure in Desired Range  Outcome: Progressing

## 2024-10-15 NOTE — PLAN OF CARE
Patient seen for physical therapy evaluation on this date.  Patient with no significant physical therapy deficits on evaluation.  Noted to have decreased coordination and mild strength deficits on L hand with continued L facial droop.  Patient ambulated with no AD on level surfaces at independent level.  Single leg stance and tandem ambulation WNL.  No further PT indicated.        Problem: Physical Therapy  Goal: Physical Therapy Goal  Outcome: Adequate for Care Transition

## 2024-10-15 NOTE — PLAN OF CARE
Problem: SLP  Goal: SLP Goal  Description: Short Term Goals:  1. Pt will participate in swallow eval to determine safest diet level.  2. Pt will tolerate reg/thin liquid diet with no audible dysphagia signs.   Outcome: Progressing   Pt safe for upgrade to regular diet/thin liquids, pt presents with mild dysarthria this date. Pt does have oral motor weakness on L side. ENT consulted for L parotid glad on incidental finding.

## 2024-10-15 NOTE — CONSULTS
[facility]  Otorhinolaryngology-Head & Neck Surgery  Consult Note    Patient Name: Donnie Landon  MRN: [id number]  Code Status: Full Code  Admission Date: 10/14/2024  Hospital Length of Stay: 0 days  Attending Physician: Verito Khan MD  Primary Care Provider: Ajith Sanabria MD    Patient information was obtained from patient and ER records.     Consults  Subjective:     Reason for consult: incidental left parotid mass     History of Present Illness: Donnie Landon is a 64 y.o. male with PMHx of HTN, HLD, JARAD who presented to the ED with 1-2 weeks of LUE numbness tingling and 3 days of L facial droop, dysarthria, and dysphagia. CT head shows possible sequela of remote R thalamic infarcts and chronic microvascular changes. On work-up for stroke, incidental left parotid mass was noted on CTA head/neck.  ENT consulted for further evaluation. He denies any pain at the site, no prior facial paresis, facial twitching, or prior skin cancers in the face/scalp.     Medications:  Continuous Infusions:  Scheduled Meds:   aspirin  81 mg Oral Daily    atorvastatin  80 mg Oral Daily    clopidogreL  300 mg Oral Once    [START ON 10/16/2024] clopidogreL  75 mg Oral Daily    enoxparin  40 mg Subcutaneous Daily    fluticasone propionate  1 spray Each Nostril Daily    nicotine  1 patch Transdermal Daily     PRN Meds:  Current Facility-Administered Medications:     bisacodyL, 10 mg, Rectal, Daily PRN    influenza, 0.5 mL, Intramuscular, vaccine x 1 dose    sodium chloride 0.9%, 10 mL, Intravenous, PRN     No current facility-administered medications on file prior to encounter.     Current Outpatient Medications on File Prior to Encounter   Medication Sig    atorvastatin (LIPITOR) 20 MG tablet Take 20 mg by mouth once daily.    atorvastatin (LIPITOR) 40 MG tablet Take 1 tablet (40 mg total) by mouth once daily.    clobetasoL (TEMOVATE) 0.05 % cream Apply topically as needed.    doxycycline (MONODOX) 100 MG capsule Take 100 mg  by mouth once daily.    fluticasone propionate (FLONASE) 50 mcg/actuation nasal spray 1 spray by Each Nostril route.    gabapentin (NEURONTIN) 100 MG capsule Take 100 mg by mouth once daily.    losartan (COZAAR) 25 MG tablet Take 25 mg by mouth once daily.    losartan (COZAAR) 50 MG tablet Take 50 mg by mouth.    montelukast (SINGULAIR) 10 mg tablet Take 10 mg by mouth once daily.    oxybutynin (DITROPAN-XL) 10 MG 24 hr tablet Take 10 mg by mouth once daily.    amoxicillin (AMOXIL) 500 MG capsule TAKE 1 CAPSULE BY MOUTH EVERY 6 HOURS FOR 7 DAYS (Patient not taking: Reported on 10/14/2024)    amoxicillin-clavulanate 875-125mg (AUGMENTIN) 875-125 mg per tablet Take 1 tablet by mouth 2 (two) times daily. (Patient not taking: Reported on 10/14/2024)    azelastine (OPTIVAR) 0.05 % ophthalmic solution Place 1 drop into both eyes 2 (two) times daily. (Patient not taking: Reported on 10/14/2024)    clotrimazole (LOTRIMIN) 1 % cream APPLY CREAM TOPICALLY TO AFFECTED AREA TWICE DAILY OF FEET FOR TWO WEEKS AS NEEDED (Patient not taking: Reported on 10/14/2024)    clotrimazole-betamethasone 1-0.05% (LOTRISONE) cream APPLY TO THE AFFECTED AND SURROUNDING AREAS OF SKIN BY TOPICAL ROUTE 2 TIMES PER DAY IN THE MORNING AND EVENING FOR 2 WEEKS (Patient not taking: Reported on 10/14/2024)    HYDROcodone-acetaminophen (NORCO) 5-325 mg per tablet Take 1 tablet by mouth every 6 (six) hours as needed for Pain. (Patient not taking: Reported on 10/14/2024)    ketoconazole (NIZORAL) 2 % cream APPLY CREAM TOPICALLY TO AFFECTED AREA ONCE DAILY BETWEEN THE TOES (Patient not taking: Reported on 10/14/2024)    levocetirizine (XYZAL) 5 MG tablet Take 1 tablet every day by oral route at bedtime. (Patient not taking: Reported on 10/14/2024)    loratadine (CLARITIN) 10 mg tablet Take 10 mg by mouth. (Patient not taking: Reported on 10/14/2024)    mupirocin (BACTROBAN) 2 % ointment APPLY TO NAIL BED TWICE DAILY FOR ONE MONTH REPEAT AS NEEDED     neomycin-polymyxin-dexamethasone (DEXACINE) 3.5 mg/g-10,000 unit/g-0.1 % Oint APPLY OINTMENT INTO LEFT EYE AT BEDTIME (Patient not taking: Reported on 10/14/2024)    nystatin (NYSTOP) powder APPLY A THIN LAYER OF POWDER TOPICALLY TO AFFECTED AREA TWICE DAILY FOR 30 DAYS FOR TINEA PEDIS AS A MAINTENANCE THERAPY.    oxyCODONE-acetaminophen (PERCOCET) 5-325 mg per tablet  (Patient not taking: Reported on 10/14/2024)    prednisoLONE acetate (PRED FORTE) 1 % DrpS Place 1 drop into both eyes 3 (three) times daily.    terbinafine HCL (LAMISIL) 250 mg tablet TAKE 1 TABLET BY MOUTH ONCE DAILY FOR TOENAILS (Patient not taking: Reported on 10/14/2024)    triamcinolone acetonide 0.1% (KENALOG) 0.1 % cream Apply topically 2 (two) times daily.       Review of patient's allergies indicates:  No Known Allergies    Past Medical History:   Diagnosis Date    No known health problems      Past Surgical History:   Procedure Laterality Date    COLONOSCOPY  07/29/2022    COLONOSCOPY N/A 7/29/2022    Procedure: COLONOSCOPY;  Surgeon: Cj Reddy MD;  [facility];  Service: Endoscopy;  Laterality: N/A;    CYST REMOVAL N/A 12/22/2023    Procedure: EXCISION, CYST Perineum;  Surgeon: Donnie Andrade MD;  Location: [facility] OR;  Service: General;  Laterality: N/A;    NO PAST SURGERIES      SOFT TISSUE BIOPSY  07/06/2022    SOFT TISSUE BIOPSY N/A 07/06/2022    Procedure: BIOPSY, SOFT TISSUE, left  back, right back, mid back R flank 5cm;  Surgeon: Jb Bell MD;  Location: [facility] OR;  Service: General;  Laterality: N/A;     Family History       Problem Relation (Age of Onset)    Diabetes Mellitus Father    Heart disease Father    Hypertension Father          Tobacco Use    Smoking status: Every Day     Current packs/day: 0.80     Average packs/day: 0.8 packs/day for 46.8 years (37.4 ttl pk-yrs)     Types: Cigarettes     Start date: 1978    Smokeless tobacco: Never    Tobacco comments:     Enrolled in the [place] [employer]  on 7/1/22 (SCT Member ID # [id number]). Pt is a 0.5 to 0.9 pk/day cigarette smoker x 47 yrs. Pt states ready to quit smoking. Ambulatory referral to Smoking Cessation clinic following hospital discharge.   Substance and Sexual Activity    Alcohol use: Never    Drug use: Never    Sexual activity: Not on file     Review of Systems  Objective:     Vital Signs (Most Recent):  Temp: 97.3 °F (36.3 °C) (10/15/24 1125)  Pulse: 73 (10/15/24 1148)  Resp: 17 (10/15/24 1125)  BP: (!) 158/82 (10/15/24 1125)  SpO2: 97 % (10/15/24 1127) Vital Signs (24h Range):  Temp:  [97.3 °F (36.3 °C)-98.6 °F (37 °C)] 97.3 °F (36.3 °C)  Pulse:  [59-77] 73  Resp:  [16-20] 17  SpO2:  [96 %-100 %] 97 %  BP: (122-160)/(71-82) 158/82     Weight: 88.4 kg (194 lb 14.2 oz)  Body mass index is 33.45 kg/m².         Physical Exam     Constitutional: Sitting up in bed, communicating well.  NAD.  Eyes: EOM I Bilaterally  Head/Face: Normocephalic.  Negative paranasal sinus pressure/tenderness.  Salivary glands WNL.  House Brackmann I on right; HB I in frontal region normal movement but HB II in midface and lower face    Right Ear: External Auditory Canal WNL,TM w/o masses/lesions/perforations.  Auricle WNL.  Left Ear: External Auditory Canal WNL,TM w/o masses/lesions/perforations. Auricle WNL.  Nose: No gross nasal septal deviation. Inferior Turbinates 3+ bilaterally. No septal perforation. No masses/lesions. External nasal skin without masses/lesions.  Oral Cavity: Gingiva/lips WNL.  FOM Soft, no masses palpated. Oral Tongue mobile. Hard Palate WNL.   Oropharynx: BOT WNL. No masses/lesions noted. Tonsillar fossa/pharyngeal wall without lesions. Posterior oropharynx WNL.  Soft palate without masses. Midline uvula.   Neck/Lymphatic: No LAD I-VI bilaterally.  No thyromegaly.  No palpable distinct palpable masses noted on exam. Generalized fullness noted of left parotid gland.    Mirror laryngoscopy/nasopharyngoscopy: Active gag reflex.  Unable to  perform.    Significant Labs:  CBC:   Recent Labs   Lab 10/15/24  0330   WBC 8.49   RBC 5.22   HGB 15.2   HCT 46.0      MCV 88   MCH 29.1   MCHC 33.0     CMP:   Recent Labs   Lab 10/15/24  0330   GLU 74   CALCIUM 9.5   ALBUMIN 3.7   PROT 7.6      K 4.1   CO2 19*      BUN 16   CREATININE 1.0   ALKPHOS 131   ALT 29   AST 23   BILITOT 0.4     Coagulation:   Recent Labs   Lab 10/15/24  0330   LABPROT 12.9*   INR 1.2   APTT 35.4*       Significant Diagnostics:  CTA head and neck 10/14/2024: images interpreted by me and discussed with patient and his wife at bedside.   Impression:     Occlusion of the right internal carotid artery with reconstitution of the upper carotid siphon near the ophthalmic division.  No string sign identified.     No intracranial stenosis or occlusion.     No evidence of infarct or enhancing intracranial mass.     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  It appears well encapsulated.  Differential includes benign and malignant parotid tumors.  ENT consultation advised.      Assessment/Plan:     Parotid mass  Will need FNA biopsy of left parotid mass via IR with image guidance. This can occur as outpatient. Given that he is anticoagulated due to recent CVA this will need to be coordinated with neurology/PCP as to timing being able to stop anticoagulation for the procedure with IR.       VTE Risk Mitigation (From admission, onward)           Ordered     enoxaparin injection 40 mg  Daily         10/14/24 1912     IP VTE HIGH RISK PATIENT  Once         10/14/24 1325     Place sequential compression device  Until discontinued         10/14/24 1325                    Thank you for your consult.     Candis Hassan MD  Otorhinolaryngology-Head & Neck Surgery  [facility]

## 2024-10-15 NOTE — PLAN OF CARE
OT evaluation completed. Patient with PLOF of independence in ADL/ IADL/mobility and with hx of no reported falls in the past 3 months. On evaluation this date patient able to mobilize without device with distant supervision progress to independence including in hallway dual tasking and perform ADLs grossly at supervision/independence, with slight  LUE coordination/sensation deficits noted and presentation/reports of L sided facial droop/difficulty swallowing/and dysarthria. Skilled acute OT to follow. Recommend low level intensity - OP OT referral  (and defer any post acute SLP recs to acute care SLP). No DME recommendations.        Problem: Occupational Therapy  Goal: Occupational Therapy Goal  Description: Goals to be met by: 11/5/2024     Patient will increase functional independence with ADLs by performing:    Left Upper extremity exercise program x12 reps per handout, with independence and reciprocation for neuroplasticity to promote improved coordinated use of LUE into daily/instrumental living.  100% reciprocation for patient education, CVA/BEFAST education, ADL/IADL/Mobility modifications as appropriate to support safe d/c to least restrictive environment  Dressing regimen including retrieval in a timely manner with independence.       Outcome: Progressing

## 2024-10-15 NOTE — PT/OT/SLP EVAL
Speech Language Pathology Evaluation  Bedside Swallow    Patient Name:  Donnie Landon   MRN:  [id number]  Admitting Diagnosis: Stroke    Recommendations:                 General Recommendations:  outpatient speech therapy   Diet recommendations:  Regular Diet - IDDSI Level 7, Thin liquids - IDDSI Level 0   Aspiration Precautions: standard precautions, whole meds   General Precautions: Standard, fall  Communication strategies:  primary language is Lithuanian but can speak English    Assessment:     Donnie Landon is a 64 y.o. male admitted with stroke who presented with mild dysarthria and mild oral dysphagia. .      History per MD       Cerebrovascular Accident (Patient reports to the ED complaining of left sided facial droop, left eye blurriness, and slurred speech started x2 days ago. Left arm numbness x2 weeks ago. Patient ambulatory to triage, NAD noted. Surekha Singh PA-C in triage, assessing. VAN negative.)        Subjective:        Past Medical History:   Diagnosis Date    No known health problems        Past Surgical History:   Procedure Laterality Date    COLONOSCOPY  07/29/2022    COLONOSCOPY N/A 7/29/2022    Procedure: COLONOSCOPY;  Surgeon: Cj Reddy MD;  Location: [facility];  Service: Endoscopy;  Laterality: N/A;    CYST REMOVAL N/A 12/22/2023    Procedure: EXCISION, CYST Perineum;  Surgeon: Donnie Andrade MD;  Location: [facility] OR;  Service: General;  Laterality: N/A;    NO PAST SURGERIES      SOFT TISSUE BIOPSY  07/06/2022    SOFT TISSUE BIOPSY N/A 07/06/2022    Procedure: BIOPSY, SOFT TISSUE, left  back, right back, mid back R flank 5cm;  Surgeon: Jb Bell MD;  Location: [facility] OR;  Service: General;  Laterality: N/A;       Social History: Patient lives with wife at home, indep with ADLS.    Modified Barium Swallow: no hx of dysphagia     CTA: Occlusion of the right internal carotid artery with reconstitution of the upper carotid siphon near the ophthalmic division.  No string sign  "identified.   No intracranial stenosis or occlusion.   No evidence of infarct or enhancing intracranial mass.     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  It appears well encapsulated.  Differential includes benign and malignant parotid tumors.  ENT consultation advised.     CT of the head: 1. There are foci of low attenuation within the right corona radiata anterior aspect of the right thalamus suggesting sequela of remote infarcts however no previous exams are available for comparison.  Correlation with current symptomatology is advised, age-indeterminate infarcts not excluded.     Prior diet: reg/thin, no pork, kosher and vegetarian.    Subjective     Consult received for clinical swallow eval/speech/lang eval this date, SLP did communicate with RN prior to eval/treat.    Patient goals: "I am starving and need some food now."     Pain/Comfort:  Pain Rating 1: 0/10    Respiratory Status: room air     Objective:   Pt seen this date in room.   Pt with wife at bedside. Pt awake and alert.  Pt begging for food.   Pt reported no pain but states chewing on his Left side of jaw feels numb and difficult to chew. (As if I cannot chew).    Speech production: pt presents with L sided facial droop, reduced ROM and reduced buccal/labial retraction and reports tingle and numbess on that side of his face. Pt with mild dysarthria which impacts speech clarity in conversational speech activities.       Oral Musculature Evaluation  Oral Musculature: general weakness, facial asymmetry present, left weakness  Dentition: present and adequate  Secretion Management: adequate  Mucosal Quality: dry  Oral Labial Strength and Mobility: impaired coordination, impaired retraction  Lingual Strength and Mobility: impaired strength  Buccal Strength and Mobility: flaccid  Volitional Cough: elicited  Volitional Swallow: timely swallow  Voice Prior to PO Intake: clear voice    Bedside Swallow Eval: Pt on specific " dietary restrictions including kosher, no pork and prefers vegetarian  Consistencies Assessed:  Thin liquids water by cup and straw approx. 4 oz  Puree applesauce self fed   Solids emeterio cracker moisten       Oral Phase:   WFL    Pharyngeal Phase:   no overt clinical signs/symptoms of aspiration  no overt clinical signs/symptoms of pharyngeal dysphagia    Compensatory Strategies  Multiple swallows    Treatment: SLP provided patient and family education on SLP recommendations, SLP role, s/s and risks of aspiration, safe swallow precautions, and POC. The patient and family v/u of all discussed and are in agreement with POC.       Goals:   Multidisciplinary Problems       SLP Goals          Problem: SLP    Goal Priority Disciplines Outcome   SLP Goal     SLP Progressing   Description: Short Term Goals:  1. Pt will participate in swallow eval to determine safest diet level.  2. Pt will tolerate reg/thin liquid diet with no audible dysphagia signs.                        Plan:     Patient to be seen:  3 x/week   Plan of Care expires:  11/13/24  Plan of Care reviewed with:  patient, spouse   SLP Follow-Up:  Yes       Discharge recommendations:  Low Intensity Therapy (outpatient OT and SLP services)   Barriers to Discharge:  None    Time Tracking:     SLP Treatment Date:   10/15/24  Speech Start Time:  1200  Speech Stop Time:  1221     Speech Total Time (min):  21 min    Billable Minutes: Eval Swallow and Oral Function 11 and Self Care/Home Management Training 10    10/15/2024

## 2024-10-15 NOTE — SUBJECTIVE & OBJECTIVE
Medications:  Continuous Infusions:  Scheduled Meds:   aspirin  81 mg Oral Daily    atorvastatin  80 mg Oral Daily    clopidogreL  300 mg Oral Once    [START ON 10/16/2024] clopidogreL  75 mg Oral Daily    enoxparin  40 mg Subcutaneous Daily    fluticasone propionate  1 spray Each Nostril Daily    nicotine  1 patch Transdermal Daily     PRN Meds:  Current Facility-Administered Medications:     bisacodyL, 10 mg, Rectal, Daily PRN    influenza, 0.5 mL, Intramuscular, vaccine x 1 dose    sodium chloride 0.9%, 10 mL, Intravenous, PRN     No current facility-administered medications on file prior to encounter.     Current Outpatient Medications on File Prior to Encounter   Medication Sig    atorvastatin (LIPITOR) 20 MG tablet Take 20 mg by mouth once daily.    atorvastatin (LIPITOR) 40 MG tablet Take 1 tablet (40 mg total) by mouth once daily.    clobetasoL (TEMOVATE) 0.05 % cream Apply topically as needed.    doxycycline (MONODOX) 100 MG capsule Take 100 mg by mouth once daily.    fluticasone propionate (FLONASE) 50 mcg/actuation nasal spray 1 spray by Each Nostril route.    gabapentin (NEURONTIN) 100 MG capsule Take 100 mg by mouth once daily.    losartan (COZAAR) 25 MG tablet Take 25 mg by mouth once daily.    losartan (COZAAR) 50 MG tablet Take 50 mg by mouth.    montelukast (SINGULAIR) 10 mg tablet Take 10 mg by mouth once daily.    oxybutynin (DITROPAN-XL) 10 MG 24 hr tablet Take 10 mg by mouth once daily.    amoxicillin (AMOXIL) 500 MG capsule TAKE 1 CAPSULE BY MOUTH EVERY 6 HOURS FOR 7 DAYS (Patient not taking: Reported on 10/14/2024)    amoxicillin-clavulanate 875-125mg (AUGMENTIN) 875-125 mg per tablet Take 1 tablet by mouth 2 (two) times daily. (Patient not taking: Reported on 10/14/2024)    azelastine (OPTIVAR) 0.05 % ophthalmic solution Place 1 drop into both eyes 2 (two) times daily. (Patient not taking: Reported on 10/14/2024)    clotrimazole (LOTRIMIN) 1 % cream APPLY CREAM TOPICALLY TO AFFECTED AREA  TWICE DAILY OF FEET FOR TWO WEEKS AS NEEDED (Patient not taking: Reported on 10/14/2024)    clotrimazole-betamethasone 1-0.05% (LOTRISONE) cream APPLY TO THE AFFECTED AND SURROUNDING AREAS OF SKIN BY TOPICAL ROUTE 2 TIMES PER DAY IN THE MORNING AND EVENING FOR 2 WEEKS (Patient not taking: Reported on 10/14/2024)    HYDROcodone-acetaminophen (NORCO) 5-325 mg per tablet Take 1 tablet by mouth every 6 (six) hours as needed for Pain. (Patient not taking: Reported on 10/14/2024)    ketoconazole (NIZORAL) 2 % cream APPLY CREAM TOPICALLY TO AFFECTED AREA ONCE DAILY BETWEEN THE TOES (Patient not taking: Reported on 10/14/2024)    levocetirizine (XYZAL) 5 MG tablet Take 1 tablet every day by oral route at bedtime. (Patient not taking: Reported on 10/14/2024)    loratadine (CLARITIN) 10 mg tablet Take 10 mg by mouth. (Patient not taking: Reported on 10/14/2024)    mupirocin (BACTROBAN) 2 % ointment APPLY TO NAIL BED TWICE DAILY FOR ONE MONTH REPEAT AS NEEDED    neomycin-polymyxin-dexamethasone (DEXACINE) 3.5 mg/g-10,000 unit/g-0.1 % Oint APPLY OINTMENT INTO LEFT EYE AT BEDTIME (Patient not taking: Reported on 10/14/2024)    nystatin (NYSTOP) powder APPLY A THIN LAYER OF POWDER TOPICALLY TO AFFECTED AREA TWICE DAILY FOR 30 DAYS FOR TINEA PEDIS AS A MAINTENANCE THERAPY.    oxyCODONE-acetaminophen (PERCOCET) 5-325 mg per tablet  (Patient not taking: Reported on 10/14/2024)    prednisoLONE acetate (PRED FORTE) 1 % DrpS Place 1 drop into both eyes 3 (three) times daily.    terbinafine HCL (LAMISIL) 250 mg tablet TAKE 1 TABLET BY MOUTH ONCE DAILY FOR TOENAILS (Patient not taking: Reported on 10/14/2024)    triamcinolone acetonide 0.1% (KENALOG) 0.1 % cream Apply topically 2 (two) times daily.       Review of patient's allergies indicates:  No Known Allergies    Past Medical History:   Diagnosis Date    No known health problems      Past Surgical History:   Procedure Laterality Date    COLONOSCOPY  07/29/2022    COLONOSCOPY N/A  7/29/2022    Procedure: COLONOSCOPY;  Surgeon: Cj Reddy MD;  Location: [facility] ENDO;  Service: Endoscopy;  Laterality: N/A;    CYST REMOVAL N/A 12/22/2023    Procedure: EXCISION, CYST Perineum;  Surgeon: Donnie Andrade MD;  Location: [facility] OR;  Service: General;  Laterality: N/A;    NO PAST SURGERIES      SOFT TISSUE BIOPSY  07/06/2022    SOFT TISSUE BIOPSY N/A 07/06/2022    Procedure: BIOPSY, SOFT TISSUE, left  back, right back, mid back R flank 5cm;  Surgeon: Jb Bell MD;  Location: [facility] OR;  Service: General;  Laterality: N/A;     Family History       Problem Relation (Age of Onset)    Diabetes Mellitus Father    Heart disease Father    Hypertension Father          Tobacco Use    Smoking status: Every Day     Current packs/day: 0.80     Average packs/day: 0.8 packs/day for 46.8 years (37.4 ttl pk-yrs)     Types: Cigarettes     Start date: 1978    Smokeless tobacco: Never    Tobacco comments:     Enrolled in the [employer] on 7/1/22 ([facility] Member ID # [id number]). Pt is a 0.5 to 0.9 pk/day cigarette smoker x 47 yrs. Pt states ready to quit smoking. Ambulatory referral to Smoking Cessation clinic following hospital discharge.   Substance and Sexual Activity    Alcohol use: Never    Drug use: Never    Sexual activity: Not on file     Review of Systems  Objective:     Vital Signs (Most Recent):  Temp: 97.3 °F (36.3 °C) (10/15/24 1125)  Pulse: 73 (10/15/24 1148)  Resp: 17 (10/15/24 1125)  BP: (!) 158/82 (10/15/24 1125)  SpO2: 97 % (10/15/24 1127) Vital Signs (24h Range):  Temp:  [97.3 °F (36.3 °C)-98.6 °F (37 °C)] 97.3 °F (36.3 °C)  Pulse:  [59-77] 73  Resp:  [16-20] 17  SpO2:  [96 %-100 %] 97 %  BP: (122-160)/(71-82) 158/82     Weight: 88.4 kg (194 lb 14.2 oz)  Body mass index is 33.45 kg/m².         Physical Exam     Constitutional: Sitting up in bed, communicating well.  NAD.  Eyes: EOM I Bilaterally  Head/Face: Normocephalic.  Negative paranasal sinus pressure/tenderness.  Salivary  glands WNL.  House Brackmann I on right; HB I in frontal region normal movement but HB II in midface and lower face    Right Ear: External Auditory Canal WNL,TM w/o masses/lesions/perforations.  Auricle WNL.  Left Ear: External Auditory Canal WNL,TM w/o masses/lesions/perforations. Auricle WNL.  Nose: No gross nasal septal deviation. Inferior Turbinates 3+ bilaterally. No septal perforation. No masses/lesions. External nasal skin without masses/lesions.  Oral Cavity: Gingiva/lips WNL.  FOM Soft, no masses palpated. Oral Tongue mobile. Hard Palate WNL.   Oropharynx: BOT WNL. No masses/lesions noted. Tonsillar fossa/pharyngeal wall without lesions. Posterior oropharynx WNL.  Soft palate without masses. Midline uvula.   Neck/Lymphatic: No LAD I-VI bilaterally.  No thyromegaly.  No palpable distinct palpable masses noted on exam. Generalized fullness noted of left parotid gland.    Mirror laryngoscopy/nasopharyngoscopy: Active gag reflex.  Unable to perform.    Significant Labs:  CBC:   Recent Labs   Lab 10/15/24  0330   WBC 8.49   RBC 5.22   HGB 15.2   HCT 46.0      MCV 88   MCH 29.1   MCHC 33.0     CMP:   Recent Labs   Lab 10/15/24  0330   GLU 74   CALCIUM 9.5   ALBUMIN 3.7   PROT 7.6      K 4.1   CO2 19*      BUN 16   CREATININE 1.0   ALKPHOS 131   ALT 29   AST 23   BILITOT 0.4     Coagulation:   Recent Labs   Lab 10/15/24  0330   LABPROT 12.9*   INR 1.2   APTT 35.4*       Significant Diagnostics:  CTA head and neck 10/14/2024: images interpreted by me and discussed with patient and his wife at bedside.   Impression:     Occlusion of the right internal carotid artery with reconstitution of the upper carotid siphon near the ophthalmic division.  No string sign identified.     No intracranial stenosis or occlusion.     No evidence of infarct or enhancing intracranial mass.     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  It appears well encapsulated.  Differential includes  benign and malignant parotid tumors.  ENT consultation advised.

## 2024-10-15 NOTE — PT/OT/SLP EVAL
Occupational Therapy   Evaluation and treatment    Name: Donnie Landon  MRN: [id number]  Admitting Diagnosis: Stroke  Recent Surgery: * No surgery found *      Recommendations:     Discharge Recommendations: Low Intensity Therapy (OP OT referral (and defer post acute SLP recommendations to acute care SLP))  Discharge Equipment Recommendations:  none  Barriers to discharge:  Other (Comment) (no acute OT barriers)    Assessment:     Donnie Landon is a 64 y.o. male with a medical diagnosis of Stroke.  He presents with .The primary encounter diagnosis was Dysarthria. Diagnoses of Acute focal neurological deficit, Cerebrovascular accident (CVA), unspecified mechanism, JARAD (obstructive sleep apnea), Other symptoms and signs involving the nervous system, Facial droop, and Paresthesias in left hand were also pertinent to this visit.  . Performance deficits affecting function: weakness, impaired self care skills, impaired sensation, decreased upper extremity function, impaired coordination, impaired fine motor.      OT evaluation completed. On evaluation this date patient able to mobilize without device with distant supervision progress to independence including in hallway dual tasking and perform ADLs grossly at supervision/independence, with slight  LUE coordination/sensation deficits noted and presentation/reports of L sided facial droop/difficulty swallowing/and dysarthria. Skilled acute OT to follow. Recommend low level intensity - OP OT referral (and defer any post acute SLP recs to acute care SLP). No DME recommendations.        Rehab Prognosis: Good; patient would benefit from acute skilled OT services to address these deficits and reach maximum level of function.       Plan:     Patient to be seen 2 x/week to address the above listed problems via self-care/home management, therapeutic activities, neuromuscular re-education, therapeutic exercises  Plan of Care Expires: 11/05/24  Plan of Care Reviewed with: patient,  spouse, friend    Subjective     Chief Complaint: wants to eat but reports difficulty swallowing  Patient/Family Comments/goals: reports feeling pretty good and glad to be able to walk well even though he had a stroke    Occupational Profile:  Living Environment: resides with family in a single story home, few reported steps to enter without a handrail, tub shower  Previous level of function:  Patient with PLOF of independence in ADL/ IADL/mobility and with hx of no reported falls in the past 3 months.  Equipment Used at Home: none  Assistance upon Discharge: family    Pain/Comfort:  Pain Rating 1: 0/10  Pain Addressed 1: Reposition  Pain Rating Post-Intervention 1: 0/10      Objective:     Communicated with: nursing prior to session.  Patient found HOB elevated with telemetry upon OT entry to room.    General Precautions: Standard, fall, NPO  Orthopedic Precautions: N/A  Braces: N/A  Respiratory Status: Room air    Occupational Performance:    Bed Mobility:    Patient completed Supine to Sit with modified independence  Patient completed Sit to Supine with modified independence    Functional Mobility/Transfers:  Patient completed Sit <> Stand Transfer with independence  with  no assistive device and no use of UE   Patient completed Toilet Transfer Step Transfer technique with distant supervision with  and without use of grab bar  Functional Mobility: in room / progressive out of room mobility inclusive of dual tasking, single line walking, and quick position change with supervision progress to independence without avert LOB    Activities of Daily Living:  Upper Body Dressing: set up  Lower Body Dressing: SBA ; min verbal cues for LUE coordination when threading on simulated pants and adjusting socks  Toileting: supervision ; no voiding; true bathroom    Cognitive/Visual Perceptual:  Cognitive/Psychosocial Skills:     -       Oriented to: Person, Place, Time, and Situation   -       Follows Commands/attention:Follows  two-step commands  -       Communication: mild dysarthria occasionally during session  -       Safety awareness/insight to disability: intact   -       Mood/Affect/Coping skills/emotional control: Pleasant  Visual/Perceptual:      -Intact  tracking, acuity, and WFL clock drawing screen inclusive of reading/writing text    Physical Exam:  Sensation:    -       Intact  light/touch mild deficits distal LUE specifically radial aspect dorsum of hand  Dominant hand: -       right  Upper Extremity Range of Motion:     -       Right Upper Extremity: WNL  -       Left Upper Extremity: WNL  Upper Extremity Strength:    -       Right Upper Extremity: WNL  -       Left Upper Extremity: WFL except decreased than R; 4+/5 grossly   Strength:    -       Right Upper Extremity: WFL  -       Left Upper Extremity: WFL  Fine Motor Coordination:    -       Intact  Left hand, finger to nose, Right hand, finger to nose, Left hand thumb/finger opposition skills, Right hand thumb/finger opposition skills,  however in all L sided efforts, requires increased time  -       impaired: Left hand, diadochokinesis skill     AMPAC 6 Click ADL:  AMPAC Total Score: 22    Treatment & Education:  Patient and family educated on role of OT/ POC development.  Occupational profile developed.   Patient guided to transition eob for assessment.   Initiated ADL / functional mobility training as above with initial education / affirmation for neuroplasticity, focused use of LUE (slightly less coordinated/ affected extremity) into efforts.   Affirmation for slower movements and self check ins to prevent any dizziness / adverse response (none noted).  Patient without reported prior knowledge of stroke s/s; educated on BEFAST/ principles with handout provided and further teaching needed.  Educated on recommendation for continued therapy at this time.   Answered inquiries in scope      Patient left HOB elevated with all lines intact, call button in reach, and  nursing notified    GOALS:   Multidisciplinary Problems       Occupational Therapy Goals          Problem: Occupational Therapy    Goal Priority Disciplines Outcome Interventions   Occupational Therapy Goal     OT, PT/OT Progressing    Description: Goals to be met by: 11/5/2024     Patient will increase functional independence with ADLs by performing:    Left Upper extremity exercise program x12 reps per handout, with independence and reciprocation for neuroplasticity to promote improved coordinated use of LUE into daily/instrumental living.  100% reciprocation for patient education, CVA/BEFAST education, ADL/IADL/Mobility modifications as appropriate to support safe d/c to least restrictive environment  Dressing regimen including retrieval in a timely manner with independence.                            History:     Past Medical History:   Diagnosis Date    No known health problems      HTN hyperlipidemia    Past Surgical History:   Procedure Laterality Date    COLONOSCOPY  07/29/2022    COLONOSCOPY N/A 7/29/2022    Procedure: COLONOSCOPY;  Surgeon: Cj Reddy MD;  Location: [facility];  Service: Endoscopy;  Laterality: N/A;    CYST REMOVAL N/A 12/22/2023    Procedure: EXCISION, CYST Perineum;  Surgeon: Donnie Andrade MD;  Location: [facility] OR;  Service: General;  Laterality: N/A;    NO PAST SURGERIES      SOFT TISSUE BIOPSY  07/06/2022    SOFT TISSUE BIOPSY N/A 07/06/2022    Procedure: BIOPSY, SOFT TISSUE, left  back, right back, mid back R flank 5cm;  Surgeon: Jb Bell MD;  Location: [facility] OR;  Service: General;  Laterality: N/A;       Time Tracking:     OT Date of Treatment: 10/15/24  OT Start Time: 0922  OT Stop Time: 0948  OT Total Time (min): 26 min    Billable Minutes:Evaluation 16 min  Therapeutic Activity 10 min    10/15/2024

## 2024-10-15 NOTE — PLAN OF CARE
Problem: Adult Inpatient Plan of Care  Goal: Plan of Care Review  10/15/2024 0523 by Maciej Webster LPN  Outcome: Progressing  10/15/2024 0324 by Maciej Webster LPN  Outcome: Progressing  Goal: Patient-Specific Goal (Individualized)  10/15/2024 0523 by Maciej Webster LPN  Outcome: Progressing  10/15/2024 0324 by Maciej Webster LPN  Outcome: Progressing  Goal: Optimal Comfort and Wellbeing  Outcome: Progressing  Goal: Readiness for Transition of Care  10/15/2024 0523 by Maciej Webster LPN  Outcome: Progressing  10/15/2024 0324 by Maciej Webster LPN  Outcome: Progressing     Problem: Stroke, Ischemic (Includes Transient Ischemic Attack)  Goal: Optimal Coping  10/15/2024 0523 by Maciej Webster LPN  Outcome: Progressing  10/15/2024 0324 by Maciej Webster LPN  Outcome: Progressing  Goal: Optimal Cognitive Function  10/15/2024 0523 by Maciej Webster LPN  Outcome: Progressing  10/15/2024 0324 by Maciej Webster LPN  Outcome: Progressing  Goal: Improved Communication Skills  10/15/2024 0523 by Maciej Webster LPN  Outcome: Progressing  10/15/2024 0324 by Maciej Webster LPN  Outcome: Progressing  Goal: Optimal Functional Ability  Outcome: Progressing  Goal: Optimal Nutrition Intake  Outcome: Progressing  Goal: Effective Oxygenation and Ventilation  10/15/2024 0523 by Maciej Webster LPN  Outcome: Progressing  10/15/2024 0324 by Maciej Webster LPN  Outcome: Progressing  Goal: Effective Urinary Elimination  Outcome: Progressing     Problem: Infection  Goal: Absence of Infection Signs and Symptoms  Outcome: Progressing     Problem: Fall Injury Risk  Goal: Absence of Fall and Fall-Related Injury  10/15/2024 0523 by Maciej Webster LPN  Outcome: Progressing  10/15/2024 0324 by Maciej Webster LPN  Outcome: Progressing     Problem: Comorbidity Management  Goal: Blood Pressure in Desired Range  10/15/2024 0523 by Maciej Webster  LPN  Outcome: Progressing  10/15/2024 0324 by Maciej Webster LPN  Outcome: Progressing

## 2024-10-15 NOTE — ED NOTES
Report received care assumed, lying in bed supine awake, alert and oriented. Respiration's even and unlabored. Denies pain or discomfort at this time. Head of Bed up, bed low and locked, Call light within reach. Son at bedside.

## 2024-10-15 NOTE — PT/OT/SLP EVAL
"Physical Therapy Evaluation and Discharge Note    Patient Name:  Donnie Landon   MRN:  [id number]    Recommendations:     Discharge Recommendations:  (Outpatient OT and SLP (no PT indicated))  Discharge Equipment Recommendations: none   Barriers to discharge: None    Assessment:     Donnie Landon is a 64 y.o. male admitted with a medical diagnosis of Stroke. .  At this time, patient is functioning at their prior level of function and does not require further acute PT services.     Patient seen for physical therapy evaluation on this date.  Patient with no significant physical therapy deficits on evaluation.  Noted to have decreased coordination and mild strength deficits on L hand with continued L facial droop.  Patient ambulated with no AD on level surfaces at independent level.  Single leg stance and tandem ambulation WNL.  No further PT indicated.      Recent Surgery: * No surgery found *      Plan:     During this hospitalization, patient does not require further acute PT services.  Please re-consult if situation changes.      Subjective     Chief Complaint: "I just want to eat, but I have been having a hard time"  Patient/Family Comments/goals: to return to PLOF  Pain/Comfort:  Pain Rating 1: 0/10  Pain Rating Post-Intervention 1: 0/10    Patients cultural, spiritual, Lutheran conflicts given the current situation: no    Living Environment:  Patient lives with spouse and adult son in 2nd story apartment, 6-7 steps to reach apartment with no HR.    Prior to admission, patients level of function was Independent, + Driving, Retired.  Equipment used at home: none.  DME owned (not currently used): none.  Upon discharge, patient will have assistance from spouse and son.    Objective:     Communicated with nurse Hall prior to session.  Patient found supine with telemetry upon PT entry to room.    General Precautions: Standard, fall, NPO    Orthopedic Precautions:N/A   Braces: N/A  Respiratory Status: Room " air    Exams:  Cognitive Exam:  Patient is oriented to Person, Place, Time, and Situation  Fine Motor Coordination:    -       Impaired  Left hand, finger to nose decreased , Left hand thumb/finger opposition skills decreased in speed and accuracy, Left hand, manipulation of objects decreased , and Left hand, diadochokinesis skill decreased in speed and accuracy - defer to OT evaluation for full details  Gross Motor Coordination:  WFL for B LE  Postural Exam:  Patient presented with the following abnormalities:    -       Rounded shoulders  -       Forward head  Sensation:  reports tingling in Left hand and Left side of face.  Light Touch grossly intact B LE  Skin Integrity/Edema:      -       Skin integrity: Visible skin intact  -       Edema: None noted   LUE Strength: Deficits: Decreased, see OT eval for details  RLE ROM: WFL  RLE Strength: WFL  LLE ROM: WFL  LLE Strength: WFL    Functional Mobility:  Bed Mobility:     Rolling Right: independence  Scooting: independence  Supine to Sit: independence  Sit to Supine: independence  Transfers:     Sit to Stand:  independence with no AD  Gait: on level surfaces x 200' with no AD at independent level, mild decreased Left arm swing   Balance:   Single Leg Stance: equal B, WNL  Tandem Amb:  x 10', maintains path, no assist  Picking up object from floor: independent  Backward amb, turning all WNL    AM-PAC 6 CLICK MOBILITY  Total Score:24       Treatment and Education:  Patient educated on role of physical therapy.  Patient's spouse present for session.  Patient educated on s/s of CVA and importance of seeking immediate medical attention.      AM-PAC 6 CLICK MOBILITY  Total Score:24     Patient left sitting edge of bed with all lines intact, call button in reach, bed alarm on, and nurse notified.    GOALS:   Multidisciplinary Problems       Physical Therapy Goals          Problem: Physical Therapy    Goal Priority Disciplines Outcome Interventions   Physical Therapy Goal      PT, PT/OT Adequate for Care Transition                        History:     Past Medical History:   Diagnosis Date    No known health problems        Past Surgical History:   Procedure Laterality Date    COLONOSCOPY  07/29/2022    COLONOSCOPY N/A 7/29/2022    Procedure: COLONOSCOPY;  Surgeon: Cj Reddy MD;  Location: [facility];  Service: Endoscopy;  Laterality: N/A;    CYST REMOVAL N/A 12/22/2023    Procedure: EXCISION, CYST Perineum;  Surgeon: Donnie Andrade MD;  Location: [facility];  Service: General;  Laterality: N/A;    NO PAST SURGERIES      SOFT TISSUE BIOPSY  07/06/2022    SOFT TISSUE BIOPSY N/A 07/06/2022    Procedure: BIOPSY, SOFT TISSUE, left  back, right back, mid back R flank 5cm;  Surgeon: Jb Bell MD;  Location: [facility];  Service: General;  Laterality: N/A;       Time Tracking:     PT Received On: 10/15/24  PT Start Time: 0956     PT Stop Time: 1009  PT Total Time (min): 13 min     Billable Minutes: Evaluation 5 and Gait Training 8      10/15/2024

## 2024-10-15 NOTE — PLAN OF CARE
Pt is set to d/c home later today. Pt will have his wife transport him home later today. Pt will d/c with outpatient PT/OT. SW added neuro f/u appt to pt's AVS. SW requested additional f/u appts. Rounds completed on pt. All questions addressed. Bedside nurse to discuss d/c medications. Discussed importance to attend all f/u appts and take medications as prescribed. Verbalized understanding. Case Management to sign off.     ANA Mcpherson  [phone]    Future Appointments   Date Time Provider Department Center   10/30/2024  3:00 PM Joann Gordon, CTTS [facility]   11/25/2024  1:00 PM Bucky Michaels MD [facility]        10/15/24 1503   Final Note   Assessment Type Final Discharge Note   Anticipated Discharge Disposition Home   What phone number can be called within the next 1-3 days to see how you are doing after discharge? [phone]   Post-Acute Status   Discharge Delays None known at this time

## 2024-10-21 ENCOUNTER — TELEPHONE (OUTPATIENT)
Dept: NEUROLOGY | Facility: CLINIC | Age: 64
End: 2024-10-21
Payer: MEDICAID

## 2024-10-21 DIAGNOSIS — I69.398 CVA, OLD, ALTERATIONS OF SENSATIONS: Primary | ICD-10-CM

## 2024-10-21 DIAGNOSIS — I69.359 CVA, OLD, HEMIPARESIS: ICD-10-CM

## 2024-10-21 DIAGNOSIS — R20.9 CVA, OLD, ALTERATIONS OF SENSATIONS: Primary | ICD-10-CM

## 2024-10-21 NOTE — TELEPHONE ENCOUNTER
"Received message from Nurse Fabian, Dr. Amanda Yee is referring patient to stroke clinic for follow up. Dr. Yee also requested patient "needs a DSA as an outpatient, as part of stroke work up."    Patient scheduled with Dayan Chris NP 10/22.  "

## 2024-10-22 ENCOUNTER — OFFICE VISIT (OUTPATIENT)
Dept: NEUROLOGY | Facility: CLINIC | Age: 64
End: 2024-10-22
Payer: MEDICAID

## 2024-10-22 VITALS — HEART RATE: 76 BPM | DIASTOLIC BLOOD PRESSURE: 80 MMHG | SYSTOLIC BLOOD PRESSURE: 121 MMHG

## 2024-10-22 DIAGNOSIS — G47.33 OBSTRUCTIVE SLEEP APNEA: ICD-10-CM

## 2024-10-22 DIAGNOSIS — E78.5 HYPERLIPIDEMIA WITH TARGET LDL LESS THAN 70: ICD-10-CM

## 2024-10-22 DIAGNOSIS — Z86.73 HISTORY OF ISCHEMIC MULTIFOCAL MULTIPLE VASCULAR TERRITORIES STROKE: Primary | ICD-10-CM

## 2024-10-22 DIAGNOSIS — I65.21 STENOSIS OF RIGHT CAROTID ARTERY: ICD-10-CM

## 2024-10-22 DIAGNOSIS — R47.1 DYSARTHRIA: ICD-10-CM

## 2024-10-22 DIAGNOSIS — I69.392 FACIAL DROOP AS LATE EFFECT OF CEREBROVASCULAR ACCIDENT (CVA): ICD-10-CM

## 2024-10-22 DIAGNOSIS — I10 PRIMARY HYPERTENSION: ICD-10-CM

## 2024-10-22 PROCEDURE — 1160F RVW MEDS BY RX/DR IN RCRD: CPT | Mod: CPTII,,, | Performed by: NURSE PRACTITIONER

## 2024-10-22 PROCEDURE — 3074F SYST BP LT 130 MM HG: CPT | Mod: CPTII,,, | Performed by: NURSE PRACTITIONER

## 2024-10-22 PROCEDURE — 1159F MED LIST DOCD IN RCRD: CPT | Mod: CPTII,,, | Performed by: NURSE PRACTITIONER

## 2024-10-22 PROCEDURE — 3044F HG A1C LEVEL LT 7.0%: CPT | Mod: CPTII,,, | Performed by: NURSE PRACTITIONER

## 2024-10-22 PROCEDURE — 99999 PR PBB SHADOW E&M-EST. PATIENT-LVL IV: CPT | Mod: PBBFAC,,, | Performed by: NURSE PRACTITIONER

## 2024-10-22 PROCEDURE — 4010F ACE/ARB THERAPY RXD/TAKEN: CPT | Mod: CPTII,,, | Performed by: NURSE PRACTITIONER

## 2024-10-22 PROCEDURE — 3079F DIAST BP 80-89 MM HG: CPT | Mod: CPTII,,, | Performed by: NURSE PRACTITIONER

## 2024-10-22 PROCEDURE — 99215 OFFICE O/P EST HI 40 MIN: CPT | Mod: S$PBB,,, | Performed by: NURSE PRACTITIONER

## 2024-10-22 PROCEDURE — 99214 OFFICE O/P EST MOD 30 MIN: CPT | Mod: PBBFAC | Performed by: NURSE PRACTITIONER

## 2024-10-22 NOTE — PROGRESS NOTES
[facility] VISIT      SUBJECTIVE:    History for Present Illness: Donnie Landon is a 64 y.o.  male  with PMHx of HTN, HLD, JARAD who presents to me today for stroke follow-up    Relevant HPI:  Per chart review patient presented to OSH on 10/14/2024 with 1-2 weeks of left upper extremity numbness tingling with 3 day history of left facial droop, dysarthria and dysphagia.  MRI of the brain indicated scattered foci of diffuse restriction in the right cerebral hemisphere.  CTA head and neck occlusion of the right ICA with reconstitution of the upper carotid siphon near the ophthalmic division.    Interval history:    At the time of today's visit, the patient denies new or worsening focal neurologic symptoms concerning for new stroke or TIA. He is doing well overall with gradual improvements . Patient reports left facial droop , dysarthria and numbness and tingling to LUE. He denies associated vertigo, double vision,  gait imbalance,  language or visual disturbances. He is independent with ADL's. He is not currently participating in outpatient therapy. He is adherent with home medications.    Past Medical History:   Diagnosis Date    No known health problems      Past Surgical History:   Procedure Laterality Date    COLONOSCOPY  07/29/2022    COLONOSCOPY N/A 7/29/2022    Procedure: COLONOSCOPY;  Surgeon: Cj Reddy MD;  Location: [facility];  Service: Endoscopy;  Laterality: N/A;    CYST REMOVAL N/A 12/22/2023    Procedure: EXCISION, CYST Perineum;  Surgeon: Donnie Andrade MD;  Location: [facility] OR;  Service: General;  Laterality: N/A;    NO PAST SURGERIES      SOFT TISSUE BIOPSY  07/06/2022    SOFT TISSUE BIOPSY N/A 07/06/2022    Procedure: BIOPSY, SOFT TISSUE, left  back, right back, mid back R flank 5cm;  Surgeon: Jb Bell MD;  Location: [facility] OR;  Service: General;  Laterality: N/A;     Family History   Problem Relation Name Age of Onset    Hypertension Father       Diabetes Mellitus Father      Heart disease Father      Cancer Neg Hx          Current Outpatient Medications:     aspirin (ECOTRIN) 81 MG EC tablet, Take 1 tablet (81 mg total) by mouth once daily for 21 days, Disp: 21 tablet, Rfl: 0    atorvastatin (LIPITOR) 80 MG tablet, Take 1 tablet (80 mg total) by mouth once daily., Disp: 90 tablet, Rfl: 3    clobetasoL (TEMOVATE) 0.05 % cream, Apply topically as needed., Disp: , Rfl:     clopidogreL (PLAVIX) 75 mg tablet, Take 1 tablet (75 mg total) by mouth once daily., Disp: 30 tablet, Rfl: 11    fluticasone propionate (FLONASE) 50 mcg/actuation nasal spray, 1 spray by Each Nostril route., Disp: , Rfl:     gabapentin (NEURONTIN) 100 MG capsule, Take 100 mg by mouth once daily., Disp: , Rfl:     losartan (COZAAR) 25 MG tablet, Take 25 mg by mouth once daily., Disp: , Rfl:     mupirocin (BACTROBAN) 2 % ointment, APPLY TO NAIL BED TWICE DAILY FOR ONE MONTH REPEAT AS NEEDED, Disp: , Rfl:     nicotine (NICODERM CQ) 21 mg/24 hr, Place 1 patch onto the skin once daily., Disp: 28 patch, Rfl: 0    nystatin (NYSTOP) powder, APPLY A THIN LAYER OF POWDER TOPICALLY TO AFFECTED AREA TWICE DAILY FOR 30 DAYS FOR TINEA PEDIS AS A MAINTENANCE THERAPY., Disp: , Rfl:     oxybutynin (DITROPAN-XL) 10 MG 24 hr tablet, Take 10 mg by mouth once daily., Disp: , Rfl:     prednisoLONE acetate (PRED FORTE) 1 % DrpS, Place 1 drop into both eyes 3 (three) times daily., Disp: , Rfl:     triamcinolone acetonide 0.1% (KENALOG) 0.1 % cream, Apply topically 2 (two) times daily., Disp: 45 g, Rfl: 2    azelastine (OPTIVAR) 0.05 % ophthalmic solution, Place 1 drop into both eyes 2 (two) times daily., Disp: 4 mL, Rfl: 11    ketoconazole (NIZORAL) 2 % cream, APPLY CREAM TOPICALLY TO AFFECTED AREA ONCE DAILY BETWEEN THE TOES (Patient not taking: Reported on 10/22/2024), Disp: , Rfl:     levocetirizine (XYZAL) 5 MG tablet, Take 1 tablet every day by oral route at bedtime. (Patient not taking: Reported on  10/22/2024), Disp: , Rfl:     neomycin-polymyxin-dexamethasone (DEXACINE) 3.5 mg/g-10,000 unit/g-0.1 % Oint, APPLY OINTMENT INTO LEFT EYE AT BEDTIME (Patient not taking: Reported on 10/22/2024), Disp: , Rfl:      Review of Systems:  Review of systems is negative except for the symptoms mentioned in HPI    Physical exam:    /80 (BP Location: Right arm, Patient Position: Sitting)   Pulse 76     General: Well-developed, well-groomed. No apparent distress  HENT: Normocephalic, atraumatic.    Cardiovascular: Regular rate and rhythm  Chest: No audible wheezes, stridor, ronchi appreciated.  Musculoskeletal: No peripheral edema     Neurologic Exam: The patient is awake, alert and oriented to person, place, time and situation. Attentive, vigilant during exam. Language is fluent. Naming & repetition intact, +2-step commands.  Fund of knowledge is appropriate. Well organized thoughts.     Cranial nerves:   CN II: Visual fields are full to confrontation. Pupils are 4 mm and briskly reactive to light.  CN III, IV, VI: EOMI, no nystagmus, no ptosis  CN V: Facial sensation is intact in all 3 divisions bilaterally.  CN VII: left facial droop with normal eye closure and smile.  CN VIII: Hearing is normal bilaterally  CN IX, X: Palate elevates symmetrically. Phonation is normal.  CN XI: Head turning and shoulder shrug are intact  CN XII: Tongue is midline with normal movements and no atrophy.     Motor examination of all extremities demonstrates normal bulk and tone in all four limbs. There are no atrophy or fasciculations.        Left Right     Left Right   Deltoid 5/5 5/5   Hip Flexion 5/5 5/5   Biceps 5/5 5/5   Hip Extension 5/5 5/5   Triceps 5/5 5/5   Knee Flexion 4/5 5/5   Wrist Ext 5/5 5/5   Knee Extension 4/5 5/5   Finger Abd 5/5 5/5   Ankle dorsiflex 4/5 5/5           Ankle plantar flex 5/5 5/5     Sensory examination decreased sensation to light touch LUE.    Deep tendon reflexes No clonus. 2+ Bl     Gait: Normal  tandem, and casual gait.     Coordination:  Rapid alternating movements and fine finger movements are intact.         Relevant Labwork:  Recent Labs   Lab 10/14/24  1058 10/14/24  1827   Hemoglobin A1C  --  5.9 H   LDL Cholesterol 110.6  --    HDL 27 L  --    Triglycerides 172 H  --    Cholesterol 172  --        Diagnostic Results:  Imaging was independently reviewed by myself, along with the associated radiology report    Brain Imaging   MRI brain 10/14/24:  Scattered foci of diffusion restriction in the right cerebral hemisphere, suggestive of acute embolic infarctions in the right cerebral hemisphere predominantly within the anterior circulation.    No evidence of hemorrhage.  Absence of the normal flow void within the right ICA.  A more proximal right ICA occlusion is suspected.    CTA of the head and neck is recommended for further evaluation.  Vessel Imaging   CTA head and neck 10/14/24:  Occlusion of the right internal carotid artery with reconstitution of the upper carotid siphon near the ophthalmic division.  No string sign identified.  No intracranial stenosis or occlusion.  No evidence of infarct or enhancing intracranial mass.  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  It appears well encapsulated.  Differential includes benign and malignant parotid tumors.  ENT consultation advised.  Cardiac Imaging   TTE  10/14/24:  Left Ventricle: The left ventricle is normal in size. There is normal systolic function with a visually estimated ejection fraction of 55 - 60%. There is normal diastolic function.  Right Ventricle: Normal right ventricular cavity size. Systolic function is normal.  Pulmonary Artery: No pulmonary hypertension. The estimated pulmonary artery systolic pressure is 17 mmHg.  IVC/SVC: Normal venous pressure at 3 mmHg.  Negative bubble study     Assessment:  1. History of ischemic multifocal multiple vascular territories stroke        2. Dysarthria        3. Facial  droop as late effect of cerebrovascular accident (CVA)        4. Primary hypertension        5. Hyperlipidemia with target LDL less than 70        6. Obstructive sleep apnea        7. Stenosis of right carotid artery          Donnie Landon  is a 64 y.o.  male  seen today in clinic for follow-up assessment and recommendations. The following recommendations and plan were discussed in depth with the patient who voiced understanding and was in agreement.  Plan:  History of multi vascular multi territorial stroke  -Stroke etiology suspected [place] -carotid stenosis  --In-depth discussion with patient regarding diagnosis ,imaging findings  & stroke risk factors  -No current clinical indication for anticoagulation at this time.   -Plan for aggressive risk factor modification and maximum medical management  -- Antithrombotics/ Anticoagulation:  Given evidence of right carotid stenosis/occlusion on diagnostic imaging we will continue dual antiplatelet therapy with aspirin 81 mg and Plavix 75 mg daily, indefinitely  - Stroke Risk Factors:  Carotid stenosis, JARAD, HTN, HLD  - Lipid Management: Target is LDL < 70mg/dL. Continue atorvastatin 80 mg daily. Monitoring for liver dysfunction and myopathy is suggested for statins. To be addressed by PCP  -Blood Pressure: given evidence of carotid stenosis on CTA; patient should remain as close to goal as possible while avoid episodes of hypotension and ensuring adequate cerebral perfusion; monitor for the development neurological symptoms that suggest insufficiency cerebral perfusion  -JARAD :Stroke Risk factor . Continue with CPAP at night. Follow up with PCP for surveillance and chronic management  - Rehab efforts: The patient continued to exhibit weakness with numbness and tingling to left upper extremity as a result of recent stroke. He would benefit from outpatient  occupational therapy/physical therapy to address Gait Training, Manual Therapy,  Neuromuscular Re-ed, Orthotic  Management and Training,  Patient Education, Self Care, Therapeutic Activities, Therapeutic Exercise  - Diagnostics ordered/pending: Etiology unclear ( subacute thalamic infarct). Due to this, we believe it is reasonable to try and rule out cardio embolic etiology with a 30 day event monitor    Parotid [facility]  -Ambulatory referral to ENT for assessment and recommendation    Patient/Family teaching provided during this visit  -Identifying the signs and symptoms of stroke; emergency action and ER attention  -Risk factor control  -Optimization for secondary stroke prevention including compliance with current medications   -We discussed the need to optimize lifestyle choices   -heart healthy diet (Mediterranean,MIND  or dash) to include a variety of brain friendly foods to help optimize cognitive health and longevity  -increased cardiovascular exercise; goal of 150 minutes of moderate-intense per week  -Discussed that medication/lifestyle modifications are preventative,and nothing is absolute.     Questions and concerns were answered to the patient's stated verbal satisfaction. The patient verbalizes understanding and agreement with the above stated treatment plan.      I will plan on having Donnie Landon return to clinic as needed. The patient can contact my office with any questions or concerns they may have as they arise in the interim.       Collaborating Physician, Dr De Leon, was available during today's encounter. Any change to plan along with cosign to appear in the EMR    TWIN Ramos  Department of Vascular Neurology  [facility]  [phone]    This note is dictated on M*Modal Fluency Direct word recognition program. There are word recognition mistakes that are occasionally missed on review

## 2024-10-24 ENCOUNTER — OFFICE VISIT (OUTPATIENT)
Dept: OTOLARYNGOLOGY | Facility: CLINIC | Age: 64
End: 2024-10-24
Payer: MEDICAID

## 2024-10-24 VITALS — WEIGHT: 194.88 LBS | BODY MASS INDEX: 33.45 KG/M2

## 2024-10-24 DIAGNOSIS — I63.9 CEREBROVASCULAR ACCIDENT (CVA), UNSPECIFIED MECHANISM: ICD-10-CM

## 2024-10-24 DIAGNOSIS — I63.131 EMBOLIC STROKE INVOLVING RIGHT CAROTID ARTERY: ICD-10-CM

## 2024-10-24 DIAGNOSIS — K11.8 PAROTID MASS: Primary | ICD-10-CM

## 2024-10-24 PROCEDURE — 99999 PR PBB SHADOW E&M-EST. PATIENT-LVL IV: CPT | Mod: PBBFAC,,, | Performed by: OTOLARYNGOLOGY

## 2024-10-24 PROCEDURE — 99214 OFFICE O/P EST MOD 30 MIN: CPT | Mod: PBBFAC | Performed by: OTOLARYNGOLOGY

## 2024-10-24 NOTE — PROGRESS NOTES
Chief Complaint   Patient presents with    parotid tumor       HPI   64 y.o. male presents for evaluation of a left-sided parotid mass incidentally noted on CTA of the neck as part of a stroke workup.  No complaints regarding this mass.  No history of cutaneous malignancy of the left face or scalp.    Review of Systems   Constitutional: Negative for fatigue and unexpected weight change.   HENT: Per HPI.  Eyes: Negative for visual disturbance.   Respiratory: Negative for shortness of breath, hemoptysis   Cardiovascular: Negative for chest pain and palpitations.   Musculoskeletal: Negative for decreased ROM, back pain.   Skin: Negative for rash, sunburn, itching.   Neurological: Negative for dizziness and seizures.   Hematological: Negative for adenopathy. Does not bruise/bleed easily.   Endocrine: Negative for rapid weight loss/weight gain, heat/cold intolerance.     Past Medical History   Patient Active Problem List   Diagnosis    Tinea pedis of both feet    Dermatitis    Snoring    Obstructive sleep apnea    Cyst of perineum in male    Wound dehiscence    History of ischemic multifocal multiple vascular territories stroke    Dysarthria    Primary hypertension    Hyperlipidemia with target LDL less than 70    Facial droop as late effect of cerebrovascular accident (CVA)    Embolic stroke involving right carotid artery    Parotid mass           Past Surgical History   Past Surgical History:   Procedure Laterality Date    COLONOSCOPY  07/29/2022    COLONOSCOPY N/A 7/29/2022    Procedure: COLONOSCOPY;  Surgeon: Cj Reddy MD;  Location: [facility];  Service: Endoscopy;  Laterality: N/A;    CYST REMOVAL N/A 12/22/2023    Procedure: EXCISION, CYST Perineum;  Surgeon: Donnie Andrade MD;  Location[facility] OR;  Service: General;  Laterality: N/A;    NO PAST SURGERIES      SOFT TISSUE BIOPSY  07/06/2022    SOFT TISSUE BIOPSY N/A 07/06/2022    Procedure: BIOPSY, SOFT TISSUE, left  back, right back, mid back R flank  5cm;  Surgeon: Jb Bell MD;  Location: [facility];  Service: General;  Laterality: N/A;         Family History   Family History   Problem Relation Name Age of Onset    Hypertension Father      Diabetes Mellitus Father      Heart disease Father      Cancer Neg Hx             Social History   .  Social History     Socioeconomic History    Marital status: Unknown   Tobacco Use    Smoking status: Every Day     Current packs/day: 0.80     Average packs/day: 0.8 packs/day for 46.8 years (37.4 ttl pk-yrs)     Types: Cigarettes     Start date: 1978    Smokeless tobacco: Never    Tobacco comments:     Enrolled in the [employer] on 7/1/22 ([employer] Member ID # [id number]). Pt is a 0.5 to 0.9 pk/day cigarette smoker x 47 yrs. Pt states ready to quit smoking. Ambulatory referral to Smoking Cessation clinic following hospital discharge.   Substance and Sexual Activity    Alcohol use: Never    Drug use: Never   Social History Narrative    From [place], came to [place]    Tobacco: Initiated at age 22 yo; 0.5 ppd at present     EtOH: None    Drug: None    Employment: ; will retire in 2023    Education: 8th grade     Lives with son, wife          Social Drivers of Health     Financial Resource Strain: Low Risk  (10/14/2024)    Overall Financial Resource Strain (CARDIA)     Difficulty of Paying Living Expenses: Not hard at all   Recent Concern: Financial Resource Strain - High Risk (10/14/2024)    Overall Financial Resource Strain (CARDIA)     Difficulty of Paying Living Expenses: Very hard   Food Insecurity: No Food Insecurity (10/14/2024)    Hunger Vital Sign     Worried About Running Out of Food in the Last Year: Never true     Ran Out of Food in the Last Year: Never true   Recent Concern: Food Insecurity - Food Insecurity Present (10/14/2024)    Hunger Vital Sign     Worried About Running Out of Food in the Last Year: Patient declined     Ran Out of Food in the Last Year: Sometimes true    Transportation Needs: No Transportation Needs (10/14/2024)    TRANSPORTATION NEEDS     Transportation : No   Physical Activity: Inactive (10/14/2024)    Exercise Vital Sign     Days of Exercise per Week: 0 days     Minutes of Exercise per Session: 0 min   Stress: Stress Concern Present (10/14/2024)    [employer] [place] [employer] - Occupational Stress Questionnaire     Feeling of Stress : To some extent   Housing Stability: Low Risk  (10/14/2024)    Housing Stability Vital Sign     Unable to Pay for Housing in the Last Year: No     Homeless in the Last Year: No         Allergies   Review of patient's allergies indicates:  No Known Allergies        Physical Exam     There were no vitals filed for this visit.      Body mass index is 33.45 kg/m².      General: AOx3, NAD   Respiratory:  Symmetric chest rise, normal effort  Nose: No gross nasal septal deviation. Inferior Turbinates WNL bilaterally. No septal perforation. No masses/lesions.   Oral Cavity:  Oral Tongue mobile, no lesions noted. Hard Palate WNL. No buccal or FOM lesions.  Oropharynx:  No masses/lesions of the posterior pharyngeal wall. Tonsillar fossa without lesions. Soft palate without masses. Midline uvula.   Neck: No scars.  No cervical lymphadenopathy, thyromegaly or thyroid nodules.  Normal range of motion.    Face: House Brackmann I bilaterally.  Subtle fullness of left tail of parotid.    Neck CTA reviewed.    Assessment/Plan  Problem List Items Addressed This Visit          Neuro    Embolic stroke involving right carotid artery       ENT    Parotid mass - Primary (Chronic)     Left neck ultrasound ordered.  Plan for ultrasound-guided FNA following this study.         Relevant Orders    US Soft Tissue Head Neck     Other Visit Diagnoses       Cerebrovascular accident (CVA), unspecified mechanism

## 2024-10-30 ENCOUNTER — TELEPHONE (OUTPATIENT)
Dept: SMOKING CESSATION | Facility: CLINIC | Age: 64
End: 2024-10-30
Payer: MEDICAID

## 2024-11-02 DIAGNOSIS — K11.8 PAROTID MASS: Primary | ICD-10-CM

## 2024-11-08 ENCOUNTER — TELEPHONE (OUTPATIENT)
Dept: INTERVENTIONAL RADIOLOGY/VASCULAR | Facility: CLINIC | Age: 64
End: 2024-11-08
Payer: MEDICAID

## 2024-11-08 DIAGNOSIS — K11.8 PAROTID MASS: Primary | Chronic | ICD-10-CM

## 2024-11-11 DIAGNOSIS — K11.9 LESION OF PAROTID GLAND: ICD-10-CM

## 2024-11-11 RX ORDER — LIDOCAINE HYDROCHLORIDE 10 MG/ML
1 INJECTION, SOLUTION EPIDURAL; INFILTRATION; INTRACAUDAL; PERINEURAL ONCE
Status: CANCELLED | OUTPATIENT
Start: 2024-11-11 | End: 2024-11-11

## 2024-11-13 ENCOUNTER — TELEPHONE (OUTPATIENT)
Dept: INTERVENTIONAL RADIOLOGY/VASCULAR | Facility: HOSPITAL | Age: 64
End: 2024-11-13
Payer: MEDICAID

## 2024-11-13 NOTE — NURSING
"Arrive at 8:15. where to check in, no need to fast, may drive self, take medications as usual, bring current list of home meds. Confirmed no allergies; last aspirin and Plavix "two days ago".   "

## 2024-11-14 ENCOUNTER — HOSPITAL ENCOUNTER (OUTPATIENT)
Dept: INTERVENTIONAL RADIOLOGY/VASCULAR | Facility: HOSPITAL | Age: 64
Discharge: HOME | End: 2024-11-14
Attending: FAMILY MEDICINE
Payer: MEDICAID

## 2024-11-14 VITALS
BODY MASS INDEX: 32.44 KG/M2 | RESPIRATION RATE: 19 BRPM | HEART RATE: 63 BPM | HEIGHT: 64 IN | OXYGEN SATURATION: 97 % | DIASTOLIC BLOOD PRESSURE: 77 MMHG | TEMPERATURE: 98 F | WEIGHT: 190 LBS | SYSTOLIC BLOOD PRESSURE: 154 MMHG

## 2024-11-14 DIAGNOSIS — K11.8 PAROTID MASS: Chronic | ICD-10-CM

## 2024-11-14 DIAGNOSIS — K11.9 LESION OF PAROTID GLAND: ICD-10-CM

## 2024-11-14 PROCEDURE — 63600175 PHARM REV CODE 636 W HCPCS: Performed by: RADIOLOGY

## 2024-11-14 RX ORDER — ASPIRIN 81 MG/1
81 TABLET ORAL DAILY
Qty: 21 TABLET | Refills: 0 | Status: CANCELLED | OUTPATIENT
Start: 2024-11-14 | End: 2024-12-05

## 2024-11-14 RX ORDER — LIDOCAINE HYDROCHLORIDE 10 MG/ML
1 INJECTION, SOLUTION EPIDURAL; INFILTRATION; INTRACAUDAL; PERINEURAL ONCE
Status: DISCONTINUED | OUTPATIENT
Start: 2024-11-14 | End: 2024-11-15 | Stop reason: HOSPADM

## 2024-11-14 RX ORDER — LIDOCAINE HYDROCHLORIDE 10 MG/ML
INJECTION, SOLUTION INFILTRATION; PERINEURAL
Status: COMPLETED | OUTPATIENT
Start: 2024-11-14 | End: 2024-11-14

## 2024-11-14 RX ADMIN — LIDOCAINE HYDROCHLORIDE 3 ML: 10 INJECTION, SOLUTION INFILTRATION; PERINEURAL at 09:11

## 2024-11-14 NOTE — PLAN OF CARE
IR care complete. Discharge instructions and AVS provided. Pt verbalized understanding, questions and concerns addressed. No further needs at this time. Pt discharged from recovery area at 0955 in NAD.

## 2024-11-14 NOTE — PROCEDURES
Radiology Post-Procedure Note    Pre Op Diagnosis: Left parotid lesion  Post Op Diagnosis: Same    Procedure: Left parotid lesion FNA    Procedure performed by: Martin Schuster MD    Written Informed Consent Obtained: Yes  Specimen Removed: YES 1 mL of thick brown fluid aspirated  Estimated Blood Loss: Minimal    Findings:   Stable cystic lesion of the left parotid gland. Aspiration with 5 Fr one-step with return of 1 mL of thick brown fluid.     Patient tolerated procedure well.    Martin Schuster MD  Interventional Radiology  Department of Radiology

## 2024-11-14 NOTE — SEDATION DOCUMENTATION
Pt arrived to C ARM for left parotid mass bx. Pt oriented to unit and staff. Plan of care reviewed with patient, patient verbalizes understanding. Comfort measures utilized.  Pt prepped and draped utilizing standard sterile technique. Patient placed on continuous monitoring, as required by sedation policy. Timeouts completed utilizing standard universal time-out, per department and facility policy. RN to remain at bedside, continuous monitoring maintained. Pt resting comfortably. Denies pain/discomfort. Will continue to monitor. See flow sheets for monitoring, medication administration, and updates.

## 2024-11-15 RX ORDER — ASPIRIN 81 MG/1
81 TABLET ORAL DAILY
Qty: 21 TABLET | Refills: 0 | Status: CANCELLED | OUTPATIENT
Start: 2024-11-14 | End: 2024-12-05

## 2024-11-18 ENCOUNTER — PATIENT OUTREACH (OUTPATIENT)
Dept: ADMINISTRATIVE | Facility: HOSPITAL | Age: 64
End: 2024-11-18
Payer: MEDICAID

## 2024-11-18 RX ORDER — ASPIRIN 81 MG/1
81 TABLET ORAL DAILY
Qty: 21 TABLET | Refills: 0 | Status: CANCELLED | OUTPATIENT
Start: 2024-11-14 | End: 2024-12-05

## 2024-11-18 NOTE — PROGRESS NOTES
Health Maintenance Due   Topic Date Due    RSV Vaccine (Age 60+ and Pregnant patients) (1 - Risk 60-74 years 1-dose series) Never done    LDCT Lung Screen  09/08/2023    COVID-19 Vaccine (1 - 2024-25 season) 09/01/2024    UPLOADED FLU

## 2024-11-25 ENCOUNTER — OFFICE VISIT (OUTPATIENT)
Dept: NEUROLOGY | Facility: CLINIC | Age: 64
End: 2024-11-25
Payer: MEDICAID

## 2024-11-25 VITALS
HEIGHT: 64 IN | HEART RATE: 65 BPM | DIASTOLIC BLOOD PRESSURE: 79 MMHG | BODY MASS INDEX: 32.74 KG/M2 | WEIGHT: 191.81 LBS | SYSTOLIC BLOOD PRESSURE: 132 MMHG

## 2024-11-25 DIAGNOSIS — I63.131 EMBOLIC STROKE INVOLVING RIGHT CAROTID ARTERY: ICD-10-CM

## 2024-11-25 DIAGNOSIS — R07.9 CHEST PAIN, UNSPECIFIED TYPE: ICD-10-CM

## 2024-11-25 DIAGNOSIS — I63.9 CEREBROVASCULAR ACCIDENT (CVA), UNSPECIFIED MECHANISM: Primary | ICD-10-CM

## 2024-11-25 PROCEDURE — 99215 OFFICE O/P EST HI 40 MIN: CPT | Mod: PBBFAC,PN | Performed by: STUDENT IN AN ORGANIZED HEALTH CARE EDUCATION/TRAINING PROGRAM

## 2024-11-25 PROCEDURE — 99999 PR PBB SHADOW E&M-EST. PATIENT-LVL V: CPT | Mod: PBBFAC,,, | Performed by: STUDENT IN AN ORGANIZED HEALTH CARE EDUCATION/TRAINING PROGRAM

## 2024-11-25 RX ORDER — CLOPIDOGREL BISULFATE 75 MG/1
75 TABLET ORAL DAILY
Qty: 30 TABLET | Refills: 11 | Status: SHIPPED | OUTPATIENT
Start: 2024-11-25 | End: 2025-11-25

## 2024-11-25 RX ORDER — ATORVASTATIN CALCIUM 80 MG/1
80 TABLET, FILM COATED ORAL DAILY
Qty: 90 TABLET | Refills: 3 | Status: SHIPPED | OUTPATIENT
Start: 2024-11-25 | End: 2025-11-25

## 2024-11-25 NOTE — PROGRESS NOTES
[facility]  Clinic Note    Date of Service: 11/25/2024  Patient seen at the request of: Self, Aaareferral    Reason for Consultation  Stroke    Assessment:  Donnie Landon is a 64 y.o. male who presents with     Patient presents for follow up after an embolic-appearing right frontal lobe infarct with residual left side droop, left finger numbness, and some dysphagia.  CTA was significant for a fully occluded right internal carotid artery.  We will continue on dual antiplatelet therapy indefinitely.    Patient reports that he has been trying to become more active since the stroke.  Patient also stopped smoking one month ago after a 40 year smoking history.  While once walking in a nearby park, the patient reports onset of chest pain associated with nausea.  We will refer to Cardiology as the patient has multiple vascular risk factors.      Plan:    - continue dual antiplatelet therapy with aspirin 81 mg and Plavix 75 mg daily, indefinitely   - atorvastatin 80 mg daily   - continue with CPAP at night   - ambulatory telemetry  - continue physical therapy  - referral to Cardiology    - RTC in three months    A dictation device was used to produce this document. Use of such devices sometimes results in grammatical errors or replacement of words that sound similarly.     Signed:    Bucky Michaels MD  Neurology/Epilepsy  11/25/2024 8:18 AM          HPI:  Donnie Landon is a 64 y.o. male with   Past Medical History:   Diagnosis Date    No known health problems      Patient reports getting a chest pain associated with nausea and mouth watering while he was walking in the park.  This did not progress so he did not present with the emergency room.     He reports some persistent dysphagia.  Patient reports that speech is back to baseline.  Patient reports persistent numbness in the left fingers.       Patient stopped smoking (40 year smoking history) for the last year.      Per neurology note:  Relevant HPI:  Per  chart review patient presented to [facility]SH on 10/14/2024 with 1-2 weeks of left upper extremity numbness tingling with 3 day history of left facial droop, dysarthria and dysphagia.  MRI of the brain indicated scattered foci of diffuse restriction in the right cerebral hemisphere.  CTA head and neck occlusion of the right ICA with reconstitution of the upper carotid siphon near the ophthalmic division.     Interval history:    At the time of today's visit, the patient denies new or worsening focal neurologic symptoms concerning for new stroke or TIA. He is doing well overall with gradual improvements . Patient reports left facial droop , dysarthria and numbness and tingling to LUE. He denies associated vertigo, double vision,  gait imbalance,  language or visual disturbances. He is independent with ADL's. He is not currently participating in outpatient therapy. He is adherent with home medications.    MRI brain 10/14/24:  Scattered foci of diffusion restriction in the right cerebral hemisphere, suggestive of acute embolic infarctions in the right cerebral hemisphere predominantly within the anterior circulation.    No evidence of hemorrhage.  Absence of the normal flow void within the right ICA.  A more proximal right ICA occlusion is suspected.    CTA of the head and neck is recommended for further evaluation.  Vessel Imaging   CTA head and neck 10/14/24:  Occlusion of the right internal carotid artery with reconstitution of the upper carotid siphon near the ophthalmic division.  No string sign identified.  No intracranial stenosis or occlusion.  No evidence of infarct or enhancing intracranial mass.  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  It appears well encapsulated.  Differential includes benign and malignant parotid tumors.  ENT consultation advised.  Cardiac Imaging   TTE  10/14/24:  Left Ventricle: The left ventricle is normal in size. There is normal systolic function with a  visually estimated ejection fraction of 55 - 60%. There is normal diastolic function.  Right Ventricle: Normal right ventricular cavity size. Systolic function is normal.  Pulmonary Artery: No pulmonary hypertension. The estimated pulmonary artery systolic pressure is 17 mmHg.  IVC/SVC: Normal venous pressure at 3 mmHg.  Negative bubble study      This is the extent of the patient's complaints at this time.    TSH   Date Value Ref Range Status   10/14/2024 0.791 0.400 - 4.000 uIU/mL Final   11/13/2023 0.607 0.400 - 4.000 uIU/mL Final     Vitamin B-12   Date Value Ref Range Status   11/13/2023 474 210 - 950 pg/mL Final     Hemoglobin A1C   Date Value Ref Range Status   10/14/2024 5.9 (H) 4.0 - 5.6 % Final     Comment:     ADA Screening Guidelines:  5.7-6.4%  Consistent with prediabetes  >or=6.5%  Consistent with diabetes    High levels of fetal hemoglobin interfere with the HbA1C  assay. Heterozygous hemoglobin variants (HbS, HgC, etc)do  not significantly interfere with this assay.   However, presence of multiple variants may affect accuracy.     11/13/2023 5.5 4.0 - 5.6 % Final     Comment:     ADA Screening Guidelines:  5.7-6.4%  Consistent with prediabetes  >or=6.5%  Consistent with diabetes    High levels of fetal hemoglobin interfere with the HbA1C  assay. Heterozygous hemoglobin variants (HbS, HgC, etc)do  not significantly interfere with this assay.   However, presence of multiple variants may affect accuracy.           Review of Systems:  ROS negative unless noted in HPI    Past Surgical History:  Past Surgical History:   Procedure Laterality Date    COLONOSCOPY  07/29/2022    COLONOSCOPY N/A 7/29/2022    Procedure: COLONOSCOPY;  Surgeon: Cj Reddy MD;  Location: [facility] ENDO;  Service: Endoscopy;  Laterality: N/A;    CYST REMOVAL N/A 12/22/2023    Procedure: EXCISION, CYST Perineum;  Surgeon: Donnie Andrade MD;  Location: [facility] OR;  Service: General;  Laterality: N/A;    NO PAST SURGERIES      SOFT  TISSUE BIOPSY  07/06/2022    SOFT TISSUE BIOPSY N/A 07/06/2022    Procedure: BIOPSY, SOFT TISSUE, left  back, right back, mid back R flank 5cm;  Surgeon: Jb Bell MD;  Location: [facility];  Service: General;  Laterality: N/A;       Family History:  Family History   Problem Relation Name Age of Onset    Hypertension Father      Diabetes Mellitus Father      Heart disease Father      Cancer Neg Hx         Social History:  Social History     Tobacco Use    Smoking status: Every Day     Current packs/day: 0.80     Average packs/day: 0.8 packs/day for 46.9 years (37.5 ttl pk-yrs)     Types: Cigarettes     Start date: 1978    Smokeless tobacco: Never    Tobacco comments:     Enrolled in the [employer] on 7/1/22 ([facility] Member ID # [id number]). Pt is a 0.5 to 0.9 pk/day cigarette smoker x 47 yrs. Pt states ready to quit smoking. Ambulatory referral to Smoking Cessation clinic following hospital discharge.   Substance Use Topics    Alcohol use: Never    Drug use: Never       Allergies:  Patient has no known allergies.    Outpatient Medications:  Prior to Admission medications    Medication Sig Start Date End Date Taking? Authorizing Provider   acyclovir 5% (ZOVIRAX) 5 % ointment Apply topically to the affected area every 4 hours 11/1/24   Ajith Sanabria MD   aspirin (ECOTRIN) 81 MG EC tablet Take 1 tablet (81 mg total) by mouth once daily for 21 days 10/16/24 11/6/24  Audra Vivas MD   atorvastatin (LIPITOR) 80 MG tablet Take 1 tablet (80 mg total) by mouth once daily. 10/16/24 10/16/25  Audra Vivas MD   azelastine (OPTIVAR) 0.05 % ophthalmic solution Place 1 drop into both eyes 2 (two) times daily. 6/19/23 6/18/24  Jose Allen MD   clobetasoL (TEMOVATE) 0.05 % cream Apply topically as needed. 12/14/23   Provider, Historical   clopidogreL (PLAVIX) 75 mg tablet Take 1 tablet (75 mg total) by mouth once daily. 10/16/24 10/16/25  Audra Vivas MD   diclofenac sodium 100 mg 24 hr tablet Take 100  "mg by mouth every 8 (eight) hours. 11/1/24   Ajith Sanabria MD   fluticasone propionate (FLONASE) 50 mcg/actuation nasal spray 1 spray by Each Nostril route. 11/13/23   Provider, Historical   gabapentin (NEURONTIN) 100 MG capsule Take 100 mg by mouth once daily. 12/14/23   Provider, Historical   ketoconazole (NIZORAL) 2 % cream APPLY CREAM TOPICALLY TO AFFECTED AREA ONCE DAILY BETWEEN THE TOES  Patient not taking: Reported on 10/14/2024    Provider, Historical   levocetirizine (XYZAL) 5 MG tablet Take 1 tablet every day by oral route at bedtime.  Patient not taking: Reported on 10/14/2024    Provider, Historical   losartan (COZAAR) 25 MG tablet Take 25 mg by mouth once daily. 10/6/24   Provider, Historical   mupirocin (BACTROBAN) 2 % ointment APPLY TO NAIL BED TWICE DAILY FOR ONE MONTH REPEAT AS NEEDED 8/11/23   Provider, Historical   neomycin-polymyxin-dexamethasone (DEXACINE) 3.5 mg/g-10,000 unit/g-0.1 % Oint APPLY OINTMENT INTO LEFT EYE AT BEDTIME  Patient not taking: Reported on 10/14/2024 12/10/23   Provider, Historical   nystatin (NYSTOP) powder APPLY A THIN LAYER OF POWDER TOPICALLY TO AFFECTED AREA TWICE DAILY FOR 30 DAYS FOR TINEA PEDIS AS A MAINTENANCE THERAPY.    Provider, Historical   oxybutynin (DITROPAN-XL) 10 MG 24 hr tablet Take 10 mg by mouth once daily. 12/10/23   Provider, Historical   prednisoLONE acetate (PRED FORTE) 1 % DrpS Place 1 drop into both eyes 3 (three) times daily.    Provider, Historical   triamcinolone acetonide 0.1% (KENALOG) 0.1 % cream Apply topically 2 (two) times daily. 6/19/23   Jose Allen MD       Physical exam:    Vitals: /79 (BP Location: Left arm, Patient Position: Sitting)   Pulse 65   Ht 5' 4" (1.626 m)   Wt 87 kg (191 lb 12.8 oz)   BMI 32.92 kg/m²     General:   Sitting in chair, in no distress, well-nourished, well-developed, appears stated age.  Head/Neck:   Normocephalic,atraumatic  Pulm:  Non-labored breathing     Mental Status: Alert and oriented " to person, time, place, situation. Speech spontaneous and fluent without paraphasias; no dysarthria  CN:  II: visual fields full  III, IV, VI: EOM intact without nystagmus or diplopia.   V: Sensation to light touch full and symmetric in V1-3. Masseter contraction full bilaterally.   VII: Left facial droop  VIII: Hearing grossly normal to conversation.  IX, X: Palate midline with symmetric elevation.    XI: SCM and trapezius: 5/5 bilaterally.   XII: Tongue midline without fasciculations.  Motor: Normal bulk and tone throughout all four extremities.   LUE: D: 5/5; B: 4+/5; T:  4+/5; WF: 5/5; WE:  5/5; IO: 5/5   RUE: D: 5/5; B: 5/5; T:  5/5; WF:5/5; WE:  5/5; IO: 5/5   LLE: HF: 5/5, KE: 5/5, KF: 5/5, DF: 5/5, PF: 5/5  RLE: HF: 5/5, KE: 5/5, KF: 5/5, DF: 5/5, PF: 5/5  No tremors   Sensory: Numbness in left fingers.  Reflexes: LUE: Biceps 3+ brachioradialis 2+  RUE: Biceps 2+, brachioradialis 2+  LLE: Knee 2+, ankle 2+  RLE: Knee 2+, ankle 2+  Coordination:  Intact and symmetric to finger-to-nose  Gait:  Intact to casual gait.    Imaging:  All pertinent imaging was personally reviewed.    On my personal review:   Images were reviewed with the patient.      Results for orders placed during the hospital encounter of 10/14/24    MRI Brain Without Contrast    Narrative  EXAMINATION:  MRI BRAIN WITHOUT CONTRAST    CLINICAL HISTORY:  Stroke, follow up;    TECHNIQUE:  Multiplanar multisequence MR imaging of the brain was performed without contrast.    COMPARISON:  CT scan of the head dated 10/14/2024.    FINDINGS:  The craniocervical junction is intact.  There is empty sella configuration.  There is absence of the normal flow void signal within the right ICA.  This may represent a more proximal occlusion.  The remainder of the intracranial flow voids are within normal limits.    There are multiple foci of diffusion restriction within the right frontal and parietal convexities.  There is also focus of diffusion restriction in  the right parietooccipital junction.  There is a focus of diffusion restriction within the right centrum semiovale.  There is T2/FLAIR signal hyperintensities corresponding to the regions of diffusion restriction.    The ventricles and sulci are prominent, suggestive of mild cerebral volume loss.  There are no extra-axial fluid collections.  There is no evidence of intracranial hemorrhage.    The orbits and intraorbital contents are unremarkable.  The paranasal sinuses are unremarkable.  There are bilateral mastoid effusions.    Impression  Scattered foci of diffusion restriction in the right cerebral hemisphere, suggestive of acute embolic infarctions in the right cerebral hemisphere predominantly within the anterior circulation.  No evidence of hemorrhage.    Absence of the normal flow void within the right ICA.  A more proximal right ICA occlusion is suspected.  CTA of the head and neck is recommended for further evaluation.    Case discussed with Dr. Vivas on 10/14/2024 18:00.      Electronically signed by: Jim Robledo MD  Date:    10/14/2024  Time:    18:03      I spent a total of 39 minutes on the day of the visit. This includes face to face time and non-face to face time preparing to see the patient (eg, review of tests), obtaining and/or reviewing separately obtained history, documenting clinical information in the electronic or other health record, independently interpreting results and communicating results to the patient/family/caregiver, or care coordinator.

## 2024-11-25 NOTE — PROGRESS NOTES
Patient reports getting a chest pain associated with nausea and mouth watering while he was walking in the park.      He reports some persistent dysphagia.  Patient reports that speech is back to baseline.  Patient reports persistent numbness in the left fingers.      Left facial droop  Left bicep 3+    Bicep and tricep 4+    Patient stopped smoking (40 year smoking history)      - continue physical therapy

## 2024-11-29 ENCOUNTER — PATIENT MESSAGE (OUTPATIENT)
Dept: OTOLARYNGOLOGY | Facility: CLINIC | Age: 64
End: 2024-11-29
Payer: MEDICAID

## 2024-12-02 ENCOUNTER — DOCUMENTATION ONLY (OUTPATIENT)
Dept: CARDIOLOGY | Facility: HOSPITAL | Age: 64
End: 2024-12-02
Payer: MEDICAID

## 2024-12-02 NOTE — PROGRESS NOTES
I called the patient to let them know that tomorrow appointment is only for tracking purposes and that the monitor has been sent out for mailing. Patient has confirmed receipt of the information and will be a no-show for tomorrow appointment. Address confirmed.

## 2024-12-03 ENCOUNTER — CLINICAL SUPPORT (OUTPATIENT)
Dept: CARDIOLOGY | Facility: HOSPITAL | Age: 64
End: 2024-12-03
Attending: STUDENT IN AN ORGANIZED HEALTH CARE EDUCATION/TRAINING PROGRAM
Payer: MEDICAID

## 2024-12-03 DIAGNOSIS — I63.9 CEREBROVASCULAR ACCIDENT (CVA), UNSPECIFIED MECHANISM: ICD-10-CM

## 2024-12-09 ENCOUNTER — OFFICE VISIT (OUTPATIENT)
Dept: OTOLARYNGOLOGY | Facility: CLINIC | Age: 64
End: 2024-12-09
Payer: MEDICAID

## 2024-12-09 VITALS
WEIGHT: 195.13 LBS | SYSTOLIC BLOOD PRESSURE: 110 MMHG | BODY MASS INDEX: 33.49 KG/M2 | DIASTOLIC BLOOD PRESSURE: 71 MMHG

## 2024-12-09 DIAGNOSIS — K11.8 PAROTID MASS: Primary | Chronic | ICD-10-CM

## 2024-12-09 PROCEDURE — 3008F BODY MASS INDEX DOCD: CPT | Mod: CPTII,,, | Performed by: OTOLARYNGOLOGY

## 2024-12-09 PROCEDURE — 4010F ACE/ARB THERAPY RXD/TAKEN: CPT | Mod: CPTII,,, | Performed by: OTOLARYNGOLOGY

## 2024-12-09 PROCEDURE — 99999 PR PBB SHADOW E&M-EST. PATIENT-LVL III: CPT | Mod: PBBFAC,,, | Performed by: OTOLARYNGOLOGY

## 2024-12-09 PROCEDURE — 99213 OFFICE O/P EST LOW 20 MIN: CPT | Mod: S$PBB,,, | Performed by: OTOLARYNGOLOGY

## 2024-12-09 PROCEDURE — 3078F DIAST BP <80 MM HG: CPT | Mod: CPTII,,, | Performed by: OTOLARYNGOLOGY

## 2024-12-09 PROCEDURE — 3074F SYST BP LT 130 MM HG: CPT | Mod: CPTII,,, | Performed by: OTOLARYNGOLOGY

## 2024-12-09 PROCEDURE — 3044F HG A1C LEVEL LT 7.0%: CPT | Mod: CPTII,,, | Performed by: OTOLARYNGOLOGY

## 2024-12-09 PROCEDURE — 99213 OFFICE O/P EST LOW 20 MIN: CPT | Mod: PBBFAC | Performed by: OTOLARYNGOLOGY

## 2024-12-09 PROCEDURE — 1159F MED LIST DOCD IN RCRD: CPT | Mod: CPTII,,, | Performed by: OTOLARYNGOLOGY

## 2024-12-09 NOTE — PROGRESS NOTES
Chief complaint: Left parotid mass      HPI   64 y.o. male presents for evaluation of a left-sided parotid mass incidentally noted on CTA of the neck as part of a stroke workup.  No complaints regarding this mass.  No history of cutaneous malignancy of the left face or scalp.    Recent FNA revealed benign cystic lesion.    Review of Systems   Constitutional: Negative for fatigue and unexpected weight change.   HENT: Per HPI.  Eyes: Negative for visual disturbance.   Respiratory: Negative for shortness of breath, hemoptysis   Cardiovascular: Negative for chest pain and palpitations.   Musculoskeletal: Negative for decreased ROM, back pain.   Skin: Negative for rash, sunburn, itching.   Neurological: Negative for dizziness and seizures.   Hematological: Negative for adenopathy. Does not bruise/bleed easily.   Endocrine: Negative for rapid weight loss/weight gain, heat/cold intolerance.     Past Medical History   Patient Active Problem List   Diagnosis    Tinea pedis of both feet    Dermatitis    Snoring    Obstructive sleep apnea    Cyst of perineum in male    Wound dehiscence    History of ischemic multifocal multiple vascular territories stroke    Dysarthria    Primary hypertension    Hyperlipidemia with target LDL less than 70    Facial droop as late effect of cerebrovascular accident (CVA)    Embolic stroke involving right carotid artery    Parotid mass           Past Surgical History   Past Surgical History:   Procedure Laterality Date    COLONOSCOPY  07/29/2022    COLONOSCOPY N/A 7/29/2022    Procedure: COLONOSCOPY;  Surgeon: Cj Reddy MD;  Location: [facility] ENDO;  Service: Endoscopy;  Laterality: N/A;    CYST REMOVAL N/A 12/22/2023    Procedure: EXCISION, CYST Perineum;  Surgeon: Donnie Andrade MD;  Location: [facility] OR;  Service: General;  Laterality: N/A;    NO PAST SURGERIES      SOFT TISSUE BIOPSY  07/06/2022    SOFT TISSUE BIOPSY N/A 07/06/2022    Procedure: BIOPSY, SOFT TISSUE, left  back, right  back, mid back R flank 5cm;  Surgeon: Jb Bell MD;  Location: [facility];  Service: General;  Laterality: N/A;         Family History   Family History   Problem Relation Name Age of Onset    Hypertension Father      Diabetes Mellitus Father      Heart disease Father      Cancer Neg Hx             Social History   .  Social History     Socioeconomic History    Marital status:    Tobacco Use    Smoking status: Every Day     Current packs/day: 0.80     Average packs/day: 0.8 packs/day for 46.9 years (37.5 ttl pk-yrs)     Types: Cigarettes     Start date: 1978    Smokeless tobacco: Never    Tobacco comments:     Enrolled in the [employer] on 7/1/22 ([employer] Member ID # [id number]). Pt is a 0.5 to 0.9 pk/day cigarette smoker x 47 yrs. Pt states ready to quit smoking. Ambulatory referral to Smoking Cessation clinic following hospital discharge.   Substance and Sexual Activity    Alcohol use: Never    Drug use: Never   Social History Narrative    From [place], came to [place] ago    Tobacco: Initiated at age 22 yo; 0.5 ppd at present     EtOH: None    Drug: None    Employment: ; will retire in 2023    Education: 8th grade     Lives with son, wife          Social Drivers of Health     Financial Resource Strain: Low Risk  (10/14/2024)    Overall Financial Resource Strain (CARDIA)     Difficulty of Paying Living Expenses: Not hard at all   Recent Concern: Financial Resource Strain - High Risk (10/14/2024)    Overall Financial Resource Strain (CARDIA)     Difficulty of Paying Living Expenses: Very hard   Food Insecurity: No Food Insecurity (10/14/2024)    Hunger Vital Sign     Worried About Running Out of Food in the Last Year: Never true     Ran Out of Food in the Last Year: Never true   Recent Concern: Food Insecurity - Food Insecurity Present (10/14/2024)    Hunger Vital Sign     Worried About Running Out of Food in the Last Year: Patient declined     Ran Out of Food in the Last  Year: Sometimes true   Transportation Needs: No Transportation Needs (10/14/2024)    TRANSPORTATION NEEDS     Transportation : No   Physical Activity: Inactive (10/14/2024)    Exercise Vital Sign     Days of Exercise per Week: 0 days     Minutes of Exercise per Session: 0 min   Stress: Stress Concern Present (10/14/2024)    [employer] - Occupational Stress Questionnaire     Feeling of Stress : To some extent   Housing Stability: Low Risk  (10/14/2024)    Housing Stability Vital Sign     Unable to Pay for Housing in the Last Year: No     Homeless in the Last Year: No         Allergies   Review of patient's allergies indicates:  No Known Allergies        Physical Exam     Vitals:    12/09/24 1107   BP: 110/71         Body mass index is 33.49 kg/m².      General: AOx3, NAD   Respiratory:  Symmetric chest rise, normal effort  Nose: No gross nasal septal deviation. Inferior Turbinates WNL bilaterally. No septal perforation. No masses/lesions.   Oral Cavity:  Oral Tongue mobile, no lesions noted. Hard Palate WNL. No buccal or FOM lesions.  Oropharynx:  No masses/lesions of the posterior pharyngeal wall. Tonsillar fossa without lesions. Soft palate without masses. Midline uvula.   Neck: No scars.  No cervical lymphadenopathy, thyromegaly or thyroid nodules.  Normal range of motion.    Face: House Brackmann I bilaterally.  Subtle fullness of left tail of parotid.      Assessment/Plan  Problem List Items Addressed This Visit          ENT    Parotid mass - Primary (Chronic)     Benign cystic lesion on fine-needle aspiration.  He is comfortable observing.  Repeat ultrasound in 2 months.         Relevant Orders    US Soft Tissue Head Neck

## 2024-12-09 NOTE — ASSESSMENT & PLAN NOTE
Benign cystic lesion on fine-needle aspiration.  He is comfortable observing.  Repeat ultrasound in 2 months.

## 2025-01-06 ENCOUNTER — TELEPHONE (OUTPATIENT)
Dept: ORTHOPEDICS | Facility: CLINIC | Age: 65
End: 2025-01-06
Payer: MEDICAID

## 2025-01-06 DIAGNOSIS — M79.642 PAIN IN BOTH HANDS: Primary | ICD-10-CM

## 2025-01-06 DIAGNOSIS — M79.641 PAIN IN BOTH HANDS: Primary | ICD-10-CM

## 2025-01-06 NOTE — TELEPHONE ENCOUNTER
----- Message from Dexter sent at 1/6/2025  1:16 PM CST -----  Pt Requesting Call Back    Who called: pt  Who called for pt:  Best call back #:  [phone]  Add notes: pt said he wants to know does he need to do an xray before his 1/23/25 appt or not

## 2025-01-24 ENCOUNTER — OFFICE VISIT (OUTPATIENT)
Dept: ORTHOPEDICS | Facility: CLINIC | Age: 65
End: 2025-01-24
Payer: MEDICAID

## 2025-01-24 VITALS
WEIGHT: 195.13 LBS | HEART RATE: 81 BPM | SYSTOLIC BLOOD PRESSURE: 108 MMHG | HEIGHT: 64 IN | DIASTOLIC BLOOD PRESSURE: 74 MMHG | BODY MASS INDEX: 33.31 KG/M2

## 2025-01-24 DIAGNOSIS — M65.332 ACQUIRED TRIGGER FINGER OF LEFT MIDDLE FINGER: Primary | ICD-10-CM

## 2025-01-24 DIAGNOSIS — M65.331 ACQUIRED TRIGGER FINGER OF RIGHT MIDDLE FINGER: ICD-10-CM

## 2025-01-24 PROCEDURE — 4010F ACE/ARB THERAPY RXD/TAKEN: CPT | Mod: CPTII,,,

## 2025-01-24 PROCEDURE — 1159F MED LIST DOCD IN RCRD: CPT | Mod: CPTII,,,

## 2025-01-24 PROCEDURE — 99203 OFFICE O/P NEW LOW 30 MIN: CPT | Mod: S$PBB,25,,

## 2025-01-24 PROCEDURE — 3074F SYST BP LT 130 MM HG: CPT | Mod: CPTII,,,

## 2025-01-24 PROCEDURE — 3078F DIAST BP <80 MM HG: CPT | Mod: CPTII,,,

## 2025-01-24 PROCEDURE — 20550 NJX 1 TENDON SHEATH/LIGAMENT: CPT | Mod: S$PBB,50,,

## 2025-01-24 PROCEDURE — 20550 NJX 1 TENDON SHEATH/LIGAMENT: CPT | Mod: PBBFAC,PN,LT

## 2025-01-24 PROCEDURE — 1160F RVW MEDS BY RX/DR IN RCRD: CPT | Mod: CPTII,,,

## 2025-01-24 PROCEDURE — 99999PBSHW PR PBB SHADOW TECHNICAL ONLY FILED TO HB: Mod: PBBFAC,,,

## 2025-01-24 PROCEDURE — 99999 PR PBB SHADOW E&M-EST. PATIENT-LVL V: CPT | Mod: PBBFAC,,,

## 2025-01-24 PROCEDURE — 20550 NJX 1 TENDON SHEATH/LIGAMENT: CPT | Mod: PBBFAC,PN,RT

## 2025-01-24 PROCEDURE — 3008F BODY MASS INDEX DOCD: CPT | Mod: CPTII,,,

## 2025-01-24 PROCEDURE — 99215 OFFICE O/P EST HI 40 MIN: CPT | Mod: PBBFAC,PN

## 2025-01-24 RX ORDER — DEXAMETHASONE SODIUM PHOSPHATE 4 MG/ML
4 INJECTION, SOLUTION INTRA-ARTICULAR; INTRALESIONAL; INTRAMUSCULAR; INTRAVENOUS; SOFT TISSUE
Status: COMPLETED | OUTPATIENT
Start: 2025-01-24 | End: 2025-01-24

## 2025-01-24 RX ADMIN — DEXAMETHASONE SODIUM PHOSPHATE 4 MG: 4 INJECTION, SOLUTION INTRA-ARTICULAR; INTRALESIONAL; INTRAMUSCULAR; INTRAVENOUS; SOFT TISSUE at 02:01

## 2025-01-24 NOTE — PROGRESS NOTES
SUBJECTIVE:      Chief Complaint: bilateral middle trigger finger    History of Present Illness:  Patient is a 64 y.o. left hand dominant male who presents today with complaints of bilateral middle trigger finger. States left more bothersome. No known JASS. Reports he did have CVA about 2 months ago and stroke worsened symptoms. But his symptoms have been present for about 1 year. Also stating left hand fingertips are numb. Stroke did affect left side. Denies surgery or injections to hands. Notes locking to b/l middle fingers. No DM.      The patient is a/an retired.    Onset of symptoms/DOI was 1 year prior.    Symptoms are aggravated by at night.    Symptoms are alleviated by none.    Symptoms consist of locking.    The patient rates their pain as a 4/10.    Attempted treatment(s) and/or interventions include activity modifications, rest.     The patient denies any fevers, chills, N/V, D/C and presents for evaluation.       Past Medical History:   Diagnosis Date    No known health problems      Past Surgical History:   Procedure Laterality Date    COLONOSCOPY  07/29/2022    COLONOSCOPY N/A 7/29/2022    Procedure: COLONOSCOPY;  Surgeon: Wilbert Jiménez MD;  Location: [facility];  Service: Endoscopy;  Laterality: N/A;    CYST REMOVAL N/A 12/22/2023    Procedure: EXCISION, CYST Perineum;  Surgeon: Jaun Clarke MD;  Location: [facility] OR;  Service: General;  Laterality: N/A;    NO PAST SURGERIES      SOFT TISSUE BIOPSY  07/06/2022    SOFT TISSUE BIOPSY N/A 07/06/2022    Procedure: BIOPSY, SOFT TISSUE, left  back, right back, mid back R flank 5cm;  Surgeon: Beny Cabezas MD;  [place] [facility] [place];  Service: General;  Laterality: N/A;     Review of patient's allergies indicates:  No Known Allergies  Social History     Social History Narrative    ** Merged History Encounter **         From [place], came to [place] ago  Tobacco: Initiated at age 22 yo; 0.5 ppd at present   EtOH: None  Drug:  None  Employment: ; will retire in 2023  Education: 8th grade   Lives with son, wife        Family History   Problem Relation Name Age of Onset    Hypertension Father dm     Diabetes Mellitus Father      Heart disease Father      Cancer Neg Hx      Hypertension Mother           Current Outpatient Medications:     acyclovir 5% (ZOVIRAX) 5 % ointment, Apply topically to the affected area every 4 hours, Disp: 30 g, Rfl: 0    clobetasoL (TEMOVATE) 0.05 % cream, Apply topically as needed., Disp: , Rfl:     clopidogreL (PLAVIX) 75 mg tablet, Take 1 tablet (75 mg total) by mouth once daily., Disp: 30 tablet, Rfl: 11    diclofenac sodium 100 mg 24 hr tablet, Take 100 mg by mouth every 8 (eight) hours., Disp: 21 tablet, Rfl: 0    fluticasone propionate (FLONASE) 50 mcg/actuation nasal spray, 1 spray by Each Nostril route., Disp: , Rfl:     gabapentin (NEURONTIN) 100 MG capsule, Take 100 mg by mouth once daily., Disp: , Rfl:     ketoconazole (NIZORAL) 2 % cream, , Disp: , Rfl:     levocetirizine (XYZAL) 5 MG tablet, , Disp: , Rfl:     losartan (COZAAR) 25 MG tablet, Take 25 mg by mouth once daily., Disp: , Rfl:     mupirocin (BACTROBAN) 2 % ointment, APPLY TO NAIL BED TWICE DAILY FOR ONE MONTH REPEAT AS NEEDED, Disp: , Rfl:     neomycin-polymyxin-dexamethasone (DEXACINE) 3.5 mg/g-10,000 unit/g-0.1 % Oint, , Disp: , Rfl:     oxybutynin (DITROPAN-XL) 10 MG 24 hr tablet, Take 10 mg by mouth once daily., Disp: , Rfl:     prednisoLONE acetate (PRED FORTE) 1 % DrpS, Place 1 drop into both eyes 3 (three) times daily., Disp: , Rfl:     triamcinolone acetonide 0.1% (KENALOG) 0.1 % cream, Apply topically 2 (two) times daily., Disp: 45 g, Rfl: 2    aspirin (ECOTRIN) 81 MG EC tablet, Take 1 tablet (81 mg total) by mouth once daily for 21 days, Disp: 21 tablet, Rfl: 0    atorvastatin (LIPITOR) 80 MG tablet, Take 1 tablet (80 mg total) by mouth once daily. (Patient not taking: Reported on 1/24/2025), Disp: 90 tablet, Rfl:  "3    azelastine (OPTIVAR) 0.05 % ophthalmic solution, Place 1 drop into both eyes 2 (two) times daily., Disp: 4 mL, Rfl: 11    nystatin (NYSTOP) powder, APPLY A THIN LAYER OF POWDER TOPICALLY TO AFFECTED AREA TWICE DAILY FOR 30 DAYS FOR [facility]A PEDIS AS A MAINTENANCE THERAPY. (Patient not taking: Reported on 1/24/2025), Disp: , Rfl:       Review of Systems:  Constitutional: no fever or chills  Eyes: no visual changes  ENT: no nasal congestion or sore throat  Respiratory: no cough or shortness of breath  Cardiovascular: no chest pain  Gastrointestinal: no nausea or vomiting, tolerating diet  Musculoskeletal: pain    OBJECTIVE:      Vital Signs (Most Recent):  Vitals:    01/24/25 1351   BP: 108/74   BP Location: Right arm   Patient Position: Sitting   Pulse: 81   Weight: 88.5 kg (195 lb 1.7 oz)   Height: 5' 4" (1.626 m)     Body mass index is 33.49 kg/m².      Physical Exam:  Constitutional: The patient appears well-developed and well-nourished. No distress.   Skin: No lesions appreciated  Head: Normocephalic and atraumatic.   Nose: Nose normal.   Ears: No deformities seen  Eyes: Conjunctivae and EOM are normal.   Neck: No tracheal deviation present.   Cardiovascular: Normal rate and intact distal pulses.    Pulmonary/Chest: Effort normal. No respiratory distress.   Abdominal: There is no guarding.   Neurological: The patient is alert.   Psychiatric: The patient has a normal mood and affect.     Bilateral Hand/Wrist Examination:    Observation/Inspection:  Swelling  none    Deformity  none  Discoloration  none     Scars   none    Atrophy  none    HAND/WRIST EXAMINATION:  Finkelstein's Test   Neg  WHAT Test    Neg  Snuff box tenderness   Neg  Nina's Test    Neg  Hook of Hamate Tenderness  Neg  CMC grind    Neg  Circumduction test   Neg  TTP     Bilateral A1 pulleys     Neurovascular Exam:  Digits WWP, brisk CR < 3s throughout  NVI motor/LTS to M/R/U nerves, radial pulse 2+  Tinel's Test - Carpal Tunnel  Neg  Tinel's " Test - Cubital Tunnel  Neg  Phalen's Test    Neg  Median Nerve Compression Test Neg  AIN Intact (O-Amanda), PIN Intact (Thumbs Up), Ulnar Intact (scissors)    ROM hand full, painless, trigger noted to both middle fingers    ROM wrist full, painless    ROM elbow full, painless    Abdomen not guarded  Respirations nonlabored  Perfusion intact    Diagnostic Results:     Imaging - I independently viewed the patient's imaging as well as the radiology report.  Xrays of the patient's b/l hands  demonstrate no evidence of any obvious acute fractures or dislocations or significant degenerative changes.    ASSESSMENT/PLAN:      1. Acquired trigger finger of left middle finger  dexAMETHasone injection 4 mg    Left trigger finger #1      2. Acquired trigger finger of right middle finger  dexAMETHasone injection 4 mg    Right middle trigger finger #1            I made the decision to obtain old records of the patient including previous notes and imaging. If new imaging was ordered today of the extremity or extremities evaluated. I independently reviewed and interpreted the xray as well as prior imaging. Reviewed imaging in detail with patient.     We discussed at length different treatment options including conservative vs surgical management. These include anti-inflammatories, acetaminophen, rest, ice, heat, formal physical therapy including strengthening and stretching exercises, home exercise programs, injections, and finally surgical intervention.      Patient here for bilateral middle finger pain.  We discussed trigger finger in detail.  We discussed treatment options including bracing/splinting, injections and trigger finger release.  Patient would like to try bilateral trigger finger injections.  Bilateral middle trigger finger injections given, post-injection instructions reviewed.  Discussed can repeat in 6-8 weeks if needed, discussed to per finger per lifetime then would recommend trigger finger release in the  OR.    Follow up:  Eight weeks  Xrays needed:  None     All of the patient's questions were answered and the patient will contact us if they have any questions or concerns in the interim.

## 2025-01-24 NOTE — PROCEDURES
Left trigger finger #1    Date/Time: 1/24/2025 2:00 PM    Performed by: Cheri Yancey PA-C  Authorized by: Cheri Yancey PA-C    Consent Done?:  Yes (Verbal)  Indications:  Pain  Site marked: the procedure site was marked    Timeout: prior to procedure the correct patient, procedure, and site was verified    Prep: patient was prepped and draped in usual sterile fashion      Local anesthesia used?: Yes    Local anesthetic:  Topical anesthetic and bupivacaine 0.25% without epinephrine  Anesthetic total (ml):  1    Location:  Thumb  Site:  L thumb flexor tendon sheath  Ultrasonic guidance for needle placement?: No    Needle size:  25 G  Approach:  Volar  Medications:  4 mg dexAMETHasone (DECADRON) injection 4 mg/mL  Patient tolerance:  Patient tolerated the procedure well with no immediate complications

## 2025-01-24 NOTE — PROCEDURES
Right middle trigger finger #1    Date/Time: 1/24/2025 2:00 PM    Performed by: Cheri Yancey PA-C  Authorized by: Cheri Yancey PA-C    Consent Done?:  Yes (Verbal)  Indications:  Pain  Site marked: the procedure site was marked    Timeout: prior to procedure the correct patient, procedure, and site was verified    Prep: patient was prepped and draped in usual sterile fashion      Local anesthesia used?: Yes    Local anesthetic:  Topical anesthetic and bupivacaine 0.25% without epinephrine  Anesthetic total (ml):  1    Location:  Thumb  Site:  R thumb flexor tendon sheath  Ultrasonic guidance for needle placement?: No    Needle size:  25 G  Approach:  Volar  Medications:  4 mg dexAMETHasone (DECADRON) injection 4 mg/mL  Patient tolerance:  Patient tolerated the procedure well with no immediate complications

## 2025-01-24 NOTE — PATIENT INSTRUCTIONS
You had a cortisone (steroid) injection today. There are two medicines in this injection, a numbing medicine (local anesthetic) and a steroid. The numbing medication component usually takes effect within a few minutes and wears off after a few hours, after which your pain may return. The steroid medication typically takes effect in about 7-14 days, although some people have benefits sooner.    There are risks with this procedure.   Any time the skin is punctured with a needle, there is a small risk of infection. With these injections that risk is less than 1 and 14,000. Corticosteroid injections can raise your blood pressure and blood sugar over the next few days.  Less than 1% of people experience of pain flare with the steroid, which usually goes away in 2-3 days.  2-3% of people developed a dimple or pale spot at the injection site, which is strictly cosmetic.

## 2025-02-16 ENCOUNTER — RESULTS FOLLOW-UP (OUTPATIENT)
Dept: OTOLARYNGOLOGY | Facility: CLINIC | Age: 65
End: 2025-02-16

## 2025-03-13 ENCOUNTER — RESULTS FOLLOW-UP (OUTPATIENT)
Dept: CARDIOLOGY | Facility: CLINIC | Age: 65
End: 2025-03-13

## 2025-03-13 ENCOUNTER — OFFICE VISIT (OUTPATIENT)
Dept: CARDIOLOGY | Facility: CLINIC | Age: 65
End: 2025-03-13
Payer: MEDICAID

## 2025-03-13 VITALS
DIASTOLIC BLOOD PRESSURE: 68 MMHG | WEIGHT: 195.13 LBS | OXYGEN SATURATION: 96 % | HEART RATE: 62 BPM | BODY MASS INDEX: 33.49 KG/M2 | SYSTOLIC BLOOD PRESSURE: 118 MMHG

## 2025-03-13 DIAGNOSIS — G47.33 OBSTRUCTIVE SLEEP APNEA: ICD-10-CM

## 2025-03-13 DIAGNOSIS — R07.9 CHEST PAIN, UNSPECIFIED TYPE: ICD-10-CM

## 2025-03-13 DIAGNOSIS — E78.5 HYPERLIPIDEMIA WITH TARGET LDL LESS THAN 70: ICD-10-CM

## 2025-03-13 DIAGNOSIS — I63.131 EMBOLIC STROKE INVOLVING RIGHT CAROTID ARTERY: Primary | ICD-10-CM

## 2025-03-13 PROCEDURE — 99999 PR PBB SHADOW E&M-EST. PATIENT-LVL IV: CPT | Mod: PBBFAC,,,

## 2025-03-13 PROCEDURE — 99214 OFFICE O/P EST MOD 30 MIN: CPT | Mod: PBBFAC,PN

## 2025-03-13 NOTE — PROGRESS NOTES
[facility] - Cardiology [facility] [address]  Cardiology Clinic Note      Chief Complaint  Chief Complaint   Patient presents with    Follow-up     CVA a couple of months ago       HPI:  Mr. Estevez is a 65 y/o male with a PMHx of former tobacco use, embolic stroke involving right carotid artery, parotid mass, HLD, HTN, CONCHITA    Patient is new to me and here to establish care  Appreciate referral from neurology  Recent CVA from November 2024 and he has since stopped smoking and increased activities for healthy weight management  Neuro placed patient on DAPT indefinitely and statin  Also cardiac device shows no evidence for AFIB; may need loop recorder; EP following  Wears CPAP nightly for CONCHITA  Today he is asymptomatic from a cardiac standpoint    EKG NSR heart rate 70s    Medications  Current Medications[1]     History  Past Medical History:   Diagnosis Date    No known health problems      Past Surgical History:   Procedure Laterality Date    COLONOSCOPY  07/29/2022    COLONOSCOPY N/A 7/29/2022    Procedure: COLONOSCOPY;  Surgeon: Wilbert Jiménez MD;  Location: [facility];  Service: Endoscopy;  Laterality: N/A;    CYST REMOVAL N/A 12/22/2023    Procedure: EXCISION, CYST Perineum;  Surgeon: Jaun Clarke MD;  Location: [facility] OR;  Service: General;  Laterality: N/A;    NO PAST SURGERIES      SOFT TISSUE BIOPSY  07/06/2022    SOFT TISSUE BIOPSY N/A 07/06/2022    Procedure: BIOPSY, SOFT TISSUE, left  back, right back, mid back R flank 5cm;  Surgeon: Beny Cabezas MD;  Location: [facility];  Service: General;  Laterality: N/A;     Social History[2]  Family History   Problem Relation Name Age of Onset    Hypertension Father dm     Diabetes Mellitus Father      Heart disease Father      Cancer Neg Hx      Hypertension Mother          Allergies  Review of patient's allergies indicates:  No Known Allergies    Review of Systems   Review of Systems   Constitutional: Negative for chills, decreased appetite, diaphoresis, fever,  malaise/fatigue, weight gain and weight loss.   Eyes:  Negative for blurred vision.   Cardiovascular:  Negative for chest pain, claudication, dyspnea on exertion, irregular heartbeat, leg swelling, near-syncope, orthopnea, palpitations, paroxysmal nocturnal dyspnea and syncope.   Respiratory:  Negative for cough, shortness of breath, snoring, sputum production and wheezing.    Endocrine: Negative for cold intolerance, heat intolerance, polydipsia, polyphagia and polyuria.   Skin:  Negative for color change, dry skin, itching, nail changes and poor wound healing.   Musculoskeletal:  Negative for back pain, gout, joint pain and joint swelling.   Gastrointestinal:  Negative for bloating, abdominal pain, constipation, diarrhea, hematemesis, hematochezia, melena, nausea and vomiting.   Genitourinary:  Negative for dysuria and hematuria.   Neurological:  Negative for dizziness, headaches, light-headedness, numbness, paresthesias and weakness.   Psychiatric/Behavioral:  Negative for altered mental status, depression and memory loss.        Physical Exam  Vitals:    03/13/25 1418   BP: 118/68   Pulse: 62     Wt Readings from Last 1 Encounters:   03/13/25 88.5 kg (195 lb 1.7 oz)     Physical Exam  HENT:      Head: Normocephalic and atraumatic.      Mouth/Throat:      Mouth: Mucous membranes are moist.   Eyes:      Extraocular Movements: Extraocular movements intact.      Pupils: Pupils are equal, round, and reactive to light.   Neck:      Vascular: No carotid bruit.   Cardiovascular:      Heart sounds: No murmur heard.     No friction rub. No gallop.   Pulmonary:      Effort: Pulmonary effort is normal. No respiratory distress.   Abdominal:      General: Abdomen is flat.      Palpations: Abdomen is soft.   Musculoskeletal:      Right lower leg: No edema.      Left lower leg: No edema.   Skin:     General: Skin is warm.      Capillary Refill: Capillary refill takes less than 2 seconds.   Neurological:      General: No focal  deficit present.      Mental Status: He is alert.   Psychiatric:         Mood and Affect: Mood normal.         Labs  Lab Visit on 03/13/2025   Component Date Value Ref Range Status    Cholesterol 03/13/2025 109 (L)  120 - 199 mg/dL Final    Comment: The National Cholesterol Education Program (NCEP) has set the  following guidelines (reference ranges) for Cholesterol:  Optimal.....................<200 mg/dL  Borderline High.............200-239 mg/dL  High........................> or = 240 mg/dL      Triglycerides 03/13/2025 62  30 - 150 mg/dL Final    Comment: The National Cholesterol Education Program (NCEP) has set the  following guidelines (reference values) for triglycerides:  Normal......................<150 mg/dL  Borderline High.............150-199 mg/dL  High........................200-499 mg/dL      HDL 03/13/2025 26 (L)  40 - 75 mg/dL Final    Comment: The National Cholesterol Education Program (NCEP) has set the  following guidelines (reference values) for HDL Cholesterol:  Low...............<40 mg/dL  Optimal...........>60 mg/dL      LDL Cholesterol 03/13/2025 70.6  63.0 - 159.0 mg/dL Final    Comment: The National Cholesterol Education Program (NCEP) has set the  following guidelines (reference values) for LDL Cholesterol:  Optimal.......................<130 mg/dL  Borderline High...............130-159 mg/dL  High..........................160-189 mg/dL  Very High.....................>190 mg/dL      HDL/Cholesterol Ratio 03/13/2025 23.9  20.0 - 50.0 % Final    Total Cholesterol/HDL Ratio 03/13/2025 4.2  2.0 - 5.0 Final    Non-HDL Cholesterol 03/13/2025 83  mg/dL Final    Comment: Risk category and Non-HDL cholesterol goals:  Coronary heart disease (CHD)or equivalent (10-year risk of CHD >20%):  Non-HDL cholesterol goal     <130 mg/dL  Two or more CHD risk factors and 10-year risk of CHD <= 20%:  Non-HDL cholesterol goal     <160 mg/dL  0 to 1 CHD risk factor:  Non-HDL cholesterol goal     <190 mg/dL      Clinical Support on 12/03/2024   Component Date Value Ref Range Status    Sinus min HR 01/03/2025 49   Final    Sinus max hr 01/03/2025 131   Final    Sinus avg hr 01/03/2025 71   Final   Hospital Outpatient Visit on 11/14/2024   Component Date Value Ref Range Status    Final Pathologic Diagnosis 11/14/2024    Final                    Value:Left parotid mass, fine needle aspiration:     Negative for malignant cells.  Favor Cystic lesion.       Interp By Arsenio Ortega M.D., Signed on 11/18/2024 at 15:49    Disclaimer 11/14/2024 Screening was performed at [facility], [address], [place] [address].   Final   No results displayed because visit has over 200 results.          EKG  Reviewed    Echo   Results for orders placed or performed during the hospital encounter of 10/14/24   Echo   Result Value Ref Range    BSA 1.97 m2    Johnson's Biplane MOD Ejection Fraction 64 %    A2C EF 69 %    A4C EF 58 %    LVOT stroke volume 61.2 cm3    LVIDd 4.0 3.5 - 6.0 cm    LV Systolic Volume 30.80 mL    LV Systolic Volume Index 16.1 mL/m2    LVIDs 2.9 2.1 - 4.0 cm    LV ESV A2C 50.06 mL    LV Diastolic Volume 70.29 mL    LV ESV A4C 26.36 mL    LV Diastolic Volume Index 36.80 mL/m2    LV EDV A2C 44.374117027419895 mL    LV EDV A4C 44.46 mL    Left Ventricular End Systolic Volume by Teichholz Method 30.80 mL    Left Ventricular End Diastolic Volume by Teichholz Method 70.29 mL    IVS 0.8 0.6 - 1.1 cm    LVOT diameter 2.0 cm    LVOT area 3.1 cm2    FS 27.5 (A) 28 - 44 %    Left Ventricle Relative Wall Thickness 0.55 cm    PW 1.1 0.6 - 1.1 cm    LV mass 118.2 g    LV Mass Index 61.9 g/m2    MV Peak E Galo 0.90 m/s    TDI LATERAL 0.08 m/s    TDI SEPTAL 0.06 m/s    E/E' ratio 12.86 m/s    MV Peak A Galo 0.92 m/s    TR Max Galo 1.89 m/s    E/A ratio 0.98     E wave deceleration time 150.16 msec    LV SEPTAL E/E' RATIO 15.00 m/s    LV LATERAL E/E' RATIO 11.25 m/s    PV Peak S Galo 0.43  m/s    PV Peak D Galo 0.32 m/s    Pulm vein S/D ratio 1.34     LVOT peak galo 0.9 m/s    Left Ventricular Outflow Tract Mean Velocity 0.68 cm/s    Left Ventricular Outflow Tract Mean Gradient 1.97 mmHg    RV- meneses basal diam 3.4 cm    RV S' 11.42 cm/s    TAPSE 2.30 cm    RV/LV Ratio 0.85 cm    LA size 3.07 cm    Left Atrium Minor Axis 1.75 cm    LA Vol (MOD) 36.80 mL    SARA (MOD) 19.3 mL/m2    RA area length vol 28.70 mL    RA Area 12.6 cm2    RA Vol 30.15 mL    AV mean gradient 4.0 mmHg    AV peak gradient 7.8 mmHg    Ao peak galo 1.4 m/s    Ao VTI 25.5 cm    LVOT peak VTI 19.5 cm    AV valve area 2.4 cm²    AV Velocity Ratio 0.64     AV index (prosthetic) 0.76     YAKOV by Velocity Ratio 2.0 cm²    MV peak gradient 3 mmHg    MV stenosis pressure 1/2 time 43.55 ms    MV valve area p 1/2 method 5.05 cm2    MV valve area by continuity eq 2.31 cm2    MV VTI 26.5 cm    Triscuspid Valve Regurgitation Peak Gradient 14 mmHg    PV PEAK VELOCITY 0.97 m/s    PV peak gradient 4 mmHg    Sinus 2.59 cm    Mean e' 0.07 m/s    ZLVIDS -1.05     ZLVIDD -2.95     LA area A4C 12.40 cm2    LA area A2C 17.68 cm2    TV resting pulmonary artery pressure 17 mmHg    RV TB RVSP 5 mmHg    Est. RA pres 3 mmHg    Narrative      Left Ventricle: The left ventricle is normal in size. There is normal   systolic function with a visually estimated ejection fraction of 55 - 60%.   There is normal diastolic function.    Right Ventricle: Normal right ventricular cavity size. Systolic   function is normal.    Pulmonary Artery: No pulmonary hypertension. The estimated pulmonary   artery systolic pressure is 17 mmHg.    IVC/SVC: Normal venous pressure at 3 mmHg.    Negative bubble study         Imaging  No results found.    Prior coronary angiogram / intervention:  No priors    Assessment and Plan  Mr. Estevez is a 65 y/o male with a PMHx of former tobacco use, embolic stroke involving right carotid artery, parotid mass, HLD, HTN, CONCHITA    Patient is new to me  and here to establish care  Appreciate referral from neurology  Recent CVA from November 2024 and he has since stopped smoking and increased activities for healthy weight management  Neuro placed patient on DAPT indefinitely and statin  Also cardiac device shows no evidence for AFIB; may need loop recorder; EP following  Wears CPAP nightly for CONCHITA  Today he is asymptomatic from a cardiac standpoint including CP    EKG NSR heart rate 70s    Embolic stroke involving right carotid artery  DAPT and statin indefinitely  LDL goal <70; repeat lipid panel, can consider adding Zetia depending on results  May need loop recorder; given no evidence of afib on cardiac event monitor  Neuro and EP following  Educated on the importance of continued smoking cessation  Diet: Discussed Mediterranean Diet recommendations (Adopted from Andres et al, [facility], 2018.)  - Eat primarily plant-based foods, such as fruits and vegetables, whole grains, legumes (beans) and nuts  - Limit refined carbohydrates (white pasta, bread, rice).  - Replace butter with healthy fats such as olive oil.  - Use herbs and spices instead of salt to flavor foods.  - Limit red meat and processed meats to no more than a few times a month.  - Avoid sugary sodas, bakery goods, and sweets.  - Eat fish and poultry at least twice a week.              - Get plenty of exercise (150 minutes per week).     HTN   BP good today  Long term goal is normotension w/ target BP of less than 130/80 mmHg given the above  Continue losartan 25mg daily     Hyperlipidemia with target LDL less than 70  Continue statin  - LIPID PANEL; Future    Obstructive sleep apnea  Continue nightly CPAP    Follow Up  Follow up in about 6 months (around 9/13/2025), or if symptoms worsen or fail to improve.       Jenny Garcia, FNP-C [facility] - Cardiology    Total professional time spent for the encounter: 45 minutes  Time was spent preparing to see the patient, reviewing results of prior testing,  obtaining and/or reviewing separately obtained history, performing a medically appropriate examination and interview, counseling and educating the patient/family, ordering medications/tests/procedures, referring and communicating with other health care professionals, documenting clinical information in the electronic health record, and independently interpreting results.    Disclaimer: This document was created using voice recognition software (Finestrella). Although it may be edited, this document may contain errors related to incorrect recognition of the spoken word. Please call the physician if clarification is needed.        [1]   Current Outpatient Medications   Medication Sig Dispense Refill    aspirin (ECOTRIN) 81 MG EC tablet Take 1 tablet (81 mg total) by mouth once daily for 21 days 21 tablet 0    atorvastatin (LIPITOR) 80 MG tablet Take 1 tablet (80 mg total) by mouth once daily. 90 tablet 3    clopidogreL (PLAVIX) 75 mg tablet Take 1 tablet (75 mg total) by mouth once daily. 30 tablet 11    gabapentin (NEURONTIN) 100 MG capsule Take 100 mg by mouth once daily.      losartan (COZAAR) 25 MG tablet Take 25 mg by mouth once daily.      oxybutynin (DITROPAN-XL) 10 MG 24 hr tablet Take 10 mg by mouth once daily.      acyclovir 5% (ZOVIRAX) 5 % ointment Apply topically to the affected area every 4 hours 30 g 0    azelastine (OPTIVAR) 0.05 % ophthalmic solution Place 1 drop into both eyes 2 (two) times daily. 4 mL 11    clobetasoL (TEMOVATE) 0.05 % cream Apply topically as needed.      diclofenac sodium 100 mg 24 hr tablet Take 100 mg by mouth every 8 (eight) hours. 21 tablet 0    fluticasone propionate (FLONASE) 50 mcg/actuation nasal spray 1 spray by Each Nostril route.      ketoconazole (NIZORAL) 2 % cream       levocetirizine (XYZAL) 5 MG tablet       mupirocin (BACTROBAN) 2 % ointment APPLY TO NAIL BED TWICE DAILY FOR ONE MONTH REPEAT AS NEEDED      neomycin-polymyxin-dexamethasone (DEXACINE) 3.5  mg/g-10,000 unit/g-0.1 % Oint       nystatin (NYSTOP) powder APPLY A THIN LAYER OF POWDER TOPICALLY TO AFFECTED AREA TWICE DAILY FOR 30 DAYS FOR TINEA PEDIS AS A MAINTENANCE THERAPY. (Patient not taking: Reported on 1/24/2025)      prednisoLONE acetate (PRED FORTE) 1 % DrpS Place 1 drop into both eyes 3 (three) times daily.      triamcinolone acetonide 0.1% (KENALOG) 0.1 % cream Apply topically 2 (two) times daily. 45 g 2     No current facility-administered medications for this visit.   [2]   Social History  Socioeconomic History    Marital status:    Tobacco Use    Smoking status: Former     Current packs/day: 0.80     Average packs/day: 0.8 packs/day for 47.2 years (37.8 ttl pk-yrs)     Types: Cigarettes     Start date: 1978    Smokeless tobacco: Never    Tobacco comments:     Enrolled in the [employer] on 7/1/22 ([facility] Member ID # [id number]). Pt is a 0.5 to 0.9 pk/day cigarette smoker x 47 yrs. Pt states ready to quit smoking. Ambulatory referral to Smoking Cessation clinic following hospital discharge.   Substance and Sexual Activity    Alcohol use: Never    Drug use: Never    Sexual activity: Yes     Partners: Female   Social History Narrative    ** Merged History Encounter **         From [place], came to [place]  Tobacco: Initiated at age 22 yo; 0.5 ppd at present   EtOH: None  Drug: None  Employment: ; will retire in 2023  Education: 8th grade   Lives with son, wife        Social Drivers of Health     Financial Resource Strain: Low Risk  (10/14/2024)    Overall Financial Resource Strain (CARDIA)     Difficulty of Paying Living Expenses: Not hard at all   Food Insecurity: No Food Insecurity (10/14/2024)    Hunger Vital Sign     Worried About Running Out of Food in the Last Year: Never true     Ran Out of Food in the Last Year: Never true   Transportation Needs: No Transportation Needs (10/14/2024)    TRANSPORTATION NEEDS     Transportation : No   Physical Activity:  Inactive (10/14/2024)    Exercise Vital Sign     Days of Exercise per Week: 0 days     Minutes of Exercise per Session: 0 min   Stress: Stress Concern Present (10/14/2024)    [employer] - Occupational Stress Questionnaire     Feeling of Stress : To some extent   Housing Stability: Unknown (10/14/2024)    Housing Stability Vital Sign     Unable to Pay for Housing in the Last Year: No     Homeless in the Last Year: No

## 2025-03-14 PROBLEM — S91.209A TOENAIL AVULSION: Status: ACTIVE | Noted: 2025-03-14
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
## 2025-03-25 ENCOUNTER — OFFICE VISIT (OUTPATIENT)
Dept: ORTHOPEDICS | Facility: CLINIC | Age: 65
End: 2025-03-25
Payer: MEDICAID

## 2025-03-25 VITALS
WEIGHT: 195.56 LBS | HEART RATE: 60 BPM | SYSTOLIC BLOOD PRESSURE: 101 MMHG | DIASTOLIC BLOOD PRESSURE: 67 MMHG | HEIGHT: 64 IN | BODY MASS INDEX: 33.38 KG/M2

## 2025-03-25 DIAGNOSIS — M65.332 ACQUIRED TRIGGER FINGER OF LEFT MIDDLE FINGER: Primary | ICD-10-CM

## 2025-03-25 PROCEDURE — 4010F ACE/ARB THERAPY RXD/TAKEN: CPT | Mod: CPTII,,,

## 2025-03-25 PROCEDURE — 3008F BODY MASS INDEX DOCD: CPT | Mod: CPTII,,,

## 2025-03-25 PROCEDURE — 1160F RVW MEDS BY RX/DR IN RCRD: CPT | Mod: CPTII,,,

## 2025-03-25 PROCEDURE — 3078F DIAST BP <80 MM HG: CPT | Mod: CPTII,,,

## 2025-03-25 PROCEDURE — 99213 OFFICE O/P EST LOW 20 MIN: CPT | Mod: S$PBB,,,

## 2025-03-25 PROCEDURE — 99999 PR PBB SHADOW E&M-EST. PATIENT-LVL IV: CPT | Mod: PBBFAC,,,

## 2025-03-25 PROCEDURE — 99214 OFFICE O/P EST MOD 30 MIN: CPT | Mod: PBBFAC,PN

## 2025-03-25 PROCEDURE — 1159F MED LIST DOCD IN RCRD: CPT | Mod: CPTII,,,

## 2025-03-25 PROCEDURE — 3074F SYST BP LT 130 MM HG: CPT | Mod: CPTII,,,

## 2025-03-25 NOTE — PROGRESS NOTES
SUBJECTIVE:      Chief Complaint: bilateral middle trigger finger    History of Present Illness:  Patient is a 64 y.o. left hand dominant male who presents today with complaints of bilateral middle trigger finger. States left more bothersome. No known JASS. Reports he did have CVA about 2 months ago and stroke worsened symptoms. But his symptoms have been present for about 1 year. Also stating left hand fingertips are numb. Stroke did affect left side. Denies surgery or injections to hands. Notes locking to b/l middle fingers. No DM.      The patient is a/an retired.    Onset of symptoms/DOI was 1 year prior.    Symptoms are aggravated by at night.    Symptoms are alleviated by none.    Symptoms consist of locking.    The patient rates their pain as a 4/10.    Attempted treatment(s) and/or interventions include activity modifications, rest.     The patient denies any fevers, chills, N/V, D/C and presents for evaluation.    ____________________________________________________________________    Interval history 3/25/2025 : Patient returns today for follow up of bilateral middle trigger finger.  States injections did not help. Notes active triggering. Left is more bothersome. Not super painful.          Past Medical History:   Diagnosis Date    No known health problems      Past Surgical History:   Procedure Laterality Date    COLONOSCOPY  07/29/2022    COLONOSCOPY N/A 7/29/2022    Procedure: COLONOSCOPY;  Surgeon: Wilbert Jiménez MD;  Location: [facility];  Service: Endoscopy;  Laterality: N/A;    CYST REMOVAL N/A 12/22/2023    Procedure: EXCISION, CYST Perineum;  Surgeon: Jaun Clarke MD;  Location: [facility] OR;  Service: General;  Laterality: N/A;    NO PAST SURGERIES      SOFT TISSUE BIOPSY  07/06/2022    SOFT TISSUE BIOPSY N/A 07/06/2022    Procedure: BIOPSY, SOFT TISSUE, left  back, right back, mid back R flank 5cm;  Surgeon: Beny Cabezas MD;  Location[facility] OR;  Service: General;  Laterality:  N/A;     Review of patient's allergies indicates:  No Known Allergies  Social History     Social History Narrative    ** Merged History Encounter **         From [place], came [place]  Tobacco: Initiated at age 22 yo; 0.5 ppd at present   EtOH: None  Drug: None  Employment: ; will retire in 2023  Education: 8th grade   Lives with son, wife        Family History   Problem Relation Name Age of Onset    Hypertension Father dm     Diabetes Mellitus Father      Heart disease Father      Cancer Neg Hx      Hypertension Mother           Current Outpatient Medications:     acyclovir 5% (ZOVIRAX) 5 % ointment, Apply topically to the affected area every 4 hours, Disp: 30 g, Rfl: 0    atorvastatin (LIPITOR) 80 MG tablet, Take 1 tablet (80 mg total) by mouth once daily., Disp: 90 tablet, Rfl: 3    clobetasoL (TEMOVATE) 0.05 % cream, Apply topically as needed., Disp: , Rfl:     clopidogreL (PLAVIX) 75 mg tablet, Take 1 tablet (75 mg total) by mouth once daily., Disp: 30 tablet, Rfl: 11    diclofenac sodium 100 mg 24 hr tablet, Take 100 mg by mouth every 8 (eight) hours., Disp: 21 tablet, Rfl: 0    fluticasone propionate (FLONASE) 50 mcg/actuation nasal spray, 1 spray by Each Nostril route., Disp: , Rfl:     gabapentin (NEURONTIN) 100 MG capsule, Take 100 mg by mouth once daily., Disp: , Rfl:     ketoconazole (NIZORAL) 2 % cream, , Disp: , Rfl:     levocetirizine (XYZAL) 5 MG tablet, , Disp: , Rfl:     losartan (COZAAR) 25 MG tablet, Take 25 mg by mouth once daily., Disp: , Rfl:     mupirocin (BACTROBAN) 2 % ointment, APPLY TO NAIL BED TWICE DAILY FOR ONE MONTH REPEAT AS NEEDED, Disp: , Rfl:     neomycin-polymyxin-dexamethasone (DEXACINE) 3.5 mg/g-10,000 unit/g-0.1 % Oint, , Disp: , Rfl:     nystatin (NYSTOP) powder, , Disp: , Rfl:     oxybutynin (DITROPAN-XL) 10 MG 24 hr tablet, Take 10 mg by mouth once daily., Disp: , Rfl:     prednisoLONE acetate (PRED FORTE) 1 % DrpS, Place 1 drop into both eyes 3  "(three) times daily., Disp: , Rfl:     triamcinolone acetonide 0.1% (KENALOG) 0.1 % cream, Apply topically 2 (two) times daily., Disp: 45 g, Rfl: 2    aspirin (ECOTRIN) 81 MG EC tablet, Take 1 tablet (81 mg total) by mouth once daily for 21 days, Disp: 21 tablet, Rfl: 0    azelastine (OPTIVAR) 0.05 % ophthalmic solution, Place 1 drop into both eyes 2 (two) times daily., Disp: 4 mL, Rfl: 11      Review of Systems:  Constitutional: no fever or chills  Eyes: no visual changes  ENT: no nasal congestion or sore throat  Respiratory: no cough or shortness of breath  Cardiovascular: no chest pain  Gastrointestinal: no nausea or vomiting, tolerating diet  Musculoskeletal: pain    OBJECTIVE:      Vital Signs (Most Recent):  Vitals:    03/25/25 1257   BP: 101/67   Pulse: 60   Weight: 88.7 kg (195 lb 8.8 oz)   Height: 5' 4" (1.626 m)     Body mass index is 33.57 kg/m².      Physical Exam:  Constitutional: The patient appears well-developed and well-nourished. No distress.   Skin: No lesions appreciated  Head: Normocephalic and atraumatic.   Nose: Nose normal.   Ears: No deformities seen  Eyes: Conjunctivae and EOM are normal.   Neck: No tracheal deviation present.   Cardiovascular: Normal rate and intact distal pulses.    Pulmonary/Chest: Effort normal. No respiratory distress.   Abdominal: There is no guarding.   Neurological: The patient is alert.   Psychiatric: The patient has a normal mood and affect.     Bilateral Hand/Wrist Examination:    Observation/Inspection:  Swelling  none    Deformity  none  Discoloration  none     Scars   none    Atrophy  none    HAND/WRIST EXAMINATION:  Finkelstein's Test   Neg  WHAT Test    Neg  Snuff box tenderness   Neg  Nina's Test    Neg  Hook of Hamate Tenderness  Neg  CMC grind    Neg  Circumduction test   Neg  TTP     Bilateral A1 pulleys     Neurovascular Exam:  Digits WWP, brisk CR < 3s throughout  NVI motor/LTS to M/R/U nerves, radial pulse 2+  Tinel's Test - Carpal " Tunnel  Neg  Tinel's Test - Cubital Tunnel  Neg  Phalen's Test    Neg  Median Nerve Compression Test Neg  AIN Intact (O-Amanda), PIN Intact (Thumbs Up), Ulnar Intact (scissors)    ROM hand full, painless, trigger noted to both middle fingers in clinic    ROM wrist full, painless    ROM elbow full, painless    Abdomen not guarded  Respirations nonlabored  Perfusion intact    Diagnostic Results:     Imaging - I independently viewed the patient's imaging as well as the radiology report.  Xrays of the patient's b/l hands  demonstrate no evidence of any obvious acute fractures or dislocations or significant degenerative changes.    ASSESSMENT/PLAN:      1. Acquired trigger finger of left middle finger          Patient here for follow up of bilateral middle trigger finger.  His left is more symptomatic and bothersome.  He would be a perfect candidate for trigger finger release.  However unfortunately he sustained a stroke about 4 months ago.  He is on aspirin and Plavix.  Discussed with him typically you have to be on these medications for 9 months until they can be discontinued before surgery.  He is seeing a new neurologist in April.  After this appointment, we will message neurologist to see if you would be able to discontinue the blood thinners preoperatively.  If not we will have to wait until he is 9 months out from stroke and get approval from neurologist before proceeding with left A1 pulley release.  Recommend oval 8 splints  We will plan to touch base with neurologist after appointment April 14th to see if he would be able to discontinue aspirin/Plavix for trigger finger release under MAC/general anesthesia    Follow up:  prnding neuro  Xrays needed:  None     All of the patient's questions were answered and the patient will contact us if they have any questions or concerns in the interim.

## 2025-04-10 NOTE — PROGRESS NOTES
[facility]  Clinic Note    Date of Service: 4/14/2025  Patient seen at the request of: Larisa Iyer MD    Reason for Consultation  Stroke    Assessment:  Jaun Estevez is a 64 y.o. male who presents with     Patient presents for follow up after an embolic-appearing right frontal lobe infarct with residual left side droop, left finger numbness, and some dysphagia.  CTA was significant for a fully occluded right internal carotid artery.  We will continue on dual antiplatelet therapy indefinitely.    Patient reports that he has been trying to become more active since the stroke.  Patient also stopped smoking one month ago after a 40 year smoking history.  Patient followed by cardiology.    Telemetry - There were no atrial or ventricular arrhythmias.     Patient is still abstaining from smoking.  Cholesterol is improved.        Plan:    - continue dual antiplatelet therapy with aspirin 81 mg and Plavix 75 mg daily  - atorvastatin 80 mg daily   - continue with CPAP at night   - continue physical therapy      A dictation device was used to produce this document. Use of such devices sometimes results in grammatical errors or replacement of words that sound similarly.     Signed:    Pietro Johnson MD  Neurology/Epilepsy  04/14/2025 8:18 AM              HPI:  Jaun Estevez is a 64 y.o. male with   Past Medical History:   Diagnosis Date    No known health problems      Patient reports getting a chest pain associated with nausea and mouth watering while he was walking in the park.  This did not progress so he did not present with the emergency room.     He reports some persistent dysphagia.  Patient reports that speech is back to baseline.  Patient reports persistent numbness in the left fingers.       Patient stopped smoking (40 year smoking history) for the last year.      Per neurology note:  Relevant HPI:  Per chart review patient presented to [facility] on 10/14/2024 with 1-2 weeks of left upper extremity  numbness tingling with 3 day history of left facial droop, dysarthria and dysphagia.  MRI of the brain indicated scattered foci of diffuse restriction in the right cerebral hemisphere.  CTA head and neck occlusion of the right ICA with reconstitution of the upper carotid siphon near the ophthalmic division.     Interval history:    At the time of today's visit, the patient denies new or worsening focal neurologic symptoms concerning for new stroke or TIA. He is doing well overall with gradual improvements . Patient reports left facial droop , dysarthria and numbness and tingling to LUE. He denies associated vertigo, double vision,  gait imbalance,  language or visual disturbances. He is independent with ADL's. He is not currently participating in outpatient therapy. He is adherent with home medications.    MRI brain 10/14/24:  Scattered foci of diffusion restriction in the right cerebral hemisphere, suggestive of acute embolic infarctions in the right cerebral hemisphere predominantly within the anterior circulation.    No evidence of hemorrhage.  Absence of the normal flow void within the right ICA.  A more proximal right ICA occlusion is suspected.    CTA of the head and neck is recommended for further evaluation.  Vessel Imaging   CTA head and neck 10/14/24:  Occlusion of the right internal carotid artery with reconstitution of the upper carotid siphon near the ophthalmic division.  No string sign identified.  No intracranial stenosis or occlusion.  No evidence of infarct or enhancing intracranial mass.  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  It appears well encapsulated.  Differential includes benign and malignant parotid tumors.  ENT consultation advised.  Cardiac Imaging   TTE  10/14/24:  Left Ventricle: The left ventricle is normal in size. There is normal systolic function with a visually estimated ejection fraction of 55 - 60%. There is normal diastolic function.  Right  Ventricle: Normal right ventricular cavity size. Systolic function is normal.  Pulmonary Artery: No pulmonary hypertension. The estimated pulmonary artery systolic pressure is 17 mmHg.  IVC/SVC: Normal venous pressure at 3 mmHg.  Negative bubble study      This is the extent of the patient's complaints at this time.    TSH   Date Value Ref Range Status   10/14/2024 0.791 0.400 - 4.000 uIU/mL Final   11/13/2023 0.607 0.400 - 4.000 uIU/mL Final     Vitamin B-12   Date Value Ref Range Status   11/13/2023 474 210 - 950 pg/mL Final     Hemoglobin A1C   Date Value Ref Range Status   10/14/2024 5.9 (H) 4.0 - 5.6 % Final     Comment:     ADA Screening Guidelines:  5.7-6.4%  Consistent with prediabetes  >or=6.5%  Consistent with diabetes    High levels of fetal hemoglobin interfere with the HbA1C  assay. Heterozygous hemoglobin variants (HbS, HgC, etc)do  not significantly interfere with this assay.   However, presence of multiple variants may affect accuracy.     11/13/2023 5.5 4.0 - 5.6 % Final     Comment:     ADA Screening Guidelines:  5.7-6.4%  Consistent with prediabetes  >or=6.5%  Consistent with diabetes    High levels of fetal hemoglobin interfere with the HbA1C  assay. Heterozygous hemoglobin variants (HbS, HgC, etc)do  not significantly interfere with this assay.   However, presence of multiple variants may affect accuracy.           Review of Systems:  ROS negative unless noted in HPI    Past Surgical History:  Past Surgical History:   Procedure Laterality Date    COLONOSCOPY  07/29/2022    COLONOSCOPY N/A 7/29/2022    Procedure: COLONOSCOPY;  Surgeon: Wilbert Jiménez MD;  [facility] ENDO;  Service: Endoscopy;  Laterality: N/A;    CYST REMOVAL N/A 12/22/2023    Procedure: EXCISION, CYST Perineum;  Surgeon: Jaun Clarke MD;  Location: [facility] OR;  Service: General;  Laterality: N/A;    NO PAST SURGERIES      SOFT TISSUE BIOPSY  07/06/2022    SOFT TISSUE BIOPSY N/A 07/06/2022    Procedure: BIOPSY, SOFT  TISSUE, left  back, right back, mid back R flank 5cm;  Surgeon: Beny Cabezas MD;  Location: [facility] OR;  Service: General;  Laterality: N/A;       Family History:  Family History   Problem Relation Name Age of Onset    Hypertension Father dm     Diabetes Mellitus Father      Heart disease Father      Cancer Neg Hx      Hypertension Mother         Social History:  Social History     Tobacco Use    Smoking status: Former     Current packs/day: 0.80     Average packs/day: 0.8 packs/day for 47.3 years (37.8 ttl pk-yrs)     Types: Cigarettes     Start date: 1978    Smokeless tobacco: Never    Tobacco comments:     Enrolled in the [employer] on 7/1/22 ([employer] Member ID # [id number]). Pt is a 0.5 to 0.9 pk/day cigarette smoker x 47 yrs. Pt states ready to quit smoking. Ambulatory referral to Smoking Cessation clinic following hospital discharge.   Substance Use Topics    Alcohol use: Never    Drug use: Never       Allergies:  Patient has no known allergies.    Outpatient Medications:  Prior to Admission medications    Medication Sig Start Date End Date Taking? Authorizing Provider   acyclovir 5% (ZOVIRAX) 5 % ointment Apply topically to the affected area every 4 hours 11/1/24   Emmanuel Freeman MD   aspirin (ECOTRIN) 81 MG EC tablet Take 1 tablet (81 mg total) by mouth once daily for 21 days 10/16/24 11/6/24  Larisa Iyer MD   atorvastatin (LIPITOR) 80 MG tablet Take 1 tablet (80 mg total) by mouth once daily. 10/16/24 10/16/25  Larisa Iyer MD   azelastine (OPTIVAR) 0.05 % ophthalmic solution Place 1 drop into both eyes 2 (two) times daily. 6/19/23 6/18/24  Alexander Adler MD   clobetasoL (TEMOVATE) 0.05 % cream Apply topically as needed. 12/14/23   Provider, Historical   clopidogreL (PLAVIX) 75 mg tablet Take 1 tablet (75 mg total) by mouth once daily. 10/16/24 10/16/25  Larisa Iyer MD   diclofenac sodium 100 mg 24 hr tablet Take 100 mg by mouth every 8 (eight) hours. 11/1/24   Emmanuel Freeman MD  "  fluticasone propionate (FLONASE) 50 mcg/actuation nasal spray 1 spray by Each Nostril route. 11/13/23   Provider, Historical   gabapentin (NEURONTIN) 100 MG capsule Take 100 mg by mouth once daily. 12/14/23   Provider, Historical   ketoconazole (NIZORAL) 2 % cream APPLY CREAM TOPICALLY TO AFFECTED AREA ONCE DAILY BETWEEN THE TOES  Patient not taking: Reported on 10/14/2024    Provider, Historical   levocetirizine (XYZAL) 5 MG tablet Take 1 tablet every day by oral route at bedtime.  Patient not taking: Reported on 10/14/2024    Provider, Historical   losartan (COZAAR) 25 MG tablet Take 25 mg by mouth once daily. 10/6/24   Provider, Historical   mupirocin (BACTROBAN) 2 % ointment APPLY TO NAIL BED TWICE DAILY FOR ONE MONTH REPEAT AS NEEDED 8/11/23   Provider, Historical   neomycin-polymyxin-dexamethasone (DEXACINE) 3.5 mg/g-10,000 unit/g-0.1 % Oint APPLY OINTMENT INTO LEFT EYE AT BEDTIME  Patient not taking: Reported on 10/14/2024 12/10/23   Provider, Historical   nystatin (NYSTOP) powder APPLY A THIN LAYER OF POWDER TOPICALLY TO AFFECTED AREA TWICE DAILY FOR 30 DAYS FOR TINEA PEDIS AS A MAINTENANCE THERAPY.    Provider, Historical   oxybutynin (DITROPAN-XL) 10 MG 24 hr tablet Take 10 mg by mouth once daily. 12/10/23   Provider, Historical   prednisoLONE acetate (PRED FORTE) 1 % DrpS Place 1 drop into both eyes 3 (three) times daily.    Provider, Historical   triamcinolone acetonide 0.1% (KENALOG) 0.1 % cream Apply topically 2 (two) times daily. 6/19/23   Alexander Adler MD       Physical exam:    Vitals: /68 (BP Location: Right arm, Patient Position: Sitting)   Pulse 65   Ht 5' 4" (1.626 m)   Wt 89.8 kg (198 lb)   BMI 33.99 kg/m²     General:   Sitting in chair, in no distress, well-nourished, well-developed, appears stated age.  Head/Neck:   Normocephalic,atraumatic  Pulm:  Non-labored breathing     Mental Status: Alert and oriented to person, time, place, situation. Speech spontaneous and fluent " without paraphasias; no dysarthria  CN:  II: visual fields full  III, IV, VI: EOM intact without nystagmus or diplopia.   V: Sensation to light touch full and symmetric in V1-3. [place] contraction full bilaterally.   VII: Left facial droop  VIII: Hearing grossly normal to conversation.  IX, X: Palate midline with symmetric elevation.    XI: SCM and trapezius: 5/5 bilaterally.   XII: Tongue midline without fasciculations.  Motor: Normal bulk and tone throughout all four extremities.   LUE: D: 5/5; B: 4+/5; T:  4+/5; WF: 5/5; WE:  5/5; IO: 5/5   RUE: D: 5/5; B: 5/5; T:  5/5; WF:5/5; WE:  5/5; IO: 5/5   LLE: HF: 5/5, KE: 5/5, KF: 5/5, DF: 5/5, PF: 5/5  RLE: HF: 5/5, KE: 5/5, KF: 5/5, DF: 5/5, PF: 5/5  No tremors   Sensory: Numbness in left fingers.  Reflexes: LUE: Biceps 3+ brachioradialis 2+  RUE: Biceps 2+, brachioradialis 2+  LLE: Knee 2+, ankle 2+  RLE: Knee 2+, ankle 2+  Coordination:  Intact and symmetric to finger-to-nose  Gait:  Intact to casual gait.    Imaging:  All pertinent imaging was personally reviewed.    On my personal review:   Images were reviewed with the patient.      Results for orders placed during the hospital encounter of 10/14/24    MRI Brain Without Contrast    Narrative  EXAMINATION:  MRI BRAIN WITHOUT CONTRAST    CLINICAL HISTORY:  Stroke, follow up;    TECHNIQUE:  Multiplanar multisequence MR imaging of the brain was performed without contrast.    COMPARISON:  CT scan of the head dated 10/14/2024.    FINDINGS:  The craniocervical junction is intact.  There is empty sella configuration.  There is absence of the normal flow void signal within the right ICA.  This may represent a more proximal occlusion.  The remainder of the intracranial flow voids are within normal limits.    There are multiple foci of diffusion restriction within the right frontal and parietal convexities.  There is also focus of diffusion restriction in the right parietooccipital junction.  There is a focus of diffusion  restriction within the right centrum semiovale.  There is T2/FLAIR signal hyperintensities corresponding to the regions of diffusion restriction.    The ventricles and sulci are prominent, suggestive of mild cerebral volume loss.  There are no extra-axial fluid collections.  There is no evidence of intracranial hemorrhage.    The orbits and intraorbital contents are unremarkable.  The paranasal sinuses are unremarkable.  There are bilateral mastoid effusions.    Impression  Scattered foci of diffusion restriction in the right cerebral hemisphere, suggestive of acute embolic infarctions in the right cerebral hemisphere predominantly within the anterior circulation.  No evidence of hemorrhage.    Absence of the normal flow void within the right ICA.  A more proximal right ICA occlusion is suspected.  CTA of the head and neck is recommended for further evaluation.    Case discussed with Dr. Iyer on 10/14/2024 18:00.      Electronically signed by: Delvin Fallon MD  Date:    10/14/2024  Time:    18:03

## 2025-04-14 ENCOUNTER — OFFICE VISIT (OUTPATIENT)
Dept: NEUROLOGY | Facility: CLINIC | Age: 65
End: 2025-04-14
Payer: MEDICAID

## 2025-04-14 VITALS
SYSTOLIC BLOOD PRESSURE: 107 MMHG | HEART RATE: 65 BPM | BODY MASS INDEX: 33.8 KG/M2 | HEIGHT: 64 IN | WEIGHT: 198 LBS | DIASTOLIC BLOOD PRESSURE: 68 MMHG

## 2025-04-14 DIAGNOSIS — I63.9 CEREBROVASCULAR ACCIDENT (CVA), UNSPECIFIED MECHANISM: Primary | ICD-10-CM

## 2025-04-14 PROCEDURE — 3008F BODY MASS INDEX DOCD: CPT | Mod: CPTII,,, | Performed by: STUDENT IN AN ORGANIZED HEALTH CARE EDUCATION/TRAINING PROGRAM

## 2025-04-14 PROCEDURE — 4010F ACE/ARB THERAPY RXD/TAKEN: CPT | Mod: CPTII,,, | Performed by: STUDENT IN AN ORGANIZED HEALTH CARE EDUCATION/TRAINING PROGRAM

## 2025-04-14 PROCEDURE — 1159F MED LIST DOCD IN RCRD: CPT | Mod: CPTII,,, | Performed by: STUDENT IN AN ORGANIZED HEALTH CARE EDUCATION/TRAINING PROGRAM

## 2025-04-14 PROCEDURE — 3074F SYST BP LT 130 MM HG: CPT | Mod: CPTII,,, | Performed by: STUDENT IN AN ORGANIZED HEALTH CARE EDUCATION/TRAINING PROGRAM

## 2025-04-14 PROCEDURE — 99999 PR PBB SHADOW E&M-EST. PATIENT-LVL IV: CPT | Mod: PBBFAC,,, | Performed by: STUDENT IN AN ORGANIZED HEALTH CARE EDUCATION/TRAINING PROGRAM

## 2025-04-14 PROCEDURE — 99214 OFFICE O/P EST MOD 30 MIN: CPT | Mod: PBBFAC,PN | Performed by: STUDENT IN AN ORGANIZED HEALTH CARE EDUCATION/TRAINING PROGRAM

## 2025-04-14 PROCEDURE — 99212 OFFICE O/P EST SF 10 MIN: CPT | Mod: S$PBB,,, | Performed by: STUDENT IN AN ORGANIZED HEALTH CARE EDUCATION/TRAINING PROGRAM

## 2025-04-14 PROCEDURE — 3078F DIAST BP <80 MM HG: CPT | Mod: CPTII,,, | Performed by: STUDENT IN AN ORGANIZED HEALTH CARE EDUCATION/TRAINING PROGRAM

## 2025-04-14 RX ORDER — ATORVASTATIN CALCIUM 80 MG/1
80 TABLET, FILM COATED ORAL DAILY
Qty: 90 TABLET | Refills: 3 | Status: SHIPPED | OUTPATIENT
Start: 2025-04-14 | End: 2026-04-14

## 2025-04-14 RX ORDER — ASPIRIN 81 MG/1
81 TABLET ORAL DAILY
Qty: 90 TABLET | Refills: 3 | Status: SHIPPED | OUTPATIENT
Start: 2025-04-14 | End: 2026-04-14

## 2025-04-14 RX ORDER — CLOPIDOGREL BISULFATE 75 MG/1
75 TABLET ORAL DAILY
Qty: 90 TABLET | Refills: 3 | Status: SHIPPED | OUTPATIENT
Start: 2025-04-14 | End: 2026-04-14

## 2025-04-24 ENCOUNTER — TELEPHONE (OUTPATIENT)
Dept: ORTHOPEDICS | Facility: CLINIC | Age: 65
End: 2025-04-24
Payer: MEDICAID

## 2025-04-24 NOTE — TELEPHONE ENCOUNTER
----- Message from Nancy sent at 4/24/2025  3:33 PM CDT -----  Caller is requesting to schedule their surgery.Name of Caller:Cruz Call Back Number: [phone] Information:

## 2025-06-26 ENCOUNTER — HOSPITAL ENCOUNTER (EMERGENCY)
Facility: HOSPITAL | Age: 65
Discharge: HOME | End: 2025-06-26
Attending: EMERGENCY MEDICINE
Payer: MEDICAID

## 2025-06-26 VITALS
BODY MASS INDEX: 33.8 KG/M2 | HEART RATE: 68 BPM | HEIGHT: 64 IN | TEMPERATURE: 98 F | SYSTOLIC BLOOD PRESSURE: 156 MMHG | RESPIRATION RATE: 15 BRPM | DIASTOLIC BLOOD PRESSURE: 73 MMHG | WEIGHT: 198 LBS | OXYGEN SATURATION: 97 %

## 2025-06-26 DIAGNOSIS — R07.9 CHEST PAIN: ICD-10-CM

## 2025-06-26 LAB
ABSOLUTE EOSINOPHIL (OHS): 0.39 K/UL
ABSOLUTE MONOCYTE (OHS): 0.72 K/UL (ref 0.3–1)
ABSOLUTE NEUTROPHIL COUNT (OHS): 4.46 K/UL (ref 1.8–7.7)
ALBUMIN SERPL BCP-MCNC: 3.8 G/DL (ref 3.5–5.2)
ALP SERPL-CCNC: 103 UNIT/L (ref 40–150)
ALT SERPL W/O P-5'-P-CCNC: 24 UNIT/L (ref 10–44)
ANION GAP (OHS): 5 MMOL/L (ref 8–16)
AST SERPL-CCNC: 20 UNIT/L (ref 11–45)
BASOPHILS # BLD AUTO: 0.06 K/UL
BASOPHILS NFR BLD AUTO: 0.8 %
BILIRUB SERPL-MCNC: 0.3 MG/DL (ref 0.1–1)
BILIRUB UR QL STRIP.AUTO: NEGATIVE
BNP SERPL-MCNC: 10 PG/ML (ref 0–99)
BUN SERPL-MCNC: 17 MG/DL (ref 8–23)
CALCIUM SERPL-MCNC: 9.4 MG/DL (ref 8.7–10.5)
CHLORIDE SERPL-SCNC: 109 MMOL/L (ref 95–110)
CLARITY UR: CLEAR
CO2 SERPL-SCNC: 25 MMOL/L (ref 23–29)
COLOR UR AUTO: YELLOW
CREAT SERPL-MCNC: 1 MG/DL (ref 0.5–1.4)
ERYTHROCYTE [DISTWIDTH] IN BLOOD BY AUTOMATED COUNT: 14.2 % (ref 11.5–14.5)
GFR SERPLBLD CREATININE-BSD FMLA CKD-EPI: >60 ML/MIN/1.73/M2
GLUCOSE SERPL-MCNC: 100 MG/DL (ref 70–110)
GLUCOSE UR QL STRIP: ABNORMAL
HCT VFR BLD AUTO: 43.1 % (ref 40–54)
HGB BLD-MCNC: 14 GM/DL (ref 14–18)
HGB UR QL STRIP: NEGATIVE
IMM GRANULOCYTES # BLD AUTO: 0.02 K/UL (ref 0–0.04)
IMM GRANULOCYTES NFR BLD AUTO: 0.3 % (ref 0–0.5)
KETONES UR QL STRIP: NEGATIVE
LEUKOCYTE ESTERASE UR QL STRIP: NEGATIVE
LYMPHOCYTES # BLD AUTO: 1.71 K/UL (ref 1–4.8)
MCH RBC QN AUTO: 28.5 PG (ref 27–31)
MCHC RBC AUTO-ENTMCNC: 32.5 G/DL (ref 32–36)
MCV RBC AUTO: 88 FL (ref 82–98)
NITRITE UR QL STRIP: NEGATIVE
NUCLEATED RBC (/100WBC) (OHS): 0 /100 WBC
OHS QRS DURATION: 78 MS
OHS QTC CALCULATION: 394 MS
PH UR STRIP: 5 [PH]
PLATELET # BLD AUTO: 158 K/UL (ref 150–450)
PMV BLD AUTO: 11.7 FL (ref 9.2–12.9)
POTASSIUM SERPL-SCNC: 4 MMOL/L (ref 3.5–5.1)
PROT SERPL-MCNC: 8 GM/DL (ref 6–8.4)
PROT UR QL STRIP: NEGATIVE
RBC # BLD AUTO: 4.91 M/UL (ref 4.6–6.2)
RELATIVE EOSINOPHIL (OHS): 5.3 %
RELATIVE LYMPHOCYTE (OHS): 23.2 % (ref 18–48)
RELATIVE MONOCYTE (OHS): 9.8 % (ref 4–15)
RELATIVE NEUTROPHIL (OHS): 60.6 % (ref 38–73)
SODIUM SERPL-SCNC: 139 MMOL/L (ref 136–145)
SP GR UR STRIP: 1.02
TROPONIN I SERPL DL<=0.01 NG/ML-MCNC: 0.01 NG/ML
TROPONIN I SERPL DL<=0.01 NG/ML-MCNC: <0.006 NG/ML
UROBILINOGEN UR STRIP-ACNC: NEGATIVE EU/DL
WBC # BLD AUTO: 7.36 K/UL (ref 3.9–12.7)

## 2025-06-26 PROCEDURE — 81003 URINALYSIS AUTO W/O SCOPE: CPT | Performed by: NURSE PRACTITIONER

## 2025-06-26 PROCEDURE — 93005 ELECTROCARDIOGRAM TRACING: CPT

## 2025-06-26 PROCEDURE — 82040 ASSAY OF SERUM ALBUMIN: CPT | Performed by: NURSE PRACTITIONER

## 2025-06-26 PROCEDURE — 84484 ASSAY OF TROPONIN QUANT: CPT | Performed by: NURSE PRACTITIONER

## 2025-06-26 PROCEDURE — 85025 COMPLETE CBC W/AUTO DIFF WBC: CPT | Performed by: NURSE PRACTITIONER

## 2025-06-26 PROCEDURE — 99285 EMERGENCY DEPT VISIT HI MDM: CPT | Mod: 25

## 2025-06-26 PROCEDURE — 93010 ELECTROCARDIOGRAM REPORT: CPT | Mod: ,,, | Performed by: INTERNAL MEDICINE

## 2025-06-26 PROCEDURE — 83880 ASSAY OF NATRIURETIC PEPTIDE: CPT | Performed by: NURSE PRACTITIONER

## 2025-06-26 NOTE — FIRST PROVIDER EVALUATION
Emergency Department TeleTriage Encounter Note      CHIEF COMPLAINT    Chief Complaint   Patient presents with    Chest Pain     Intermittent chest pain described as squeezing that worsened yesterday. Not present at this time. States watch alarmed yesterdayStates has chest pain every few hours. States gets short of breath with movement.        VITAL SIGNS   Initial Vitals [06/26/25 1237]   BP Pulse Resp Temp SpO2   (!) 145/69 65 18 98.1 °F (36.7 °C) 98 %      MAP       --            ALLERGIES    Review of patient's allergies indicates:  No Known Allergies    PROVIDER TRIAGE NOTE  Verbal consent for the teletriage evaluation was given by the patient at the start of the evaluation.  All efforts will be made to maintain patient's privacy during the evaluation.      This is a teletriage evaluation of a 64 y.o. male presenting to the ED with c/o CP intermittently for several days that last a few hours; also reports SOB.  PCP sent to the ER. Limited physical exam via telehealth: The patient is awake, alert, answering questions appropriately and is not in respiratory distress.  As the Teletriage provider, I performed an initial assessment and ordered appropriate labs and imaging studies, if any, to facilitate the patient's care once placed in the ED. Once a room is available, care and a full evaluation will be completed by an alternate ED provider.  Any additional orders and the final disposition will be determined by that provider.  All imaging and labs will not be followed-up by the Teletriage Team, including myself.          ORDERS  Labs Reviewed - No data to display    ED Orders (720h ago, onward)      Start Ordered     Status Ordering Provider    06/26/25 1240 06/26/25 1239  EKG 12-lead  Once         Completed by DAMEON BARBA on 6/26/2025 at 12:39 PM BEAU BROWN              Virtual Visit Note: The provider triage portion of this emergency department evaluation and documentation was performed via [employer],  a HIPAA-compliant telemedicine application, in concert with a tele-presenter in the room. A face to face patient evaluation with one of my colleagues will occur once the patient is placed in an emergency department room.      DISCLAIMER: This note was prepared with [employer] video voice recognition transcription software. Garbled syntax, mangled pronouns, and other bizarre constructions may be attributed to that software system.

## 2025-06-26 NOTE — ED TRIAGE NOTES
Pt presents to ED today c/o intermittent Cp x a few mos   Worse last few days   Denies cardiac hx   Pt advised to be seen in ED

## 2025-06-26 NOTE — ED PROVIDER NOTES
Encounter Date: 6/26/2025    History     Chief Complaint   Patient presents with    Chest Pain     Intermittent chest pain described as squeezing that worsened yesterday. Not present at this time. States watch alarmed yesterdayStates has chest pain every few hours. States gets short of breath with movement.         HPI  This is a 64 y.o. male who presents to the Emergency Department with pressure-like chest pain midsternal since yesterday, intermittent, nonradiating, sometimes associated with exertion.  Denies any shortness of breath, nausea, vomiting, diaphoresis, lightheadedness/dizziness, palpitations.  No prior MI, but has recent history of stroke, currently on aspirin and Plavix.    History obtained for alternative sources:  None    Previous or outside records requested and reviewed:  04/14/2025 neurology follow up for CVA    03/25/2025 orthopedic visit for left middle finger trigger finger    03/13/2025 cardiology visit for embolic stroke involving right carotid artery, chest pain, [place]    10/14/2024 echo with EF 55-60%.  Negative bubble study      Review of patient's allergies indicates:  No Known Allergies  Past Medical History:   Diagnosis Date    No known health problems      Past Surgical History:   Procedure Laterality Date    COLONOSCOPY  07/29/2022    COLONOSCOPY N/A 7/29/2022    Procedure: COLONOSCOPY;  Surgeon: Wilbert Jiménez MD;  Location: [facility] ENDO;  Service: Endoscopy;  Laterality: N/A;    CYST REMOVAL N/A 12/22/2023    Procedure: EXCISION, CYST Perineum;  Surgeon: Jaun Clarke MD;  Location: [facility] OR;  Service: General;  Laterality: N/A;    NO PAST SURGERIES      SOFT TISSUE BIOPSY  07/06/2022    SOFT TISSUE BIOPSY N/A 07/06/2022    Procedure: BIOPSY, SOFT TISSUE, left  back, right back, mid back R flank 5cm;  Surgeon: Beny Cabezas MD;  Location: [facility] OR;  Service: General;  Laterality: N/A;     Family History   Problem Relation Name Age of Onset    Hypertension Father dm      Diabetes Mellitus Father      Heart disease Father      Cancer Neg Hx      Hypertension Mother       Social History[1]    Review of Systems  All other systems reviewed and otherwise negative.    Physical Exam     Initial Vitals [06/26/25 1237]   BP Pulse Resp Temp SpO2   (!) 145/69 65 18 98.1 °F (36.7 °C) 98 %      MAP       --         Physical Exam    Nursing note and vitals reviewed.  Constitutional: He appears well-developed and well-nourished. He is not diaphoretic. No distress.   HENT:   Head: Normocephalic and atraumatic. Mouth/Throat: Oropharynx is clear and moist.   Eyes: Conjunctivae are normal.   Cardiovascular:  Normal rate, regular rhythm, normal heart sounds and intact distal pulses.           Pulmonary/Chest: Breath sounds normal. No respiratory distress.   Musculoskeletal:         General: Normal range of motion.     Neurological: He is alert and oriented to person, place, and time. He has normal strength.   Skin: Skin is warm and dry. Capillary refill takes less than 2 seconds. No rash noted. No pallor.   Psychiatric: He has a normal mood and affect.           Medical Decision Making:     Differential Diagnosis:  acute coronary syndrome, pulmonary embolus, esophageal perforation, aortic dissection, pneumonia, and pneumothorax as well as musculoskeletal sources including chest wall strain and costochondritis and GI sources such as esophageal spasm and GERD.      Comorbidities taken into consideration for development of diagnosis and treatment plan include HTN and CVA.      Additional MDM:   Heart Score:    History:          Slightly suspicious.  ECG:             Normal  Age:               45-65 years  Risk factors: >= 3 risk factors or history of atherosclerotic disease  Troponin:       Less than or equal to normal limit  Heart Score = 3            Plan:  Orders Placed This Encounter    X-Ray Chest AP Portable    CBC auto differential    Comprehensive metabolic panel    Troponin I #1    Troponin I #2     BNP    Urinalysis, Reflex to Urine Culture Urine, Clean Catch    CBC with Differential    Ambulatory referral/consult to Cardiology    EKG 12-lead        Social determinants of health taken into consideration during development of our treatment plan include: Limited access to healthcare and Health Literacy      Procedures  Studies:   Labs:  Labs Reviewed   COMPREHENSIVE METABOLIC PANEL - Abnormal       Result Value    Sodium 139      Potassium 4.0      Chloride 109      CO2 25      Glucose 100      BUN 17      Creatinine 1.0      Calcium 9.4      Protein Total 8.0      Albumin 3.8      Bilirubin Total 0.3            AST 20      ALT 24      Anion Gap 5 (*)     eGFR >60     URINALYSIS, REFLEX TO URINE CULTURE - Abnormal    Color, UA Yellow      Appearance, UA Clear      pH, UA 5.0      Spec Grav UA 1.025      Protein, UA Negative      Glucose, UA 2+ (*)     Ketones, UA Negative      Bilirubin, UA Negative      Blood, UA Negative      Nitrites, UA Negative      Urobilinogen, UA Negative      Leukocyte Esterase, UA Negative     TROPONIN I - Normal    Troponin-I 0.007     B-TYPE NATRIURETIC PEPTIDE - Normal    BNP 10     CBC WITH DIFFERENTIAL - Normal    WBC 7.36      RBC 4.91      HGB 14.0      HCT 43.1      MCV 88      MCH 28.5      MCHC 32.5      RDW 14.2      Platelet Count 158      MPV 11.7      Nucleated RBC 0      Neut % 60.6      Lymph % 23.2      Mono % 9.8      Eos % 5.3      Basophil % 0.8      Imm Grans % 0.3      Neut # 4.46      Lymph # 1.71      Mono # 0.72      Eos # 0.39      Baso # 0.06      Imm Grans # 0.02     TROPONIN I - Normal    Troponin-I <0.006     CBC W/ AUTO DIFFERENTIAL    Narrative:     The following orders were created for panel order CBC auto differential.  Procedure                               Abnormality         Status                     ---------                               -----------         ------                     CBC with Differential[[id number]]       Normal               Final result                 Please view results for these tests on the individual orders.       Imaging:     Imaging Results              X-Ray Chest AP Portable (Final result)  Result time 06/26/25 14:56:49      Final result by Demond Miranda MD (06/26/25 14:56:49)                   Impression:      Mildly prominent interstitial markings may be accentuated by AP portable technique, noting that a mild degree of pulmonary vascular congestion would be difficult to exclude in the appropriate clinical context.      Electronically signed by: Demond Miranda  Date:    06/26/2025  Time:    14:56               Narrative:    EXAMINATION:  XR CHEST AP PORTABLE    CLINICAL HISTORY:  Chest Pain;    TECHNIQUE:  Single frontal view of the chest was performed.    COMPARISON:  None    FINDINGS:  Cardiomediastinal contours grossly within normal limits.    Minimally prominent interstitial markings noted bilaterally.    No definite focal airspace consolidation.    No definite pneumothorax or large volume pleural effusion.    No acute findings in the visualized abdomen.    Osseous and soft tissue structures appear without definite acute abnormality.                                         ED Course:     ED Course as of 07/02/25 1224   Thu Jun 26, 2025   1425 Troponin I: 0.007 [NP]   1425 Sodium: 139 [NP]   1425 Potassium: 4.0 [NP]   1425 Chloride: 109 [NP]   1425 CO2: 25 [NP]   1425 Glucose: 100 [NP]   1425 BUN: 17 [NP]   1425 Creatinine: 1.0 [NP]   1425 WBC: 7.36 [NP]   1425 Hemoglobin: 14.0 [NP]   1425 Hematocrit: 43.1 [NP]   1425 Platelet Count: 158 [NP]      ED Course User Index  [NP] Anish Sharma MD               I discussed the case with another provider:  Dr. GUMARO Weller, EM, pending 2nd troponin and disposition.    Patient heart score as above, low risk with atypical symptoms, asymptomatic in the ED.        Clinical Impression:   Final diagnoses:  [R07.9] Chest pain         ED Prescriptions    None       Follow-up  Information       Follow up With Specialties Details Why Contact Info    Emmanuel Freeman MD Internal Medicine Schedule an appointment as soon as possible for a visit   [address]  [address]  [place]  [phone]      Kimberli Coelho MD Interventional Cardiology, Cardiovascular Disease, Cardiology, Internal Medicine Schedule an appointment as soon as possible for a visit   [address]  [address]  [place]  [phone]            Orders Placed This Encounter   Procedures    Ambulatory referral/consult to Cardiology     Standing Status:   Future     Expected Date:   7/3/2025     Expiration Date:   7/26/2026     Referral Priority:   Urgent     Referral Type:   Consultation     Referral Reason:   Specialty Services Required     Requested Specialty:   Cardiology     Number of Visits Requested:   1       DISCLAIMER: This note was prepared with [employer] voice recognition transcription software. Garbled syntax, mangled pronouns, and other bizarre constructions may be attributed to that software system.         [1]   Social History  Tobacco Use    Smoking status: Former     Current packs/day: 0.80     Average packs/day: 0.8 packs/day for 47.5 years (38.0 ttl pk-yrs)     Types: Cigarettes     Start date: 1978    Smokeless tobacco: Never    Tobacco comments:     Enrolled in the [employer] on 7/1/22 ([facility] Member ID # [id number]). Pt is a 0.5 to 0.9 pk/day cigarette smoker x 47 yrs. Pt states ready to quit smoking. Ambulatory referral to Smoking Cessation clinic following hospital discharge.   Vaping Use    Vaping status: Former   Substance Use Topics    Alcohol use: Never    Drug use: Never        Anish Sharma MD  07/02/25 0778

## 2025-07-01 ENCOUNTER — TELEPHONE (OUTPATIENT)
Dept: CARDIOLOGY | Facility: CLINIC | Age: 65
End: 2025-07-01
Payer: MEDICAID

## 2025-07-01 NOTE — TELEPHONE ENCOUNTER
Copied from CRM #[id number]. Topic: Appointments - Amb Referral  >> Jul 1, 2025  2:56 PM Malinda wrote:  Consult/Advisory    Name Of Caller:Jaun      Contact Preference:[phone]     Nature of call: Pt has a referral to see a dr regarding chest pains he was seen in the ER a few days ago no appts are showing avail please call to assist

## 2025-07-01 NOTE — TELEPHONE ENCOUNTER
"Left detailed voice mail for patient.  "I received a message that you need to schedule a follow up appointment with WALDEMAR Garcia after your ER visit for chest pains.  I've scheduled your appointment for 2:00 on 07/08/2025 in [place]".  If this date does not work or you have any questions, my call back is [phone].  "

## 2025-07-08 ENCOUNTER — OFFICE VISIT (OUTPATIENT)
Dept: CARDIOLOGY | Facility: CLINIC | Age: 65
End: 2025-07-08
Payer: MEDICAID

## 2025-07-08 VITALS
BODY MASS INDEX: 35.08 KG/M2 | WEIGHT: 205.5 LBS | SYSTOLIC BLOOD PRESSURE: 118 MMHG | HEIGHT: 64 IN | DIASTOLIC BLOOD PRESSURE: 66 MMHG | OXYGEN SATURATION: 96 % | HEART RATE: 73 BPM

## 2025-07-08 DIAGNOSIS — I10 PRIMARY HYPERTENSION: Primary | ICD-10-CM

## 2025-07-08 DIAGNOSIS — G47.33 OBSTRUCTIVE SLEEP APNEA: ICD-10-CM

## 2025-07-08 DIAGNOSIS — I69.392 FACIAL DROOP AS LATE EFFECT OF CEREBROVASCULAR ACCIDENT (CVA): ICD-10-CM

## 2025-07-08 DIAGNOSIS — I63.131 EMBOLIC STROKE INVOLVING RIGHT CAROTID ARTERY: ICD-10-CM

## 2025-07-08 DIAGNOSIS — R06.02 SOB (SHORTNESS OF BREATH): ICD-10-CM

## 2025-07-08 DIAGNOSIS — Z86.73 HISTORY OF ISCHEMIC MULTIFOCAL MULTIPLE VASCULAR TERRITORIES STROKE: ICD-10-CM

## 2025-07-08 DIAGNOSIS — E78.5 HYPERLIPIDEMIA WITH TARGET LDL LESS THAN 70: ICD-10-CM

## 2025-07-08 DIAGNOSIS — R07.2 PRECORDIAL PAIN: ICD-10-CM

## 2025-07-08 DIAGNOSIS — R07.9 CHEST PAIN, UNSPECIFIED TYPE: ICD-10-CM

## 2025-07-08 PROCEDURE — 99214 OFFICE O/P EST MOD 30 MIN: CPT | Mod: PBBFAC,PN

## 2025-07-08 PROCEDURE — 99999 PR PBB SHADOW E&M-EST. PATIENT-LVL IV: CPT | Mod: PBBFAC,,,

## 2025-07-08 NOTE — PROGRESS NOTES
"[facility] - Cardiology [address]  Cardiology Clinic Note      7/8/2025  3:59 PM    Problem list  Problem List[1]    History of Present Illness    CHIEF COMPLAINT:  Patient presents today for chest pain.    HISTORY OF PRESENT ILLNESS:  He reports daily chest pain occurring for 1-2 minutes, located in the middle of the chest. He describes the sensation as "somebody sitting on" the chest. Symptoms occur daily and resolve spontaneously, with relief from movement and drinking water. Recently, a watch alert prompted an ED visit where multiple tests were performed with no definitive findings. He denies chest pain at time of evaluation.    RESPIRATORY:  He experiences dyspnea with exertion, specifically when climbing stairs. He reports feeling tired and winded, noting his current breathing capacity is diminished compared to baseline. Symptoms limit his physical activity and endurance.    MEDICAL HISTORY:  He has a history of stroke secondary to unilateral carotid stenosis. The unilateral carotid stenosis remains present with only one side of circulation functioning. He denies diabetes.  Former tobacco use.      ROS:  General: -fever, -chills, +fatigue, -weight gain, -weight loss  Eyes: -vision changes, -redness, -discharge  ENT: -ear pain, -nasal congestion, -sore throat  Cardiovascular: +chest pain, -palpitations, -lower extremity edema, +chest pressure, +chest pain at rest  Respiratory: -cough, +shortness of breath, +exertional dyspnea  Gastrointestinal: -abdominal pain, -nausea, -vomiting, -diarrhea, -constipation, -blood in stool  Genitourinary: -dysuria, -hematuria, -frequency  Musculoskeletal: -joint pain, -muscle pain  Skin: -rash, -lesion  Neurological: -headache, -dizziness, -numbness, -tingling  Psychiatric: -anxiety, -depression, -sleep difficulty           Medications  Current Medications[2]   Prior to Admission medications    Medication Sig Start Date End Date Taking? Authorizing Provider   aspirin (ECOTRIN) 81 " MG EC tablet Take 1 tablet (81 mg total) by mouth once daily. 4/14/25 4/14/26 Yes Pietro Johnson MD   atorvastatin (LIPITOR) 80 MG tablet Take 1 tablet (80 mg total) by mouth once daily. 4/14/25 4/14/26 Yes Pietro Johnson MD   clopidogreL (PLAVIX) 75 mg tablet Take 1 tablet (75 mg total) by mouth once daily. 4/14/25 4/14/26 Yes Pietro Johnson MD   gabapentin (NEURONTIN) 100 MG capsule Take 100 mg by mouth once daily. 12/14/23  Yes Provider, Historical   losartan (COZAAR) 25 MG tablet Take 25 mg by mouth once daily. 10/6/24  Yes Provider, Historical   oxybutynin (DITROPAN-XL) 10 MG 24 hr tablet Take 10 mg by mouth once daily. 12/10/23  Yes Provider, Historical   acyclovir 5% (ZOVIRAX) 5 % ointment Apply topically to the affected area every 4 hours 11/1/24   Emmanuel Freeman MD   azelastine (OPTIVAR) 0.05 % ophthalmic solution Place 1 drop into both eyes 2 (two) times daily. 6/19/23 6/18/24  Alexander Adler MD   clobetasoL (TEMOVATE) 0.05 % cream Apply topically as needed. 12/14/23   Provider, Historical   diclofenac sodium 100 mg 24 hr tablet Take 100 mg by mouth every 8 (eight) hours. 11/1/24   Emmanuel Freeman MD   fluticasone propionate (FLONASE) 50 mcg/actuation nasal spray 1 spray by Each Nostril route. 11/13/23   Provider, Historical   ketoconazole (NIZORAL) 2 % cream     Provider, Historical   levocetirizine (XYZAL) 5 MG tablet     Provider, Historical   mupirocin (BACTROBAN) 2 % ointment APPLY TO NAIL BED TWICE DAILY FOR ONE MONTH REPEAT AS NEEDED 8/11/23   Provider, Historical   neomycin-polymyxin-dexamethasone (DEXACINE) 3.5 mg/g-10,000 unit/g-0.1 % Oint  12/10/23   Provider, Historical   nystatin (NYSTOP) powder     Provider, Historical   prednisoLONE acetate (PRED FORTE) 1 % DrpS Place 1 drop into both eyes 3 (three) times daily.    Provider, Historical   triamcinolone acetonide 0.1% (KENALOG) 0.1 % cream Apply topically 2 (two) times daily. 6/19/23   Alexander Adler MD         History  Past  Medical History:   Diagnosis Date    No known health problems      Past Surgical History:   Procedure Laterality Date    COLONOSCOPY  07/29/2022    COLONOSCOPY N/A 7/29/2022    Procedure: COLONOSCOPY;  Surgeon: Wilbert Jiménez MD;  Location: [facility] ENDO;  Service: Endoscopy;  Laterality: N/A;    CYST REMOVAL N/A 12/22/2023    Procedure: EXCISION, CYST Perineum;  Surgeon: Jaun Clarke MD;  Location: [facility] OR;  Service: General;  Laterality: N/A;    NO PAST SURGERIES      SOFT TISSUE BIOPSY  07/06/2022    SOFT TISSUE BIOPSY N/A 07/06/2022    Procedure: BIOPSY, SOFT TISSUE, left  back, right back, mid back R flank 5cm;  Surgeon: Beny Cabezas MD;  Location: [facility] OR;  Service: General;  Laterality: N/A;     Social History[3]      Allergies  Review of patient's allergies indicates:  No Known Allergies      Review of Systems   ROS      Physical Exam  Wt Readings from Last 1 Encounters:   07/08/25 93.2 kg (205 lb 7.5 oz)     BP Readings from Last 3 Encounters:   07/08/25 118/66   06/26/25 (!) 156/73   04/14/25 107/68     Pulse Readings from Last 1 Encounters:   07/08/25 73     Body mass index is 35.27 kg/m².    Physical Exam  HENT:      Head: Normocephalic and atraumatic.      Mouth/Throat:      Mouth: Mucous membranes are moist.   Eyes:      Extraocular Movements: Extraocular movements intact.      Pupils: Pupils are equal, round, and reactive to light.   Neck:      Vascular: No carotid bruit.   Cardiovascular:      Heart sounds: No murmur heard.     No friction rub. No gallop.   Pulmonary:      Effort: Pulmonary effort is normal. No respiratory distress.   Abdominal:      General: Abdomen is flat.      Palpations: Abdomen is soft.   Musculoskeletal:      Right lower leg: No edema.      Left lower leg: No edema.   Skin:     General: Skin is warm.      Capillary Refill: Capillary refill takes less than 2 seconds.   Neurological:      General: No focal deficit present.      Mental Status: He is alert.    Psychiatric:         Mood and Affect: Mood normal.           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    CHEST PAIN, UNSPECIFIED TYPE:  - Go to emergency department if experiencing severe chest pain or feeling of pressure on chest.  - Ordered nuclear stress test to assess heart perfusion and rule out stenosis.  - Ordered echocardiogram.  - Contact the office if chest pain worsens or becomes more frequent.  - Follow up after stress test and echocardiogram results are available.  Currently takes aspirin and Plavix and high-dose statin for previous CVA  Blood pressure well controlled with losartan 25 mg daily    PRIMARY HYPERTENSION:  - Continue taking blood pressure medications as prescribed.  Controlled    PRECORDIAL PAIN:  - Go to emergency department if experiencing severe chest pain or feeling of pressure on chest.  - Ordered nuclear stress test to assess heart perfusion and rule out stenosis.  - Ordered echocardiogram.  - Contact the office if chest pain worsens or becomes more frequent.  - Follow up after stress test and echocardiogram results are available.                [facility]DE[facility] [place] - Cardiology.      Total professional time spent for the encounter: 40 minutes  Time was spent preparing to see the patient, reviewing results of prior testing, obtaining and/or reviewing separately obtained history, performing a medically appropriate examination and interview, counseling and educating the patient/family, ordering medications/tests/procedures, referring and communicating  with other health care professionals, documenting clinical information in the electronic health record, and independently interpreting results.    This note was generated with the assistance of ambient listening technology. Verbal consent was obtained by the patient and accompanying visitor(s) for the recording of patient appointment to facilitate this note. I attest to having reviewed and edited the generated note for accuracy, though some syntax or spelling errors may persist. Please contact the author of this note for any clarification.         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 90 tablet 3    atorvastatin (LIPITOR) 80 MG tablet Take 1 tablet (80 mg total) by mouth once daily. 90 tablet 3    clopidogreL (PLAVIX) 75 mg tablet Take 1 tablet (75 mg total) by mouth once daily. 90 tablet 3    gabapentin (NEURONTIN) 100 MG capsule Take 100 mg by mouth once daily.      losartan (COZAAR) 25 MG tablet Take 25 mg by mouth once daily.      oxybutynin (DITROPAN-XL) 10 MG 24 hr tablet Take 10 mg by mouth once daily.      acyclovir 5% (ZOVIRAX) 5 % ointment Apply topically to the affected area every 4 hours 30 g 0    azelastine (OPTIVAR) 0.05 % ophthalmic solution Place 1 drop into both eyes 2 (two) times daily. 4 mL 11    clobetasoL (TEMOVATE) 0.05 % cream Apply  topically as needed.      diclofenac sodium 100 mg 24 hr tablet Take 100 mg by mouth every 8 (eight) hours. 21 tablet 0    fluticasone propionate (FLONASE) 50 mcg/actuation nasal spray 1 spray by Each Nostril route.      ketoconazole (NIZORAL) 2 % cream       levocetirizine (XYZAL) 5 MG tablet       mupirocin (BACTROBAN) 2 % ointment APPLY TO NAIL BED TWICE DAILY FOR ONE MONTH REPEAT AS NEEDED      neomycin-polymyxin-dexamethasone (DEXACINE) 3.5 mg/g-10,000 unit/g-0.1 % Oint       nystatin (NYSTOP) powder       prednisoLONE acetate (PRED FORTE) 1 % DrpS Place 1 drop into both eyes 3 (three) times daily.      triamcinolone acetonide 0.1% (KENALOG) 0.1 % cream Apply topically 2 (two) times daily. 45 g 2     No current facility-administered medications for this visit.   [3]   Social History  Socioeconomic History    Marital status:    Tobacco Use    Smoking status: Former     Current packs/day: 0.80     Average packs/day: 0.8 packs/day for 47.5 years (38.0 ttl pk-yrs)     Types: Cigarettes     Start date: 1978    Smokeless tobacco: Never    Tobacco comments:     Enrolled in the [employer] on 7/1/22 ([facility] Member ID # [id number]). Pt is a 0.5 to 0.9 pk/day cigarette smoker x 47 yrs. Pt states ready to quit smoking. Ambulatory referral to Smoking Cessation clinic following hospital discharge.   Vaping Use    Vaping status: Former   Substance and Sexual Activity    Alcohol use: Never    Drug use: Never    Sexual activity: Yes     Partners: Female   Social History Narrative    ** Merged History Encounter **         From [place], came to [place]  Tobacco: Initiated at age 22 yo; 0.5 ppd at present   EtOH: None  Drug: None  Employment: ; will retire in 2023  Education: 8th grade   Lives with son, wife        Social Drivers of Health     Financial Resource Strain: Medium Risk (7/1/2025)    Overall Financial Resource Strain (CARDIA)     Difficulty of Paying Living Expenses: Somewhat hard    Food Insecurity: No Food Insecurity (7/1/2025)    Hunger Vital Sign     Worried About Running Out of Food in the Last Year: Never true     Ran Out of Food in the Last Year: Never true   Transportation Needs: No Transportation Needs (7/1/2025)    PRAPARE - Transportation     Lack of Transportation (Medical): No     Lack of Transportation (Non-Medical): No   Physical Activity: Inactive (7/1/2025)    Exercise Vital Sign     Days of Exercise per Week: 0 days     Minutes of Exercise per Session: 0 min   Stress: No Stress Concern Present (7/1/2025)    [employer] - Occupational Stress Questionnaire     Feeling of Stress : Not at all   Housing Stability: Low Risk  (7/1/2025)    Housing Stability Vital Sign     Unable to Pay for Housing in the Last Year: No     Number of Times Moved in the Last Year: 0     Homeless in the Last Year: No

## 2025-07-09 ENCOUNTER — OFFICE VISIT (OUTPATIENT)
Dept: ORTHOPEDICS | Facility: CLINIC | Age: 65
End: 2025-07-09
Payer: MEDICAID

## 2025-07-09 VITALS
DIASTOLIC BLOOD PRESSURE: 65 MMHG | WEIGHT: 205 LBS | BODY MASS INDEX: 35 KG/M2 | HEIGHT: 64 IN | HEART RATE: 70 BPM | SYSTOLIC BLOOD PRESSURE: 93 MMHG

## 2025-07-09 DIAGNOSIS — M65.332 ACQUIRED TRIGGER FINGER OF LEFT MIDDLE FINGER: Primary | ICD-10-CM

## 2025-07-09 DIAGNOSIS — M65.331 ACQUIRED TRIGGER FINGER OF RIGHT MIDDLE FINGER: ICD-10-CM

## 2025-07-09 PROCEDURE — 99999PBSHW PR PBB SHADOW TECHNICAL ONLY FILED TO HB: Mod: PBBFAC,,,

## 2025-07-09 PROCEDURE — 99999 PR PBB SHADOW E&M-EST. PATIENT-LVL IV: CPT | Mod: PBBFAC,,, | Performed by: ORTHOPAEDIC SURGERY

## 2025-07-09 PROCEDURE — 20550 NJX 1 TENDON SHEATH/LIGAMENT: CPT | Mod: PBBFAC,PN,RT | Performed by: ORTHOPAEDIC SURGERY

## 2025-07-09 PROCEDURE — 99214 OFFICE O/P EST MOD 30 MIN: CPT | Mod: PBBFAC,PN | Performed by: ORTHOPAEDIC SURGERY

## 2025-07-09 RX ORDER — TRIAMCINOLONE ACETONIDE 40 MG/ML
40 INJECTION, SUSPENSION INTRA-ARTICULAR; INTRAMUSCULAR
Status: DISCONTINUED | OUTPATIENT
Start: 2025-07-09 | End: 2025-07-09 | Stop reason: HOSPADM

## 2025-07-09 RX ADMIN — TRIAMCINOLONE ACETONIDE 40 MG: 40 INJECTION, SUSPENSION INTRA-ARTICULAR; INTRAMUSCULAR at 10:07

## 2025-07-09 NOTE — PROCEDURES
Tendon Sheath    Date/Time: 7/9/2025 10:15 AM    Performed by: Bc Camarillo MD  Authorized by: Bc Camarillo MD    Indications:  Pain  Local anesthetic:  Lidocaine 1% without epinephrine  Anesthetic total (ml):  1    Location:  Long finger  Site:  R long flexor tendon sheath and L long flexor tendon sheath  Needle size:  25 G  Approach:  Volar  Medications:  40 mg triamcinolone acetonide 40 mg/mL  Patient tolerance:  Patient tolerated the procedure well with no immediate complications

## 2025-07-09 NOTE — PROGRESS NOTES
SUBJECTIVE:      Chief Complaint: bilateral middle trigger finger    History of Present Illness:  Patient is a 64 y.o. left hand dominant male who presents today with complaints of bilateral middle trigger finger. States left more bothersome. No known JASS. Reports he did have CVA about 2 months ago and stroke worsened symptoms. But his symptoms have been present for about 1 year. Also stating left hand fingertips are numb. Stroke did affect left side. Denies surgery or injections to hands. Notes locking to b/l middle fingers. No DM.      The patient is a/an retired.    Onset of symptoms/DOI was 1 year prior.    Symptoms are aggravated by at night.    Symptoms are alleviated by none.    Symptoms consist of locking.    The patient rates their pain as a 4/10.    Attempted treatment(s) and/or interventions include activity modifications, rest.     The patient denies any fevers, chills, N/V, D/C and presents for evaluation.    ____________________________________________________________________    Interval history 7/9/2025 : Patient returns today for follow up of bilateral middle trigger finger.  Previously had injections.  These did not help.  Also recently saw cardiology as we are trying to do a trigger finger release.  They are ordering a stress test and possible angiogram if abnormal.         Past Medical History:   Diagnosis Date    No known health problems      Past Surgical History:   Procedure Laterality Date    COLONOSCOPY  07/29/2022    COLONOSCOPY N/A 7/29/2022    Procedure: COLONOSCOPY;  Surgeon: Wilbert Jiménez MD;  Location: [facility];  Service: Endoscopy;  Laterality: N/A;    CYST REMOVAL N/A 12/22/2023    Procedure: EXCISION, CYST Perineum;  Surgeon: Jaun Clarke MD;  Location: [facility] OR;  Service: General;  Laterality: N/A;    NO PAST SURGERIES      SOFT TISSUE BIOPSY  07/06/2022    SOFT TISSUE BIOPSY N/A 07/06/2022    Procedure: BIOPSY, SOFT TISSUE, left  back, right back, mid back R flank 5cm;   Surgeon: Beny Cabezas MD;  Location: [facility];  Service: General;  Laterality: N/A;     Review of patient's allergies indicates:  No Known Allergies  Social History     Social History Narrative    ** Merged History Encounter **         From [place], came to [place] ago  Tobacco: Initiated at age 22 yo; 0.5 ppd at present   EtOH: None  Drug: None  Employment: ; will retire in 2023  Education: 8th grade   Lives with son, wife        Family History   Problem Relation Name Age of Onset    Hypertension Father dm     Diabetes Mellitus Father      Heart disease Father      Cancer Neg Hx      Hypertension Mother           Current Outpatient Medications:     acyclovir 5% (ZOVIRAX) 5 % ointment, Apply topically to the affected area every 4 hours, Disp: 30 g, Rfl: 0    aspirin (ECOTRIN) 81 MG EC tablet, Take 1 tablet (81 mg total) by mouth once daily., Disp: 90 tablet, Rfl: 3    atorvastatin (LIPITOR) 80 MG tablet, Take 1 tablet (80 mg total) by mouth once daily., Disp: 90 tablet, Rfl: 3    clobetasoL (TEMOVATE) 0.05 % cream, Apply topically as needed., Disp: , Rfl:     clopidogreL (PLAVIX) 75 mg tablet, Take 1 tablet (75 mg total) by mouth once daily., Disp: 90 tablet, Rfl: 3    diclofenac sodium 100 mg 24 hr tablet, Take 100 mg by mouth every 8 (eight) hours., Disp: 21 tablet, Rfl: 0    fluticasone propionate (FLONASE) 50 mcg/actuation nasal spray, 1 spray by Each Nostril route., Disp: , Rfl:     gabapentin (NEURONTIN) 100 MG capsule, Take 100 mg by mouth once daily., Disp: , Rfl:     ketoconazole (NIZORAL) 2 % cream, , Disp: , Rfl:     levocetirizine (XYZAL) 5 MG tablet, , Disp: , Rfl:     losartan (COZAAR) 25 MG tablet, Take 25 mg by mouth once daily., Disp: , Rfl:     mupirocin (BACTROBAN) 2 % ointment, APPLY TO NAIL BED TWICE DAILY FOR ONE MONTH REPEAT AS NEEDED, Disp: , Rfl:     neomycin-polymyxin-dexamethasone (DEXACINE) 3.5 mg/g-10,000 unit/g-0.1 % Oint, , Disp: , Rfl:     nystatin  "(NYSTOP) powder, , Disp: , Rfl:     oxybutynin (DITROPAN-XL) 10 MG 24 hr tablet, Take 10 mg by mouth once daily., Disp: , Rfl:     prednisoLONE acetate (PRED FORTE) 1 % DrpS, Place 1 drop into both eyes 3 (three) times daily., Disp: , Rfl:     triamcinolone acetonide 0.1% (KENALOG) 0.1 % cream, Apply topically 2 (two) times daily., Disp: 45 g, Rfl: 2    azelastine (OPTIVAR) 0.05 % ophthalmic solution, Place 1 drop into both eyes 2 (two) times daily., Disp: 4 mL, Rfl: 11      Review of Systems:  Constitutional: no fever or chills  Eyes: no visual changes  ENT: no nasal congestion or sore throat  Respiratory: no cough or shortness of breath  Cardiovascular: no chest pain  Gastrointestinal: no nausea or vomiting, tolerating diet  Musculoskeletal: pain    OBJECTIVE:      Vital Signs (Most Recent):  Vitals:    07/09/25 1001   BP: 93/65   Pulse: 70   Weight: 93 kg (205 lb 0.4 oz)   Height: 5' 4" (1.626 m)     Body mass index is 35.19 kg/m².      Physical Exam:  Constitutional: The patient appears well-developed and well-nourished. No distress.   Skin: No lesions appreciated  Head: Normocephalic and atraumatic.   Nose: Nose normal.   Ears: No deformities seen  Eyes: Conjunctivae and EOM are normal.   Neck: No tracheal deviation present.   Cardiovascular: Normal rate and intact distal pulses.    Pulmonary/Chest: Effort normal. No respiratory distress.   Abdominal: There is no guarding.   Neurological: The patient is alert.   Psychiatric: The patient has a normal mood and affect.     Bilateral Hand/Wrist Examination:    Observation/Inspection:  Swelling  none    Deformity  none  Discoloration  none     Scars   none    Atrophy  none    HAND/WRIST EXAMINATION:  Finkelstein's Test   Neg  WHAT Test    Neg  Snuff box tenderness   Neg  Nina's Test    Neg  Hook of Hamate Tenderness  Neg  CMC grind    Neg  Circumduction test   Neg  TTP     Bilateral A1 pulleys     Neurovascular Exam:  Digits WWP, brisk CR < 3s throughout  NVI " motor/LTS to M/R/U nerves, radial pulse 2+  Tinel's Test - Carpal Tunnel  Neg  Tinel's Test - Cubital Tunnel  Neg  Phalen's Test    Neg  Median Nerve Compression Test Neg  AIN Intact (O-Amanda), PIN Intact (Thumbs Up), Ulnar Intact (scissors)    ROM hand full, painless, trigger noted to both middle fingers in clinic    ROM wrist full, painless    ROM elbow full, painless    Abdomen not guarded  Respirations nonlabored  Perfusion intact    Diagnostic Results:     Imaging - I independently viewed the patient's imaging as well as the radiology report.  Xrays of the patient's b/l hands  demonstrate no evidence of any obvious acute fractures or dislocations or significant degenerative changes.    ASSESSMENT/PLAN:      1. Acquired trigger finger of left middle finger        2. Acquired trigger finger of right middle finger            Patient here for follow up of bilateral middle trigger finger.  He has failed a injection to the bilateral hands in the past.  He is not a surgical candidate this time pending cardiac clearance.  Plan will be for repeat bilateral trigger finger injections today.  Can proceed with surgery once medically cleared if the injections do not help

## 2025-07-15 ENCOUNTER — PATIENT MESSAGE (OUTPATIENT)
Dept: CARDIOLOGY | Facility: CLINIC | Age: 65
End: 2025-07-15
Payer: MEDICAID

## 2025-07-17 ENCOUNTER — OFFICE VISIT (OUTPATIENT)
Dept: PODIATRY | Facility: CLINIC | Age: 65
End: 2025-07-17
Payer: MEDICAID

## 2025-07-17 VITALS
DIASTOLIC BLOOD PRESSURE: 79 MMHG | WEIGHT: 203.63 LBS | HEART RATE: 80 BPM | BODY MASS INDEX: 34.76 KG/M2 | HEIGHT: 64 IN | SYSTOLIC BLOOD PRESSURE: 130 MMHG

## 2025-07-17 DIAGNOSIS — S91.209A AVULSION OF TOENAIL, INITIAL ENCOUNTER: ICD-10-CM

## 2025-07-17 DIAGNOSIS — I63.131 EMBOLIC STROKE INVOLVING RIGHT CAROTID ARTERY: ICD-10-CM

## 2025-07-17 DIAGNOSIS — Z79.01 ANTICOAGULATED: ICD-10-CM

## 2025-07-17 DIAGNOSIS — D36.13 NEUROMA OF FOOT: ICD-10-CM

## 2025-07-17 DIAGNOSIS — B35.3 TINEA PEDIS, UNSPECIFIED LATERALITY: Primary | ICD-10-CM

## 2025-07-17 PROCEDURE — 99215 OFFICE O/P EST HI 40 MIN: CPT | Mod: PBBFAC,PN | Performed by: PODIATRIST

## 2025-07-17 PROCEDURE — 99999 PR PBB SHADOW E&M-EST. PATIENT-LVL V: CPT | Mod: PBBFAC,,, | Performed by: PODIATRIST

## 2025-07-17 RX ORDER — LORATADINE 10 MG/1
10 TABLET ORAL DAILY PRN
COMMUNITY
Start: 2025-02-04

## 2025-07-17 RX ORDER — GENTIAN VIOLET 1% 10 MG/ML
0.5 LIQUID TOPICAL DAILY PRN
Status: SHIPPED | OUTPATIENT
Start: 2025-07-17

## 2025-07-17 RX ORDER — CROMOLYN SODIUM 40 MG/ML
SOLUTION/ DROPS OPHTHALMIC
COMMUNITY
Start: 2025-02-20

## 2025-07-17 RX ORDER — CLOTRIMAZOLE AND BETAMETHASONE DIPROPIONATE 10; .64 MG/G; MG/G
CREAM TOPICAL 2 TIMES DAILY
Qty: 45 G | Refills: 2 | Status: SHIPPED | OUTPATIENT
Start: 2025-07-17

## 2025-07-17 RX ORDER — MELOXICAM 15 MG/1
15 TABLET ORAL DAILY
COMMUNITY
Start: 2025-06-06

## 2025-07-17 RX ORDER — MONTELUKAST SODIUM 10 MG/1
10 TABLET ORAL
COMMUNITY
Start: 2025-06-21

## 2025-07-17 RX ORDER — EZETIMIBE 10 MG/1
10 TABLET ORAL NIGHTLY
COMMUNITY
Start: 2025-07-11

## 2025-07-17 NOTE — PROGRESS NOTES
Subjective:      Patient ID: Jaun Estevez is a 64 y.o. male.    Chief Complaint: nail avulsion (Right great toenail. ) and Eczema (feet)    Patient was added onto the schedule today.  Concerned about great toe R ; fell about 2 months ago. Went to the ED & had remaining avulsed. Xray negative. No current pain.   Also dry skin medial heels.  Rarely, has forefoot discomfort R>L & itching between toes.    PCP Emmanuel Freeman MD    States on Plavix & aspirin for cholesterol; takes gabapentin for neck pain.  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   Respiratory:  Positive for sleep disturbances due to breathing and snoring.    Skin:  Positive for dry skin, itching, nail changes and rash. Negative for color change and suspicious lesions.   Musculoskeletal:  Positive for falls and joint pain.   Neurological:  Negative for focal weakness.   Psychiatric/Behavioral:  The patient is not nervous/anxious.      Physical Exam  Vitals reviewed.   Constitutional:       General: He is not in acute distress.     Appearance: He is well-developed. He is obese.   Cardiovascular:       Pulses: Normal pulses.           Dorsalis pedis pulses are 2+ on the right side and 2+ on the left side.   Musculoskeletal:         General: No swelling or tenderness.      Right lower leg: No edema.      Left lower leg: No edema.   Feet:      Right foot:      Skin integrity: Callus and dry skin present. No fissure.      Toenail Condition: Fungal disease present.     Left foot:      Skin integrity: Callus and dry skin present. No fissure.      Comments: Toenail R 1st w/ some regrowth & thickened, dystrophic appearance; no tenderness to distal pressure nor periungual skin abnormality noted.  Skin:     General: Skin is warm and dry.      Capillary Refill: Capillary refill takes less than 2 seconds.      Findings: Rash present. No bruising, erythema or lesion. Rash is scaling.      Comments: Slight maceration & peeling skin especially between toes 2nd & 3rd webspace R>L, w/ pruritus; no erythema.   Neurological:      Mental Status: He is alert and oriented to person, place, and time.      Sensory: Sensation is intact. No sensory deficit.      Motor: Motor function is intact. No weakness.      Gait: Gait is intact. Gait normal.   Psychiatric:         Mood and Affect: Mood normal.         Behavior: Behavior normal. Behavior is cooperative.     Me to make you a lambda this for tomorrow  Assessment:      Encounter Diagnoses   Name Primary?    Avulsion of toenail, initial encounter     Tinea pedis, unspecified laterality Yes    Embolic stroke involving right carotid artery     Neuroma of foot        Problem List Items Addressed This Visit          Neuro    Embolic stroke involving right carotid artery       Orthopedic    Toenail avulsion     Other Visit Diagnoses         Tinea pedis, unspecified laterality    -  Primary    Relevant Medications    clotrimazole-betamethasone 1-0.05% (LOTRISONE) cream      Neuroma of foot          Anticoagulated              Plan:       Jaun was seen today for nail avulsion and  eczema.    Diagnoses and all orders for this visit:    Tinea pedis, unspecified laterality  -     clotrimazole-betamethasone 1-0.05% (LOTRISONE) cream; Apply topically 2 (two) times daily.    Avulsion of toenail, initial encounter  -     Ambulatory referral/consult to Podiatry    Embolic stroke involving right carotid artery    Neuroma of foot    Other orders  -     gentian violet 1 % topical solution 0.5 mL    I counseled the patient on his conditions, their implications & medical mgmt.    -Discussed shoe choice.   The importance of wearing shoes w/ adequate room in toe box to accommodate deformities were discussed.   Recommended shoes w/ adequate arch supports to alleviate abnormal pressure & improve stability of foot while walking.   Avoid flat shoes or barefoot walking as these will exacerbate or worsen symptoms.    Patient instructed on proper foot hygeine.   Gentian violet applied between toes.  Patient was advised on antifungal tx options.  Rx Lotrisone written.        A total of 25 mins.was spent on chart review, patient visit & documentation.

## 2025-07-18 ENCOUNTER — TELEPHONE (OUTPATIENT)
Dept: PODIATRY | Facility: CLINIC | Age: 65
End: 2025-07-18
Payer: MEDICAID

## 2025-07-18 NOTE — TELEPHONE ENCOUNTER
Left message returning his call. Medication was sent to [facility] in [place]. Please contact pharmacy.

## 2025-07-25 ENCOUNTER — PATIENT MESSAGE (OUTPATIENT)
Dept: ORTHOPEDICS | Facility: CLINIC | Age: 65
End: 2025-07-25
Payer: MEDICAID

## 2025-07-29 NOTE — PATIENT INSTRUCTIONS
Pick one & use for 2-4 weeks (for your skin):    1. Listerine mouthwash (yellow/gold) - soak for 5-10minutes daily (may re-use solution up to a week)    2. Vicks Vaporub to nails daily after a bath/end of day/bedtime.    3. Lamisil (terbanafine) 1% antifungal cream daily to nails after shower.

## 2025-08-25 PROBLEM — R06.02 SOB (SHORTNESS OF BREATH): Status: RESOLVED | Noted: 2025-07-08 | Resolved: 2025-08-25

## 2025-08-25 PROBLEM — R07.9 CHEST PAIN: Status: RESOLVED | Noted: 2025-07-08 | Resolved: 2025-08-25

## 2025-08-27 ENCOUNTER — OFFICE VISIT (OUTPATIENT)
Dept: NEUROLOGY | Facility: CLINIC | Age: 65
End: 2025-08-27
Payer: MEDICAID

## 2025-08-27 VITALS
SYSTOLIC BLOOD PRESSURE: 124 MMHG | WEIGHT: 203.69 LBS | BODY MASS INDEX: 34.77 KG/M2 | DIASTOLIC BLOOD PRESSURE: 78 MMHG | HEART RATE: 75 BPM | HEIGHT: 64 IN

## 2025-08-27 DIAGNOSIS — E78.5 HYPERLIPIDEMIA WITH TARGET LDL LESS THAN 70: ICD-10-CM

## 2025-08-27 DIAGNOSIS — I63.231 STROKE DUE TO OCCLUSION OF RIGHT CAROTID ARTERY: Primary | ICD-10-CM

## 2025-08-27 DIAGNOSIS — G47.33 OBSTRUCTIVE SLEEP APNEA: ICD-10-CM

## 2025-08-27 DIAGNOSIS — Z72.0 TOBACCO USE: ICD-10-CM

## 2025-08-27 PROCEDURE — 99213 OFFICE O/P EST LOW 20 MIN: CPT | Mod: PBBFAC | Performed by: PHYSICIAN ASSISTANT

## 2025-08-27 PROCEDURE — 99999 PR PBB SHADOW E&M-EST. PATIENT-LVL III: CPT | Mod: PBBFAC,,, | Performed by: PHYSICIAN ASSISTANT

## 2025-08-27 RX ORDER — ASPIRIN 325 MG
325 TABLET, DELAYED RELEASE (ENTERIC COATED) ORAL DAILY
Qty: 30 TABLET | Refills: 11 | Status: SHIPPED | OUTPATIENT
Start: 2025-08-27 | End: 2026-08-27

## 2025-08-29 ENCOUNTER — PATIENT MESSAGE (OUTPATIENT)
Dept: NEUROLOGY | Facility: CLINIC | Age: 65
End: 2025-08-29
Payer: MEDICAID

## (undated) DEVICE — PACK BASIC

## (undated) DEVICE — DRESSING XEROFORM 1X8IN

## (undated) DEVICE — GOWN POLY REINF BRTH SLV LG

## (undated) DEVICE — DRAPE LAP T SHT W/ INSTR PAD

## (undated) DEVICE — APPLICATOR CHLORAPREP ORN 26ML

## (undated) DEVICE — COVER OVERHEAD SURG LT BLUE

## (undated) DEVICE — PACK SURGERY START

## (undated) DEVICE — SUT VICRYL 3-0 27 SH

## (undated) DEVICE — SUT 4-0 ETHILON 18 PS-2

## (undated) DEVICE — GAUZE SPONGE 4X4 12PLY

## (undated) DEVICE — GLOVE BIOGEL ECLIPSE SZ 7.5

## (undated) DEVICE — PAD PREP CUFFED NS 24X48IN

## (undated) DEVICE — GOWN POLY REINF BRTH SLV XL

## (undated) DEVICE — SYR B-D DISP CONTROL 10CC100/C

## (undated) DEVICE — NDL 22GA X1 1/2 REG BEVEL

## (undated) DEVICE — TOWEL OR DISP STRL BLUE 4/PK